# Patient Record
Sex: FEMALE | Race: WHITE | NOT HISPANIC OR LATINO | Employment: FULL TIME | URBAN - METROPOLITAN AREA
[De-identification: names, ages, dates, MRNs, and addresses within clinical notes are randomized per-mention and may not be internally consistent; named-entity substitution may affect disease eponyms.]

---

## 2017-01-09 ENCOUNTER — OFFICE VISIT (OUTPATIENT)
Dept: INTERNAL MEDICINE | Facility: CLINIC | Age: 42
End: 2017-01-09
Payer: OTHER GOVERNMENT

## 2017-01-09 VITALS
HEART RATE: 93 BPM | DIASTOLIC BLOOD PRESSURE: 80 MMHG | WEIGHT: 220 LBS | TEMPERATURE: 99 F | BODY MASS INDEX: 37.56 KG/M2 | HEIGHT: 64 IN | SYSTOLIC BLOOD PRESSURE: 124 MMHG | OXYGEN SATURATION: 97 %

## 2017-01-09 DIAGNOSIS — L98.9 SKIN LESION: ICD-10-CM

## 2017-01-09 DIAGNOSIS — J32.9 SINUSITIS, UNSPECIFIED CHRONICITY, UNSPECIFIED LOCATION: Primary | ICD-10-CM

## 2017-01-09 DIAGNOSIS — E89.0 POST-SURGICAL HYPOTHYROIDISM: ICD-10-CM

## 2017-01-09 PROCEDURE — 99999 PR PBB SHADOW E&M-EST. PATIENT-LVL III: CPT | Mod: PBBFAC,,, | Performed by: FAMILY MEDICINE

## 2017-01-09 PROCEDURE — 99213 OFFICE O/P EST LOW 20 MIN: CPT | Mod: PBBFAC | Performed by: FAMILY MEDICINE

## 2017-01-09 PROCEDURE — 99214 OFFICE O/P EST MOD 30 MIN: CPT | Mod: S$PBB,,, | Performed by: FAMILY MEDICINE

## 2017-01-09 RX ORDER — BENZONATATE 200 MG/1
200 CAPSULE ORAL 3 TIMES DAILY PRN
Qty: 30 CAPSULE | Refills: 1 | Status: SHIPPED | OUTPATIENT
Start: 2017-01-09 | End: 2018-02-07

## 2017-01-09 RX ORDER — FLUTICASONE PROPIONATE 50 MCG
2 SPRAY, SUSPENSION (ML) NASAL DAILY
Qty: 1 BOTTLE | Refills: 5 | Status: SHIPPED | OUTPATIENT
Start: 2017-01-09 | End: 2018-02-07

## 2017-01-09 RX ORDER — ALBUTEROL SULFATE 90 UG/1
2 AEROSOL, METERED RESPIRATORY (INHALATION) EVERY 6 HOURS PRN
Qty: 18 G | Refills: 5 | Status: SHIPPED | OUTPATIENT
Start: 2017-01-09 | End: 2018-12-31

## 2017-01-09 RX ORDER — DOXYCYCLINE HYCLATE 100 MG
100 TABLET ORAL 2 TIMES DAILY
Qty: 14 TABLET | Refills: 0 | Status: SHIPPED | OUTPATIENT
Start: 2017-01-09 | End: 2017-01-16

## 2017-01-09 NOTE — PROGRESS NOTES
Subjective:       Patient ID: Kellen Fraser is a 41 y.o. female.    Chief Complaint: URI    HPI Comments: Patient complains of upper respiratory congestion, rhinorrhea and facial pressure/pain that is not improving. No fever, chills or significant dyspnea or sputum production. OTC meds not effective. Some fatigue and malaise noted. 5 days. Tooth pain.    URI    Associated symptoms include congestion, coughing and wheezing. Pertinent negatives include no abdominal pain, chest pain, headaches, neck pain or rash.     Review of Systems   Constitutional: Negative for chills, fatigue and fever.   HENT: Positive for congestion, postnasal drip and sinus pressure. Negative for trouble swallowing.    Eyes: Negative for redness.   Respiratory: Positive for cough and wheezing. Negative for chest tightness and shortness of breath.    Cardiovascular: Negative for chest pain, palpitations and leg swelling.   Gastrointestinal: Negative for abdominal pain and blood in stool.   Genitourinary: Negative for hematuria and menstrual problem.   Musculoskeletal: Negative for arthralgias, back pain, gait problem, joint swelling, myalgias and neck pain.   Skin: Negative for color change and rash.   Neurological: Negative for tremors, speech difficulty, weakness, numbness and headaches.   Hematological: Negative for adenopathy. Does not bruise/bleed easily.   Psychiatric/Behavioral: Negative for behavioral problems, confusion and sleep disturbance. The patient is not nervous/anxious.        Objective:      Physical Exam   Constitutional: She is oriented to person, place, and time. She appears well-developed and well-nourished. No distress.   HENT:   Head: Normocephalic.   Right Ear: Tympanic membrane, external ear and ear canal normal.   Left Ear: Tympanic membrane, external ear and ear canal normal.   Nose: Nose normal.   Mouth/Throat: Oropharynx is clear and moist. No oropharyngeal exudate.   Eyes: Conjunctivae are normal. Right eye  exhibits no discharge. Left eye exhibits no discharge. No scleral icterus.   Neck: Normal range of motion. Neck supple. No thyromegaly present.   Cardiovascular: Normal rate, regular rhythm, normal heart sounds and intact distal pulses.  Exam reveals no gallop and no friction rub.    No murmur heard.  Pulmonary/Chest: Effort normal and breath sounds normal. No respiratory distress. She has no wheezes. She has no rales. She exhibits no tenderness.   Abdominal: Soft. There is no tenderness.   Musculoskeletal: She exhibits no edema.   Lymphadenopathy:     She has no cervical adenopathy.   Neurological: She is alert and oriented to person, place, and time.   Skin: Skin is warm and dry. No rash noted. She is not diaphoretic.   Nursing note and vitals reviewed.      Assessment:       1. Sinusitis, unspecified chronicity, unspecified location    2. Post-surgical hypothyroidism    3. Skin lesion        Plan:   Kellen was seen today for uri.    Diagnoses and all orders for this visit:    Sinusitis, unspecified chronicity, unspecified location    Post-surgical hypothyroidism    Skin lesion  -     Ambulatory referral to Dermatology    Other orders  -     doxycycline (VIBRA-TABS) 100 MG tablet; Take 1 tablet (100 mg total) by mouth 2 (two) times daily.  -     fluticasone (FLONASE) 50 mcg/actuation nasal spray; 2 sprays by Each Nare route once daily.  -     benzonatate (TESSALON) 200 MG capsule; Take 1 capsule (200 mg total) by mouth 3 (three) times daily as needed for Cough.  -     albuterol 90 mcg/actuation inhaler; Inhale 2 puffs into the lungs every 6 (six) hours as needed.      See meds, orders, follow up, routing and instructions sections of encounter.  A 41-year-old, upper respiratory complaints; facial pain five days, worsening;   mole to back, a few days, irritated; feverishness and chills, myalgias.    RECOMMENDATIONS:  She does have a 2-part small mole, which does not appear   infected to the mid back, mild facial  percussion tenderness.  See meds and   orders.  Follow up p.r.n.  Dermatology consult.      RICHARD/JUAN  dd: 01/09/2017 15:50:58 (CST)  td: 01/10/2017 05:55:13 (CST)  Doc ID   #[id number]  Job ID #[id number]    CC:

## 2017-01-09 NOTE — MR AVS SNAPSHOT
[facility]  [address]  [place] [address]  Phone: [phone]  Fax: [phone]                  Kellen Fraser   2017 2:40 PM   Office Visit    Description:  Female : 1975   Provider:  Juan Luis Alvarez MD   Department:  [facility]           Reason for Visit     URI           Diagnoses this Visit        Comments    Sinusitis, unspecified chronicity, unspecified location    -  Primary     Post-surgical hypothyroidism         Skin lesion                To Do List           Goals (5 Years of Data)     None      Follow-Up and Disposition     Return if symptoms worsen or fail to improve, for Keep appointments with specialty providers..    Follow-up and Disposition History       These Medications        Disp Refills Start End    doxycycline (VIBRA-TABS) 100 MG tablet 14 tablet 0 2017    Take 1 tablet (100 mg total) by mouth 2 (two) times daily. - Oral    Pharmacy: [id number] [place] - [id number] Ph #: [phone]       fluticasone (FLONASE) 50 mcg/actuation nasal spray 1 Bottle 5 2017     2 sprays by Each Nare route once daily. - Each Nare    Pharmacy: [id number] - [place] - [id number] Ph #: [phone]       benzonatate (TESSALON) 200 MG capsule 30 capsule 1 2017     Take 1 capsule (200 mg total) by mouth 3 (three) times daily as needed for Cough. - Oral    Ph[facility]: [id number] [place] [id number] Ph #: [phone]       albuterol 90 mcg/actuation inhaler 18 g 5 2017    Inhale 2 puffs into the lungs every 6 (six) hours as needed. - Inhalation    Ph[facility]acy: [id number] [place] - [id number] Ph #: [phone]         [facility]     [facility] On Call Nurse Care Line -  Assistance  Registered nurses in the [facility] On Call Center provide clinical advisement, health education, appointment booking, and other advisory services.  Call for this free service at  [phone].             Medications           START taking these NEW medications        Refills    doxycycline (VIBRA-TABS) 100 MG tablet 0    Sig: Take 1 tablet (100 mg total) by mouth 2 (two) times daily.    Class: Normal    Route: Oral    fluticasone (FLONASE) 50 mcg/actuation nasal spray 5    Si sprays by Each Nare route once daily.    Class: Normal    Route: Each Nare    benzonatate (TESSALON) 200 MG capsule 1    Sig: Take 1 capsule (200 mg total) by mouth 3 (three) times daily as needed for Cough.    Class: Normal    Route: Oral      CHANGE how you are taking these medications     Start Taking Instead of    albuterol 90 mcg/actuation inhaler ALBUTEROL SULFATE INHL    Dosage:  Inhale 2 puffs into the lungs every 6 (six) hours as needed. Dosage:  Inhale into the lungs as needed.    Reason for Change:  Reorder       STOP taking these medications     methylPREDNISolone (MEDROL DOSEPACK) 4 mg tablet Take 1 tablet (4 mg total) by mouth once daily. Use as instructed on dose pack           Verify that the below list of medications is an accurate representation of the medications you are currently taking.  If none reported, the list may be blank. If incorrect, please contact your healthcare provider. Carry this list with you in case of emergency.           Current Medications     albuterol 90 mcg/actuation inhaler Inhale 2 puffs into the lungs every 6 (six) hours as needed.    ibuprofen (ADVIL,MOTRIN) 800 MG tablet Take 800 mg by mouth 3 (three) times daily.    levothyroxine (SYNTHROID, LEVOTHROID) 175 MCG tablet Take 1 tablet (175 mcg total) by mouth once daily.    benzonatate (TESSALON) 200 MG capsule Take 1 capsule (200 mg total) by mouth 3 (three) times daily as needed for Cough.    doxycycline (VIBRA-TABS) 100 MG tablet Take 1 tablet (100 mg total) by mouth 2 (two) times daily.    fluticasone (FLONASE) 50 mcg/actuation nasal spray 2 sprays by Each Nare route once daily.           Clinical Reference  "Information           Vital Signs - Last Recorded  Most recent update: 1/9/2017  2:46 PM by Maria Isabel Moreno MA    BP Pulse Temp Ht Wt SpO2    124/80 (BP Location: Right arm, Patient Position: Sitting, BP Method: Manual) 93 99.2 °F (37.3 °C) (Oral) 5' 4" (1.626 m) 99.8 kg (220 lb 0.3 oz) 97%    BMI                37.77 kg/m2          Blood Pressure          Most Recent Value    BP  124/80      Allergies as of 1/9/2017     Bactrim [Sulfamethoxazole-trimethoprim]    Demerol [Meperidine]    Dilaudid [Hydromorphone (Bulk)]    Erythromycin    Penicillins      Immunizations Administered on Date of Encounter - 1/9/2017     None      Orders Placed During Today's Visit      Normal Orders This Visit    Ambulatory referral to Dermatology       "

## 2017-02-14 ENCOUNTER — INITIAL CONSULT (OUTPATIENT)
Dept: DERMATOLOGY | Facility: CLINIC | Age: 42
End: 2017-02-14
Payer: OTHER GOVERNMENT

## 2017-02-14 DIAGNOSIS — D48.5 NEOPLASM OF UNCERTAIN BEHAVIOR OF SKIN: Primary | ICD-10-CM

## 2017-02-14 DIAGNOSIS — L81.4 LENTIGINES: ICD-10-CM

## 2017-02-14 PROCEDURE — 99213 OFFICE O/P EST LOW 20 MIN: CPT | Mod: PBBFAC | Performed by: DERMATOLOGY

## 2017-02-14 PROCEDURE — 88305 TISSUE EXAM BY PATHOLOGIST: CPT | Performed by: PATHOLOGY

## 2017-02-14 PROCEDURE — 99202 OFFICE O/P NEW SF 15 MIN: CPT | Mod: 25,S$PBB,, | Performed by: DERMATOLOGY

## 2017-02-14 PROCEDURE — 99999 PR PBB SHADOW E&M-EST. PATIENT-LVL III: CPT | Mod: PBBFAC,,, | Performed by: DERMATOLOGY

## 2017-02-14 PROCEDURE — 88342 IMHCHEM/IMCYTCHM 1ST ANTB: CPT | Mod: 26,,, | Performed by: PATHOLOGY

## 2017-02-14 PROCEDURE — 11100 PR BIOPSY OF SKIN LESION: CPT | Mod: PBBFAC | Performed by: DERMATOLOGY

## 2017-02-14 PROCEDURE — 11100 PR BIOPSY OF SKIN LESION: CPT | Mod: S$PBB,,, | Performed by: DERMATOLOGY

## 2017-02-14 NOTE — PROGRESS NOTES
Subjective:       Patient ID:  Kellen Fraser is a 41 y.o. female who presents for   Chief Complaint   Patient presents with    Lesion     on back x 2-3 mo painful      HPI  42 yo F presents for evaluation of a mole on her back.  It recently became very inflamed and turned colors (black) and was very tender.  Since then, it is less painful but she is still concerned.  No prior treatments     Denies personal or family h/o skin cancer    Past Medical History   Diagnosis Date    BRCA negative 11/2014    Cervical disc disease      C5-C6, impacting thecal sac    Gallbladder sludge     Hypothyroidism     Thyroid cancer     Uterine fibroid      Review of Systems   Skin: Positive for activity-related sunscreen use. Negative for daily sunscreen use and recent sunburn.   Hematologic/Lymphatic: Does not bruise/bleed easily.        Objective:    Physical Exam   Constitutional: She appears well-developed and well-nourished. No distress.   Neurological: She is alert and oriented to person, place, and time. She is not disoriented.   Psychiatric: She has a normal mood and affect.   Skin:   Areas Examined (abnormalities noted in diagram):   Head / Face Inspection Performed  Neck Inspection Performed  Chest / Axilla Inspection Performed  Back Inspection Performed  RUE Inspected  LUE Inspection Performed                  Diagram Legend     Erythematous scaling macule/papule c/w actinic keratosis       Vascular papule c/w angioma      Pigmented verrucoid papule/plaque c/w seborrheic keratosis      Yellow umbilicated papule c/w sebaceous hyperplasia      Irregularly shaped tan macule c/w lentigo     1-2 mm smooth white papules consistent with Milia      Movable subcutaneous cyst with punctum c/w epidermal inclusion cyst      Subcutaneous movable cyst c/w pilar cyst      Firm pink to brown papule c/w dermatofibroma      Pedunculated fleshy papule(s) c/w skin tag(s)      Evenly pigmented macule c/w junctional nevus     Mildly  variegated pigmented, slightly irregular-bordered macule c/w mildly atypical nevus      Flesh colored to evenly pigmented papule c/w intradermal nevus       Pink pearly papule/plaque c/w basal cell carcinoma      Erythematous hyperkeratotic cursted plaque c/w SCC      Surgical scar with no sign of skin cancer recurrence      Open and closed comedones      Inflammatory papules and pustules      Verrucoid papule consistent consistent with wart     Erythematous eczematous patches and plaques     Dystrophic onycholytic nail with subungual debris c/w onychomycosis     Umbilicated papule    Erythematous-base heme-crusted tan verrucoid plaque consistent with inflamed seborrheic keratosis     Erythematous Silvery Scaling Plaque c/w Psoriasis     See annotation      Assessment / Plan:      Pathology Orders:      Normal Orders This Visit    Tissue Specimen To Pathology, Dermatology     Questions:    Directional Terms:  Other(comment)    Clinical information:  r/o inflmaed nevus vs NMSC    Specific Site:  L upper back        Neoplasm of uncertain behavior of skin  -     Tissue Specimen To Pathology, Dermatology  Shave biopsy procedure note:    Shave biopsy performed after verbal consent including risk of infection, scar, recurrence, need for additional treatment of site. Area prepped with alcohol, anesthetized with approximately 1.0cc of 1% lidocaine with epinephrine. Lesional tissue shaved with razor blade. Hemostasis achieved with application of aluminum chloride followed by hyfrecation. No complications. Dressing applied. Wound care explained.    Lentigines  This is a benign hyperpigmented sun induced lesion. Daily sun protection will reduce the number of new lesions.         Return for pending Pathology.

## 2017-02-24 ENCOUNTER — PATIENT MESSAGE (OUTPATIENT)
Dept: DERMATOLOGY | Facility: CLINIC | Age: 42
End: 2017-02-24

## 2017-10-23 ENCOUNTER — OFFICE VISIT (OUTPATIENT)
Dept: INTERNAL MEDICINE | Facility: CLINIC | Age: 42
End: 2017-10-23
Payer: OTHER GOVERNMENT

## 2017-10-23 VITALS
HEART RATE: 72 BPM | WEIGHT: 229.94 LBS | DIASTOLIC BLOOD PRESSURE: 100 MMHG | BODY MASS INDEX: 39.26 KG/M2 | OXYGEN SATURATION: 98 % | HEIGHT: 64 IN | TEMPERATURE: 98 F | SYSTOLIC BLOOD PRESSURE: 144 MMHG

## 2017-10-23 DIAGNOSIS — R07.9 CHEST PAIN, UNSPECIFIED TYPE: Primary | ICD-10-CM

## 2017-10-23 DIAGNOSIS — E89.0 POSTOPERATIVE HYPOTHYROIDISM: ICD-10-CM

## 2017-10-23 DIAGNOSIS — I10 HYPERTENSION, UNSPECIFIED TYPE: ICD-10-CM

## 2017-10-23 DIAGNOSIS — R51.9 ACUTE NONINTRACTABLE HEADACHE, UNSPECIFIED HEADACHE TYPE: ICD-10-CM

## 2017-10-23 PROCEDURE — 93010 ELECTROCARDIOGRAM REPORT: CPT | Mod: ,,, | Performed by: INTERNAL MEDICINE

## 2017-10-23 PROCEDURE — 99999 PR PBB SHADOW E&M-EST. PATIENT-LVL IV: CPT | Mod: PBBFAC,,, | Performed by: INTERNAL MEDICINE

## 2017-10-23 PROCEDURE — 99214 OFFICE O/P EST MOD 30 MIN: CPT | Mod: PBBFAC | Performed by: INTERNAL MEDICINE

## 2017-10-23 PROCEDURE — 99214 OFFICE O/P EST MOD 30 MIN: CPT | Mod: S$PBB,,, | Performed by: INTERNAL MEDICINE

## 2017-10-23 PROCEDURE — 93005 ELECTROCARDIOGRAM TRACING: CPT | Mod: PBBFAC,PO | Performed by: INTERNAL MEDICINE

## 2017-10-23 RX ORDER — BUTALBITAL, ACETAMINOPHEN AND CAFFEINE 50; 325; 40 MG/1; MG/1; MG/1
1 TABLET ORAL EVERY 4 HOURS PRN
Qty: 30 TABLET | Refills: 0 | Status: SHIPPED | OUTPATIENT
Start: 2017-10-23 | End: 2017-11-22

## 2017-10-23 RX ORDER — AMLODIPINE BESYLATE 5 MG/1
5 TABLET ORAL DAILY
Qty: 30 TABLET | Refills: 11 | Status: SHIPPED | OUTPATIENT
Start: 2017-10-23 | End: 2018-12-31

## 2017-10-23 NOTE — PROGRESS NOTES
Subjective:       Patient ID: Kellen Fraser is a 42 y.o. female.    Chief Complaint: Headache (headaches, elevated /89, dizziness, chest tightness)    Also complains of chest tightness that comes and goes for no apparent reason.  It is sometimes associated with SOB and nausea, but not consistently.  There is also some pain in left upper back that seems associated with chest pain      Headache    This is a new problem. The current episode started in the past 7 days. The problem occurs daily. The problem has been unchanged. The pain is located in the retro-orbital, bilateral and frontal region. The pain does not radiate. The pain quality is not similar to prior headaches. The quality of the pain is described as aching. Associated symptoms include dizziness. Pertinent negatives include no abdominal pain, abnormal behavior, anorexia, blurred vision, coughing, ear pain, fever, loss of balance, nausea, neck pain, phonophobia, photophobia, scalp tenderness, seizures, sinus pressure, sore throat, tingling, vomiting, weakness or weight loss. Nothing aggravates the symptoms. She has tried acetaminophen for the symptoms. The treatment provided moderate relief. (Hypothyroidism)     Review of Systems   Constitutional: Negative for activity change, chills, fatigue, fever and weight loss.   HENT: Negative for congestion, ear pain, nosebleeds, postnasal drip, sinus pressure and sore throat.    Eyes: Negative.  Negative for blurred vision, photophobia and visual disturbance.   Respiratory: Negative for cough, chest tightness, shortness of breath and wheezing.    Cardiovascular: Negative for chest pain.   Gastrointestinal: Negative for abdominal pain, anorexia, diarrhea, nausea and vomiting.   Genitourinary: Negative for difficulty urinating, dysuria, frequency and urgency.   Musculoskeletal: Negative for arthralgias, neck pain and neck stiffness.   Skin: Negative for rash.   Neurological: Positive for dizziness and headaches.  Negative for tingling, seizures, weakness and loss of balance.   Psychiatric/Behavioral: Negative for sleep disturbance. The patient is not nervous/anxious.        Objective:      Physical Exam   Constitutional: She is oriented to person, place, and time. She appears well-developed and well-nourished.  Non-toxic appearance. No distress.   HENT:   Head: Normocephalic and atraumatic.   Right Ear: Tympanic membrane, external ear and ear canal normal.   Left Ear: Tympanic membrane, external ear and ear canal normal.   Eyes: EOM are normal. Pupils are equal, round, and reactive to light. No scleral icterus.   Neck: Normal range of motion. Neck supple. No thyromegaly present.   Cardiovascular: Normal rate, regular rhythm and normal heart sounds.    Pulmonary/Chest: Effort normal and breath sounds normal.   Abdominal: Soft. Bowel sounds are normal. She exhibits no mass. There is no tenderness. There is no rebound.   Musculoskeletal: Normal range of motion.   Lymphadenopathy:     She has no cervical adenopathy.   Neurological: She is alert and oriented to person, place, and time. She has normal reflexes. She displays normal reflexes. No cranial nerve deficit. She exhibits normal muscle tone. Coordination normal.   Skin: Skin is warm and dry.   Psychiatric: Her speech is normal and behavior is normal. Thought content normal. Her affect is blunt. She exhibits a depressed mood.   Denies anxiety       Assessment:       1. Chest pain, unspecified type    2. Hypertension, unspecified type    3. Acute nonintractable headache, unspecified headache type    4. Postoperative hypothyroidism        Plan:   Kellen was seen today for headache.    Diagnoses and all orders for this visit:    Chest pain, unspecified type  -     IN OFFICE EKG 12-LEAD (to [place])    Hypertension, unspecified type  -     CBC auto differential; Future  -     Comprehensive metabolic panel; Future    Acute nonintractable headache, unspecified headache type  -      CBC auto differential; Future  -     Comprehensive metabolic panel; Future  -     CT Head Without Contrast; Future    Postoperative hypothyroidism  -     TSH; Future    Other orders  -     amlodipine (NORVASC) 5 MG tablet; Take 1 tablet (5 mg total) by mouth once daily.  -     butalbital-acetaminophen-caffeine -40 mg (FIORICET, ESGIC) -40 mg per tablet; Take 1 tablet by mouth every 4 (four) hours as needed for Pain.  -     Ambulatory consult to Neurology

## 2017-10-25 ENCOUNTER — HOSPITAL ENCOUNTER (OUTPATIENT)
Dept: RADIOLOGY | Facility: HOSPITAL | Age: 42
Discharge: HOME | End: 2017-10-25
Attending: INTERNAL MEDICINE
Payer: OTHER GOVERNMENT

## 2017-10-25 DIAGNOSIS — R51.9 ACUTE NONINTRACTABLE HEADACHE, UNSPECIFIED HEADACHE TYPE: ICD-10-CM

## 2017-10-25 PROCEDURE — 70450 CT HEAD/BRAIN W/O DYE: CPT | Mod: TC

## 2017-10-25 PROCEDURE — 70450 CT HEAD/BRAIN W/O DYE: CPT | Mod: 26,,, | Performed by: RADIOLOGY

## 2017-11-13 ENCOUNTER — HOSPITAL ENCOUNTER (OUTPATIENT)
Dept: RADIOLOGY | Facility: HOSPITAL | Age: 42
Discharge: HOME | End: 2017-11-13
Attending: NURSE PRACTITIONER
Payer: OTHER GOVERNMENT

## 2017-11-13 ENCOUNTER — OFFICE VISIT (OUTPATIENT)
Dept: INTERNAL MEDICINE | Facility: CLINIC | Age: 42
End: 2017-11-13
Payer: OTHER GOVERNMENT

## 2017-11-13 VITALS
TEMPERATURE: 99 F | RESPIRATION RATE: 20 BRPM | BODY MASS INDEX: 38.96 KG/M2 | OXYGEN SATURATION: 98 % | WEIGHT: 228.19 LBS | HEIGHT: 64 IN | SYSTOLIC BLOOD PRESSURE: 118 MMHG | HEART RATE: 64 BPM | DIASTOLIC BLOOD PRESSURE: 82 MMHG

## 2017-11-13 DIAGNOSIS — M54.31 SCIATICA OF RIGHT SIDE: Primary | ICD-10-CM

## 2017-11-13 DIAGNOSIS — M54.31 SCIATICA OF RIGHT SIDE: ICD-10-CM

## 2017-11-13 PROCEDURE — 99214 OFFICE O/P EST MOD 30 MIN: CPT | Mod: S$PBB,,, | Performed by: NURSE PRACTITIONER

## 2017-11-13 PROCEDURE — 99999 PR PBB SHADOW E&M-EST. PATIENT-LVL V: CPT | Mod: PBBFAC,,, | Performed by: NURSE PRACTITIONER

## 2017-11-13 PROCEDURE — 72110 X-RAY EXAM L-2 SPINE 4/>VWS: CPT | Mod: 26,,, | Performed by: RADIOLOGY

## 2017-11-13 PROCEDURE — 72110 X-RAY EXAM L-2 SPINE 4/>VWS: CPT | Mod: TC

## 2017-11-13 PROCEDURE — 99215 OFFICE O/P EST HI 40 MIN: CPT | Mod: PBBFAC,25 | Performed by: NURSE PRACTITIONER

## 2017-11-13 RX ORDER — GABAPENTIN 100 MG/1
100 CAPSULE ORAL 3 TIMES DAILY
Qty: 90 CAPSULE | Refills: 0 | Status: SHIPPED | OUTPATIENT
Start: 2017-11-13 | End: 2017-11-28 | Stop reason: DRUGHIGH

## 2017-11-13 RX ORDER — PREDNISONE 20 MG/1
TABLET ORAL
Qty: 11 TABLET | Refills: 0 | Status: SHIPPED | OUTPATIENT
Start: 2017-11-13 | End: 2017-11-23

## 2017-11-13 NOTE — PROGRESS NOTES
"Subjective:       Patient ID: Kellen Fraser is a 42 y.o. female.    Chief Complaint: Sciatica    Pt here c/o pain to right leg in thigh area and shooting pain down entire right leg.  Pt states that she has had problems with numbness and tingling to alessandro thighs for a long time.  Pt states a few weeks ago she rolled over in bed and felt a shooting/ stabbing pain down right leg to foot and states that her outer thigh was tender to touch.  Sx resolved and then she did it again Thursday.  Sx restarted and got worse over weekend.  No b/b issues.  States that she went to [facility] ER told she has sciatica and given muscle relaxer, anti inflammatory and told to f/u with PCP.        Past Medical History:   Diagnosis Date    BRCA negative 11/2014    Cervical disc disease     C5-C6, impacting thecal sac    Gallbladder sludge     Hypothyroidism     Thyroid cancer     Uterine fibroid      Past Surgical History:   Procedure Laterality Date    BREAST LUMPECTOMY Right     CHOLECYSTECTOMY  2012    FOOT SURGERY Right     THYROIDECTOMY  2004     Social History     Social History Narrative    No narrative on file     Family History   Problem Relation Age of Onset    No Known Problems Mother     Hypertension Father     Diabetes Father     Thyroid cancer Sister     Hodgkin's lymphoma Sister     Cancer Sister      Hodgkin's, thyroid    Leukemia Brother     Cancer Brother      Acute leukemia    Cancer Maternal Grandmother      Breast    Diabetes Maternal Grandmother     Breast cancer Maternal Grandmother 70    Heart disease Maternal Grandfather     Cancer Paternal Grandfather      Lymphoma    Ovarian cancer Neg Hx     Melanoma Neg Hx      Vitals:    11/13/17 0735   BP: 118/82   Pulse: 64   Resp: 20   Temp: 99.3 °F (37.4 °C)   SpO2: 98%   Weight: 103.5 kg (228 lb 2.8 oz)   Height: 5' 4" (1.626 m)   PainSc:   6     Outpatient Encounter Prescriptions as of 11/13/2017   Medication Sig Dispense Refill    " "albuterol 90 mcg/actuation inhaler Inhale 2 puffs into the lungs every 6 (six) hours as needed. 18 g 5    amlodipine (NORVASC) 5 MG tablet Take 1 tablet (5 mg total) by mouth once daily. 30 tablet 11    butalbital-acetaminophen-caffeine -40 mg (FIORICET, ESGIC) -40 mg per tablet Take 1 tablet by mouth every 4 (four) hours as needed for Pain. 30 tablet 0    levothyroxine (SYNTHROID, LEVOTHROID) 175 MCG tablet Take 1 tablet (175 mcg total) by mouth once daily. 90 tablet 3    methocarbamol (ROBAXIN) 500 MG Tab Take 2 tablets (1,000 mg total) by mouth 3 (three) times daily. 30 tablet 0    naproxen (NAPROSYN) 500 MG tablet Take 1 tablet (500 mg total) by mouth 2 (two) times daily with meals. 60 tablet 0    benzonatate (TESSALON) 200 MG capsule Take 1 capsule (200 mg total) by mouth 3 (three) times daily as needed for Cough. 30 capsule 1    fluticasone (FLONASE) 50 mcg/actuation nasal spray 2 sprays by Each Nare route once daily. 1 Bottle 5    gabapentin (NEURONTIN) 100 MG capsule Take 1 capsule (100 mg total) by mouth 3 (three) times daily. 90 capsule 0    ibuprofen (ADVIL,MOTRIN) 800 MG tablet Take 800 mg by mouth 3 (three) times daily.      predniSONE (DELTASONE) 20 MG tablet Take 2 po with breakfast x 3d, thenTake 1 po qd x 3d, thenTake 1/2 po qd x  4d 11 tablet 0     No facility-administered encounter medications on file as of 11/13/2017.      Wt Readings from Last 3 Encounters:   11/13/17 103.5 kg (228 lb 2.8 oz)   11/11/17 104.3 kg (230 lb)   10/23/17 104.3 kg (229 lb 15 oz)     Last 3 sets of Vitals    Vitals - 1 value per visit 10/23/2017 11/11/2017 11/13/2017   SYSTOLIC 144 129 118   DIASTOLIC 100 89 82   PULSE 72 91 64   TEMPERATURE 98.4 98.4 99.3   RESPIRATIONS - 17 20   SPO2 98 98 98   Weight (lb) 229.94 230 228.18   Weight (kg) 104.3 104.327 103.5   HEIGHT 5' 4" 5' 4" 5' 4"   BODY MASS INDEX 39.47 39.48 39.17   VISIT REPORT - - -   Pain Score  4 - 6     No results found for: CBC  Review " of Systems   Constitutional: Negative for activity change, fatigue and unexpected weight change.   HENT: Negative for hearing loss, rhinorrhea and trouble swallowing.    Eyes: Negative for discharge and visual disturbance.   Respiratory: Negative for chest tightness and wheezing.    Cardiovascular: Negative for chest pain and palpitations.   Gastrointestinal: Negative for blood in stool, constipation, diarrhea and vomiting.   Endocrine: Negative for polydipsia and polyuria.   Genitourinary: Negative for difficulty urinating, dysuria, hematuria and menstrual problem.   Musculoskeletal: Positive for arthralgias and myalgias. Negative for joint swelling, neck pain and neck stiffness.   Skin: Negative for rash.   Neurological: Positive for numbness. Negative for dizziness, facial asymmetry, weakness and headaches.   Psychiatric/Behavioral: Negative for confusion, dysphoric mood and sleep disturbance.       Objective:      Physical Exam   Constitutional: She is oriented to person, place, and time. She appears well-developed and well-nourished. No distress.   Walks with a limp favoring left side     HENT:   Head: Normocephalic and atraumatic.   Eyes: EOM are normal. Pupils are equal, round, and reactive to light. No scleral icterus.   Cardiovascular: Normal rate, regular rhythm, normal heart sounds and intact distal pulses.    No murmur heard.  Pulmonary/Chest: Effort normal and breath sounds normal. No respiratory distress.   Musculoskeletal:   Right sided motor deficits noted : weakness to right foot with eversion, inversion and flexion of toes.    No sensory deficits    No edema noted     Neurological: She is alert and oriented to person, place, and time. She displays normal reflexes. No sensory deficit.   Skin: Skin is dry. Capillary refill takes less than 2 seconds. No rash noted. She is not diaphoretic. No erythema. No pallor.   Psychiatric: She has a normal mood and affect. Her behavior is normal. Judgment and  thought content normal.   Nursing note and vitals reviewed.      Assessment:       1. Sciatica of right side        Plan:       Discussed Sciatica/ lumbar radiculopathy in detail with patient and treatment    Kellen was seen today for sciatica.    Diagnoses and all orders for this visit:    Sciatica of right side  -     X-Ray Lumbar Spine Complete 5 View; Future  -     Ambulatory Referral to Physical/Occupational Therapy  -     gabapentin (NEURONTIN) 100 MG capsule; Take 1 capsule (100 mg total) by mouth 3 (three) times daily.  -     predniSONE (DELTASONE) 20 MG tablet; Take 2 po with breakfast x 3d, thenTake 1 po qd x 3d, thenTake 1/2 po qd x  4d      Patient Instructions       Understanding Lumbar Radiculopathy    Lumbar radiculopathy is irritation or inflammation of a nerve root in the low back. It causes symptoms that spread out from the back down one or both legs. To understand this condition, it helps to understand the parts of the spine:  · Vertebrae. These are bones that stack to form the spine. The lumbar spine contains the 5 bottom vertebrae.  · Disks. These are soft pads of tissue between the vertebrae. They act as shock absorbers for the spine.  · Spinal canal. This is a tunnel formed within the stacked vertebrae. In the lumbar spine, nerves run through this canal.  · Nerves. These branch off and leave the spinal canal, traveling out to parts of the body. As they leave the spinal canal, nerves pass through openings between the vertebrae. The nerve root is the part of the nerve that is closest to the spinal canal.  · Sciatic nerve. This is a large nerve formed from several nerve roots in the low back. This nerve extends down the back of the leg to the foot.  With lumbar radiculopathy, nerve roots in the low back become irritated. This leads to pain and symptoms. The sciatic nerve is commonly involved, so the condition is often called sciatica.  What causes lumbar radiculopathy?  Aging, injury, poor  posture, extra body weight, and other issues can lead to problems in the low back. These problems may then irritate nerve roots. They include:  · Damage to a disk in the lumbar spine. The damaged disk may then press on nearby nerve roots.  · Degeneration from wear and tear, and aging. This can lead to narrowing (stenosis) of the openings between the vertebrae. The narrowed openings press on nerve roots as they leave the spinal canal.  · Unstable spine. This is when a vertebra slips forward. It can then press on a nerve root.  Other, less common things can put pressure on nerves in the low back. These include diabetes, infection, or a tumor.  Symptoms of lumbar radiculopathy  These include:  · Pain in the low back  · Pain, numbness, tingling, or weakness that travels into the buttocks, hip, groin, or leg  · Muscle spasms  Treatment for lumbar radiculopathy  In most cases, your healthcare provider will first try treatments that help relieve symptoms. These may include:  · Prescription and over-the-counter pain medicines. These help relieve pain, swelling, and irritation.  · Limits on positions and activities that increase pain. But lying in bed or avoiding all movement is only recommended for a short period of time.  · Physical therapy, including exercises and stretches. This helps decrease pain and increase movement and function.  · Steroid shots into the lower back. This may help relieve symptoms for a time.  · Weight-loss program. If you are overweight, losing extra pounds may help relieve symptoms.  In some cases, you may need surgery to fix the underlying problem. This depends on the cause, the symptoms, and how long the pain has lasted.  Possible complications  Over time, an irritated and inflamed nerve may become damaged. This may lead to long-lasting (permanent) numbness or weakness in your legs and feet. If symptoms change suddenly or get worse, be sure to let your healthcare provider know.  When to call your  healthcare provider  Call your healthcare provider right away if you have any of these:  · New pain or pain that gets worse  · New or increasing weakness, tingling, or numbness in your leg or foot  · Problems controlling your bladder or bowel   Date Last Reviewed: 3/10/2016  © 9225-9462 [employer]. [address] [place] [address]. All rights reserved. This information is not intended as a substitute for professional medical care. Always follow your healthcare professional's instructions.    For the Neurontin:  Start by taking it once a day today at bedtime, tomorrow take twice a day and on Wednesday take 1 capsule/ tablet three times a day.  Once you are taking 1 capsule three times a day you can increase to take 2 capsules three times a day and we can increase from there.  Neurontin has a wide dosing range     If no improvement with PT or you have any worsening let me know, the next step is getting a MRI and referring you to pain management for possible epidural steroid injections or other treatment    If you get any loss of control of your bowels or bladder go to the ER immediately      Take Prednisone with food, do not take any Naprosyn, Aleve, Ibuprofen, Advil or Motrin at the same time

## 2017-11-13 NOTE — PATIENT INSTRUCTIONS
Understanding Lumbar Radiculopathy    Lumbar radiculopathy is irritation or inflammation of a nerve root in the low back. It causes symptoms that spread out from the back down one or both legs. To understand this condition, it helps to understand the parts of the spine:  · Vertebrae. These are bones that stack to form the spine. The lumbar spine contains the 5 bottom vertebrae.  · Disks. These are soft pads of tissue between the vertebrae. They act as shock absorbers for the spine.  · Spinal canal. This is a tunnel formed within the stacked vertebrae. In the lumbar spine, nerves run through this canal.  · Nerves. These branch off and leave the spinal canal, traveling out to parts of the body. As they leave the spinal canal, nerves pass through openings between the vertebrae. The nerve root is the part of the nerve that is closest to the spinal canal.  · Sciatic nerve. This is a large nerve formed from several nerve roots in the low back. This nerve extends down the back of the leg to the foot.  With lumbar radiculopathy, nerve roots in the low back become irritated. This leads to pain and symptoms. The sciatic nerve is commonly involved, so the condition is often called sciatica.  What causes lumbar radiculopathy?  Aging, injury, poor posture, extra body weight, and other issues can lead to problems in the low back. These problems may then irritate nerve roots. They include:  · Damage to a disk in the lumbar spine. The damaged disk may then press on nearby nerve roots.  · Degeneration from wear and tear, and aging. This can lead to narrowing (stenosis) of the openings between the vertebrae. The narrowed openings press on nerve roots as they leave the spinal canal.  · Unstable spine. This is when a vertebra slips forward. It can then press on a nerve root.  Other, less common things can put pressure on nerves in the low back. These include diabetes, infection, or a tumor.  Symptoms of lumbar radiculopathy  These  include:  · Pain in the low back  · Pain, numbness, tingling, or weakness that travels into the buttocks, hip, groin, or leg  · Muscle spasms  Treatment for lumbar radiculopathy  In most cases, your healthcare provider will first try treatments that help relieve symptoms. These may include:  · Prescription and over-the-counter pain medicines. These help relieve pain, swelling, and irritation.  · Limits on positions and activities that increase pain. But lying in bed or avoiding all movement is only recommended for a short period of time.  · Physical therapy, including exercises and stretches. This helps decrease pain and increase movement and function.  · Steroid shots into the lower back. This may help relieve symptoms for a time.  · Weight-loss program. If you are overweight, losing extra pounds may help relieve symptoms.  In some cases, you may need surgery to fix the underlying problem. This depends on the cause, the symptoms, and how long the pain has lasted.  Possible complications  Over time, an irritated and inflamed nerve may become damaged. This may lead to long-lasting (permanent) numbness or weakness in your legs and feet. If symptoms change suddenly or get worse, be sure to let your healthcare provider know.  When to call your healthcare provider  Call your healthcare provider right away if you have any of these:  · New pain or pain that gets worse  · New or increasing weakness, tingling, or numbness in your leg or foot  · Problems controlling your bladder or bowel   Date Last Reviewed: 3/10/2016  © 0990-3119 [employer]. [address] [place] [address], [place] [address]. All rights reserved. This information is not intended as a substitute for professional medical care. Always follow your healthcare professional's instructions.    For the Neurontin:  Start by taking it once a day today at bedtime, tomorrow take twice a day and on Wednesday take 1 capsule/ tablet three times a day.  Once you are  taking 1 capsule three times a day you can increase to take 2 capsules three times a day and we can increase from there.  Neurontin has a wide dosing range     If no improvement with PT or you have any worsening let me know, the next step is getting a MRI and referring you to pain management for possible epidural steroid injections or other treatment    If you get any loss of control of your bowels or bladder go to the ER immediately      Take Prednisone with food, do not take any Naprosyn, Aleve, Ibuprofen, Advil or Motrin at the same time

## 2017-11-20 PROBLEM — M79.651 RIGHT THIGH PAIN: Status: ACTIVE | Noted: 2017-11-20

## 2017-11-27 ENCOUNTER — PATIENT MESSAGE (OUTPATIENT)
Dept: INTERNAL MEDICINE | Facility: CLINIC | Age: 42
End: 2017-11-27

## 2017-11-27 DIAGNOSIS — M54.50 LOW BACK PAIN POTENTIALLY ASSOCIATED WITH RADICULOPATHY: Primary | ICD-10-CM

## 2017-11-27 RX ORDER — LEVOTHYROXINE SODIUM 175 UG/1
175 TABLET ORAL DAILY
Qty: 90 TABLET | Refills: 3 | Status: SHIPPED | OUTPATIENT
Start: 2017-11-27 | End: 2019-02-02 | Stop reason: SDUPTHER

## 2017-11-28 DIAGNOSIS — M54.31 SCIATICA OF RIGHT SIDE: Primary | ICD-10-CM

## 2017-11-28 DIAGNOSIS — M54.50 LOW BACK PAIN POTENTIALLY ASSOCIATED WITH RADICULOPATHY: ICD-10-CM

## 2017-11-28 RX ORDER — GABAPENTIN 300 MG/1
300 CAPSULE ORAL 3 TIMES DAILY
Qty: 90 CAPSULE | Refills: 11 | Status: SHIPPED | OUTPATIENT
Start: 2017-11-28 | End: 2018-06-14

## 2017-12-04 ENCOUNTER — TELEPHONE (OUTPATIENT)
Dept: INTERNAL MEDICINE | Facility: CLINIC | Age: 42
End: 2017-12-04

## 2017-12-04 DIAGNOSIS — M54.40 ACUTE LOW BACK PAIN WITH SCIATICA, SCIATICA LATERALITY UNSPECIFIED, UNSPECIFIED BACK PAIN LATERALITY: Primary | ICD-10-CM

## 2017-12-04 NOTE — TELEPHONE ENCOUNTER
----- Message from Kayla Ley sent at 12/4/2017  9:03 AM CST -----  Contact: pt   Pt is requesting orders for blood work for MRI with contrast.     Pt can be reached at [phone].

## 2017-12-04 NOTE — TELEPHONE ENCOUNTER
Spoke with patient, made her aware that I spoke with MRI and the labs need to be done within 2 weeks of MRI, she was driving and will call to schedule her lab appt.

## 2017-12-07 ENCOUNTER — LAB VISIT (OUTPATIENT)
Dept: LAB | Facility: HOSPITAL | Age: 42
End: 2017-12-07
Payer: OTHER GOVERNMENT

## 2017-12-07 DIAGNOSIS — M54.40 ACUTE LOW BACK PAIN WITH SCIATICA, SCIATICA LATERALITY UNSPECIFIED, UNSPECIFIED BACK PAIN LATERALITY: ICD-10-CM

## 2017-12-07 LAB
ANION GAP SERPL CALC-SCNC: 7 MMOL/L
BUN SERPL-MCNC: 12 MG/DL
CALCIUM SERPL-MCNC: 8.9 MG/DL
CHLORIDE SERPL-SCNC: 107 MMOL/L
CO2 SERPL-SCNC: 26 MMOL/L
CREAT SERPL-MCNC: 0.8 MG/DL
EST. GFR  (AFRICAN AMERICAN): >60 ML/MIN/1.73 M^2
EST. GFR  (NON AFRICAN AMERICAN): >60 ML/MIN/1.73 M^2
GLUCOSE SERPL-MCNC: 131 MG/DL
POTASSIUM SERPL-SCNC: 4.1 MMOL/L
SODIUM SERPL-SCNC: 140 MMOL/L

## 2017-12-07 PROCEDURE — 80048 BASIC METABOLIC PNL TOTAL CA: CPT

## 2017-12-07 PROCEDURE — 36415 COLL VENOUS BLD VENIPUNCTURE: CPT

## 2017-12-19 ENCOUNTER — HOSPITAL ENCOUNTER (OUTPATIENT)
Dept: RADIOLOGY | Facility: HOSPITAL | Age: 42
Discharge: HOME | End: 2017-12-19
Attending: NURSE PRACTITIONER
Payer: OTHER GOVERNMENT

## 2017-12-19 DIAGNOSIS — M54.31 SCIATICA OF RIGHT SIDE: ICD-10-CM

## 2017-12-19 DIAGNOSIS — M54.50 LOW BACK PAIN POTENTIALLY ASSOCIATED WITH RADICULOPATHY: ICD-10-CM

## 2017-12-19 PROCEDURE — 25500020 PHARM REV CODE 255: Performed by: NURSE PRACTITIONER

## 2017-12-19 PROCEDURE — A9585 GADOBUTROL INJECTION: HCPCS | Performed by: NURSE PRACTITIONER

## 2017-12-19 PROCEDURE — 72158 MRI LUMBAR SPINE W/O & W/DYE: CPT | Mod: 26,,, | Performed by: RADIOLOGY

## 2017-12-19 PROCEDURE — 72158 MRI LUMBAR SPINE W/O & W/DYE: CPT | Mod: TC

## 2017-12-19 RX ORDER — GADOBUTROL 604.72 MG/ML
10 INJECTION INTRAVENOUS
Status: COMPLETED | OUTPATIENT
Start: 2017-12-19 | End: 2017-12-19

## 2017-12-19 RX ADMIN — GADOBUTROL 10 ML: 604.72 INJECTION INTRAVENOUS at 07:12

## 2017-12-20 ENCOUNTER — TELEPHONE (OUTPATIENT)
Dept: INTERNAL MEDICINE | Facility: CLINIC | Age: 42
End: 2017-12-20

## 2017-12-20 DIAGNOSIS — M54.50 LOW BACK PAIN POTENTIALLY ASSOCIATED WITH RADICULOPATHY: Primary | ICD-10-CM

## 2017-12-20 DIAGNOSIS — N28.1 RENAL CYST: ICD-10-CM

## 2018-01-04 ENCOUNTER — PATIENT MESSAGE (OUTPATIENT)
Dept: INTERNAL MEDICINE | Facility: CLINIC | Age: 43
End: 2018-01-04

## 2018-01-04 DIAGNOSIS — M54.50 LOW BACK PAIN POTENTIALLY ASSOCIATED WITH RADICULOPATHY: Primary | ICD-10-CM

## 2018-01-05 RX ORDER — MELOXICAM 15 MG/1
15 TABLET ORAL DAILY
Qty: 30 TABLET | Refills: 3 | Status: SHIPPED | OUTPATIENT
Start: 2018-01-05 | End: 2019-07-23 | Stop reason: SDUPTHER

## 2018-01-10 ENCOUNTER — PATIENT MESSAGE (OUTPATIENT)
Dept: INTERNAL MEDICINE | Facility: CLINIC | Age: 43
End: 2018-01-10

## 2018-02-02 ENCOUNTER — TELEPHONE (OUTPATIENT)
Dept: PAIN MEDICINE | Facility: CLINIC | Age: 43
End: 2018-02-02

## 2018-02-05 ENCOUNTER — OFFICE VISIT (OUTPATIENT)
Dept: PAIN MEDICINE | Facility: CLINIC | Age: 43
End: 2018-02-05
Attending: ANESTHESIOLOGY
Payer: OTHER GOVERNMENT

## 2018-02-05 VITALS — HEIGHT: 64 IN | WEIGHT: 233.44 LBS | BODY MASS INDEX: 39.85 KG/M2

## 2018-02-05 DIAGNOSIS — M79.651 RIGHT THIGH PAIN: ICD-10-CM

## 2018-02-05 DIAGNOSIS — G57.11 MERALGIA PARESTHETICA, RIGHT: Primary | ICD-10-CM

## 2018-02-05 PROCEDURE — 3008F BODY MASS INDEX DOCD: CPT | Mod: S$GLB,,, | Performed by: ANESTHESIOLOGY

## 2018-02-05 PROCEDURE — 99204 OFFICE O/P NEW MOD 45 MIN: CPT | Mod: S$GLB,,, | Performed by: ANESTHESIOLOGY

## 2018-02-05 RX ORDER — DULOXETIN HYDROCHLORIDE 30 MG/1
30 CAPSULE, DELAYED RELEASE ORAL 2 TIMES DAILY
Qty: 60 CAPSULE | Refills: 11 | Status: SHIPPED | OUTPATIENT
Start: 2018-02-05 | End: 2018-12-31

## 2018-02-05 NOTE — LETTER
February 5, 2018      Alireza Smith MD  [address]  [facility] [address]           [facility]  [address]  [facility] [address]  Phone: [phone]  Fax: [phone]          Patient: Kellen Fraser   MR Number: [id number]   YOB: 1975   Date of Visit: 2/5/2018       Dear Dr. Alireza Smith:    Thank you for referring Kellen Fraser to me for evaluation. Attached you will find relevant portions of my assessment and plan of care.    If you have questions, please do not hesitate to call me. I look forward to following Kellen Fraser along with you.    Sincerely,    Sushil Bishop III, MD    Enclosure  CC:  No Recipients    If you would like to receive this communication electronically, please contact [email or web address] or [phone] to request more information on [facility] Link access.    For providers and/or their staff who would like to refer a patient to [facility], please contact us through our one-stop-shop provider referral line, [facility], at [phone].    If you feel you have received this communication in error or would no longer like to receive these types of communications, please e-mail [email or web address]

## 2018-02-05 NOTE — PROGRESS NOTES
Chronic Pain - New Consult    Referring Physician: Alireza Smith MD    Chief Complaint: Right thigh pain        SUBJECTIVE:    Kellen Fraser presents to the clinic for the evaluation of right thigh pain. The pain started on Nov. 9, 2017 after turning in bed at night and symptoms have been worsening. No recent trauma. The pain is located in the right lateral thigh area. She denies pain in the low back.  The leg pain is described as aching, burning, sharp, stabbing and numbness and is rated as 6/10. The pain is rated with a score of  2/10 on the BEST day and a score of 9/10 on the WORST day.  Symptoms interfere with daily activity, sleeping and work. The pain is exacerbated by sitting, laying down, light touch and night time. She has been participating in PT and taking Gabapentin which brings mild relief.  The patient reports 4-5 hours of uninterrupted sleep per night.     Patient reports loss of sensation in right thigh. Patient denies significant motor weakness and bowel/bladder incontinence.    Physical Therapy/Home Exercise: yes      Pain Disability Index Review:  Last 3 PDI Scores 2/5/2018   Pain Disability Index (PDI) 44       Pain Medications:    - Mobic 15 mg daily  - Gabapentin 300 mg TID     report:  Not applicable    Imaging:     Lumbar MRI (12/2017):    FINDINGS:  Lumbar vertebral body alignment is within normal limits.  Vertebral body heights are maintained without evidence of suggest acute fracture.  There is no marrow signal abnormality suspicious for infiltrative process.  There are small L1 and L2 vertebral body hemangiomas. Intervertebral disc heights appear relatively well-maintained.    The conus is normal in appearance and terminates at the inferior aspect of the L1 vertebral body.  There is an incompletely visualized 3.6 cm T2 hyperintense, T1 hypointense, lesion within the inferior pole of the left kidney with bowel both thin septation.  This possibly represents a septated renal cyst.   However, further evaluation with nonemergent renal ultrasound is recommended as this is incompletely included in the field-of-view on today's dedicated lumbar spine MRI.  After the administration of intravenous contrast, there are no areas of abnormal enhancement seen within the osseous structures, soft tissues, spinal cord or nerve roots.    L1-L2: No significant spinal canal or neural foraminal narrowing.      L2-L3: No significant spinal canal or neuroforaminal narrowing.      L3-L4: No significant spinal canal or neural foraminal narrowing.      L4-L5: There is mild bilateral facet arthropathy.  There is a mild posterior disc bulge.  There is no significant spinal canal or neural foraminal narrowing.     L5-S1: There is mild bilateral facet arthropathy.  There is no significant spinal canal or neural foraminal narrowing.    Past Medical History:   Diagnosis Date    BRCA negative 11/2014    Cervical disc disease     C5-C6, impacting thecal sac    Gallbladder sludge     Hypothyroidism     Thyroid cancer     Uterine fibroid      Past Surgical History:   Procedure Laterality Date    BREAST LUMPECTOMY Right     CHOLECYSTECTOMY  2012    FOOT SURGERY Right     THYROIDECTOMY  2004     Social History     Social History    Marital status:      Spouse name: N/A    Number of children: N/A    Years of education: N/A     Occupational History    Not on file.     Social History Main Topics    Smoking status: Never Smoker    Smokeless tobacco: Never Used    Alcohol use Yes    Drug use: Unknown    Sexual activity: Yes     Other Topics Concern    Not on file     Social History Narrative    No narrative on file     Family History   Problem Relation Age of Onset    No Known Problems Mother     Hypertension Father     Diabetes Father     Thyroid cancer Sister     Hodgkin's lymphoma Sister     Cancer Sister      Hodgkin's, thyroid    Leukemia Brother     Cancer Brother      Acute leukemia     Cancer Maternal Grandmother      Breast    Diabetes Maternal Grandmother     Breast cancer Maternal Grandmother 70    Heart disease Maternal Grandfather     Cancer Paternal Grandfather      Lymphoma    Ovarian cancer Neg Hx     Melanoma Neg Hx        Review of patient's allergies indicates:   Allergen Reactions    Bactrim [sulfamethoxazole-trimethoprim] Nausea And Vomiting    Demerol [meperidine] Hives    Dilaudid [hydromorphone (bulk)] Hives    Erythromycin Hives    Influenza virus vaccines Hives    Penicillins Hives       Current Outpatient Prescriptions   Medication Sig    amlodipine (NORVASC) 5 MG tablet Take 1 tablet (5 mg total) by mouth once daily.    gabapentin (NEURONTIN) 300 MG capsule Take 1 capsule (300 mg total) by mouth 3 (three) times daily.    levothyroxine (SYNTHROID, LEVOTHROID) 175 MCG tablet Take 1 tablet (175 mcg total) by mouth once daily.    meloxicam (MOBIC) 15 MG tablet Take 1 tablet (15 mg total) by mouth once daily.    albuterol 90 mcg/actuation inhaler Inhale 2 puffs into the lungs every 6 (six) hours as needed.    benzonatate (TESSALON) 200 MG capsule Take 1 capsule (200 mg total) by mouth 3 (three) times daily as needed for Cough.    DULoxetine (CYMBALTA) 30 MG capsule Take 1 capsule (30 mg total) by mouth 2 (two) times daily. Take 1 cap daily x 5 days. After 5 days, increase to 1 cap BID.    fluticasone (FLONASE) 50 mcg/actuation nasal spray 2 sprays by Each Nare route once daily.     No current facility-administered medications for this visit.        REVIEW OF SYSTEMS:    GENERAL:  No weight loss, malaise or fevers.  HEENT:  Negative for frequent or significant headaches.  NECK:  Negative for lumps, goiter, pain and significant neck swelling.  RESPIRATORY:  Negative for cough, wheezing or shortness of breath.  CARDIOVASCULAR:  Negative for chest pain, leg swelling or palpitations.  GI:  Negative for abdominal discomfort, blood in stools or black stools or change in  "bowel habits.  MUSCULOSKELETAL:  See HPI.  SKIN:  Negative for lesions, rash, and itching.  PSYCH:  + sleep disturbance  HEMATOLOGY/LYMPHOLOGY:  Negative for prolonged bleeding, bruising easily or swollen nodes.  NEURO:   No history of headaches, syncope, paralysis, seizures or tremors.  All other reviewed and negative other than HPI.    OBJECTIVE:    Ht 5' 4" (1.626 m)   Wt 105.9 kg (233 lb 7.5 oz)   BMI 40.07 kg/m²     PHYSICAL EXAMINATION:    General appearance: Well appearing, in no acute distress, alert and oriented x3. Overweight.  Psych:  Mood and affect appropriate.  Skin: Skin color, texture, turgor normal, no rashes or lesions, in both upper and lower body.  Head/face:  Normocephalic, atraumatic.   Neck: No pain to palpation over the cervical paraspinous muscles. No pain with neck flexion, extension, or lateral flexion.   Cor: RRR  Pulm: Breathing unlabored.  GI:  Soft and non-tender.  Back: Straight leg raising in the sitting and supine positions is negative to radicular pain. No pain to palpation over the spine or costovertebral angles. Normal range of motion without pain reproduction.  Extremities: Peripheral joint ROM is full and pain free without obvious instability or laxity in all four extremities. No deformities, edema, or skin discoloration.   Musculoskeletal: Shoulder, hip, sacroiliac and knee provocative maneuvers are negative.  No atrophy or tone abnormalities are noted.  + tenderness to palpation over right GT bursa  Neuro: Bilateral upper and lower extremity strength is normal and symmetric. Bilateral lower extremity coordination and muscle stretch reflexes are physiologic and symmetric. Sensation diminished to light touch along lateral aspect of right thigh. Allodynia present to light touch.  Gait: Normal.    ASSESSMENT: 42 y.o.  female with right lateral thigh pain, consistent with      1. Meralgia paresthetica, right          PLAN:     - I have stressed the importance of physical " activity and a home exercise plan to help with pain and improve health.  - OK to continue PT.  - Continue Neurontin.  - Start Cymbalta 30mg gradually to twice a day to help with neuropathic pain.   - Rx topical compounded pain cream with Doxepin to apply to right thigh.  - In the future, I will consider right lateral femoral cutaneous nerve block.  - RTC in 4 weeks.    The above plan and management options were discussed at length with patient. Patient is in agreement with the above and verbalized understanding. It will be communicated with the referring physician via electronic record, fax, or mail.    Sushil Bishop III  02/05/2018

## 2018-02-07 ENCOUNTER — OFFICE VISIT (OUTPATIENT)
Dept: INTERNAL MEDICINE | Facility: CLINIC | Age: 43
End: 2018-02-07
Payer: OTHER GOVERNMENT

## 2018-02-07 VITALS
BODY MASS INDEX: 39.52 KG/M2 | HEIGHT: 64 IN | HEART RATE: 68 BPM | SYSTOLIC BLOOD PRESSURE: 102 MMHG | WEIGHT: 231.5 LBS | DIASTOLIC BLOOD PRESSURE: 70 MMHG

## 2018-02-07 DIAGNOSIS — Z00.00 ANNUAL PHYSICAL EXAM: Primary | ICD-10-CM

## 2018-02-07 DIAGNOSIS — Z12.31 ENCOUNTER FOR SCREENING MAMMOGRAM FOR BREAST CANCER: ICD-10-CM

## 2018-02-07 DIAGNOSIS — E89.0 POST-SURGICAL HYPOTHYROIDISM: ICD-10-CM

## 2018-02-07 DIAGNOSIS — Z12.4 CERVICAL CANCER SCREENING: ICD-10-CM

## 2018-02-07 DIAGNOSIS — M25.552 BILATERAL HIP PAIN: ICD-10-CM

## 2018-02-07 DIAGNOSIS — M25.551 BILATERAL HIP PAIN: ICD-10-CM

## 2018-02-07 DIAGNOSIS — Z23 NEED FOR DIPHTHERIA-TETANUS-PERTUSSIS (TDAP) VACCINE: ICD-10-CM

## 2018-02-07 DIAGNOSIS — E66.01 SEVERE OBESITY (BMI 35.0-39.9) WITH COMORBIDITY: ICD-10-CM

## 2018-02-07 DIAGNOSIS — N63.0 BREAST MASS: ICD-10-CM

## 2018-02-07 PROCEDURE — 99214 OFFICE O/P EST MOD 30 MIN: CPT | Mod: PBBFAC,25 | Performed by: INTERNAL MEDICINE

## 2018-02-07 PROCEDURE — 90471 IMMUNIZATION ADMIN: CPT | Mod: PBBFAC

## 2018-02-07 PROCEDURE — 99999 PR PBB SHADOW E&M-EST. PATIENT-LVL IV: CPT | Mod: PBBFAC,,, | Performed by: INTERNAL MEDICINE

## 2018-02-07 PROCEDURE — 99396 PREV VISIT EST AGE 40-64: CPT | Mod: S$PBB,,, | Performed by: INTERNAL MEDICINE

## 2018-02-07 NOTE — PROGRESS NOTES
"Subjective:       Patient ID: Kellen Fraser is a 42 y.o. female.    Chief Complaint: Annual Exam    HPI    Last visit with me 05/2016. Since then seen by Podiatry, Breast Surg, Dermatology, Internal Medicine, and Pain Med.    Started on Cymbalta and compound cream, plan to start and try for 1 week.    Reports having problems with hips with Physical Therapy. Ongoing hip soreness with walking.     Reports weight gain, has not been eating healthy. In 2014 had fall down stairs. Weight has been stable. Going to try herbal supplements.     Reviewed PMH, PSH, SH, FH, allergies, and medications.     Review of Systems   All other systems reviewed and are negative.      Objective:      Physical Exam   Constitutional: She is oriented to person, place, and time. No distress.   HENT:   Head: Atraumatic.   Right Ear: Tympanic membrane normal.   Left Ear: Tympanic membrane normal.   Mouth/Throat: Oropharynx is clear and moist. No oropharyngeal exudate.   Eyes: Pupils are equal, round, and reactive to light. Right eye exhibits no discharge. Left eye exhibits no discharge.   Neck: Normal range of motion. No thyromegaly present.   Cardiovascular: Normal rate, regular rhythm and normal heart sounds.    Pulmonary/Chest: Effort normal and breath sounds normal. No stridor. She has no wheezes. She has no rales.   Musculoskeletal: She exhibits no edema or tenderness.   Mild limp when getting on and off exam table but not severe   Lymphadenopathy:     She has no cervical adenopathy.   Neurological: She is alert and oriented to person, place, and time.   Skin: Skin is warm and dry. No rash noted.   Psychiatric: She has a normal mood and affect. Her behavior is normal.   Nursing note and vitals reviewed.      Vitals:    02/07/18 1427   BP: 102/70   BP Location: Right arm   Patient Position: Sitting   BP Method: Large (Manual)   Pulse: 68   Weight: 105 kg (231 lb 7.7 oz)   Height: 5' 4" (1.626 m)     Body mass index is 39.73 " kg/m².    RESULTS: Reviewed labs from last 12 months    Assessment:       1. Annual physical exam    2. Post-surgical hypothyroidism    3. Severe obesity (BMI 35.0-39.9) with comorbidity    4. Bilateral hip pain    5. Encounter for screening mammogram for breast cancer    6. Breast mass    7. Cervical cancer screening    8. Need for diphtheria-tetanus-pertussis (Tdap) vaccine        Plan:   Kellen was seen today for annual exam.    Diagnoses and all orders for this visit:    Annual physical exam:  Age-appropriate health screening reviewed, indicated tests ordered.   -     TSH; Future  -     Comprehensive metabolic panel; Future  -     CBC Without Differential; Future    Post-surgical hypothyroidism:  Prior diagnosis, well controlled on current management. No changes at this time, will continue to monitor.   -     TSH; Future    Severe obesity (BMI 35.0-39.9) with comorbidity:  Pt working on weight loss, has been able to successfully lose weight in past.    Bilateral hip pain:  Xray and refer to Orthopedics.  -     X-Ray Hips Bilateral 2 View Incl AP Pelvis; Future  -     Ambulatory referral to Orthopedics    Encounter for screening mammogram for breast cancer  -     Mammo Digital Screening Bilat with CAD; Future    Breast mass:  Prior dx, repeat ultrasound.    Cervical cancer screening  -     Ambulatory Referral to Gynecology    Need for diphtheria-tetanus-pertussis (Tdap) vaccine  -     Tdap Vaccine      Follow-up in about 6 months (around 8/7/2018) for fasting labs 1 week prior. assess if successful with weight loss, see if can stop Norvasc.  Alireza Smith MD  Internal Medicine    Portions of this note were completed using [employer] dictation software. Please excuse typographical or syntax errors.

## 2018-02-09 ENCOUNTER — HOSPITAL ENCOUNTER (OUTPATIENT)
Dept: RADIOLOGY | Facility: HOSPITAL | Age: 43
Discharge: HOME | End: 2018-02-09
Attending: INTERNAL MEDICINE
Payer: OTHER GOVERNMENT

## 2018-02-09 DIAGNOSIS — M25.551 BILATERAL HIP PAIN: ICD-10-CM

## 2018-02-09 DIAGNOSIS — M25.552 BILATERAL HIP PAIN: ICD-10-CM

## 2018-02-09 PROCEDURE — 73521 X-RAY EXAM HIPS BI 2 VIEWS: CPT | Mod: 26,,, | Performed by: RADIOLOGY

## 2018-02-09 PROCEDURE — 73521 X-RAY EXAM HIPS BI 2 VIEWS: CPT | Mod: TC

## 2018-02-19 ENCOUNTER — HOSPITAL ENCOUNTER (OUTPATIENT)
Dept: RADIOLOGY | Facility: HOSPITAL | Age: 43
Discharge: HOME | End: 2018-02-19
Attending: INTERNAL MEDICINE
Payer: OTHER GOVERNMENT

## 2018-02-19 VITALS — BODY MASS INDEX: 39.44 KG/M2 | HEIGHT: 64 IN | WEIGHT: 231 LBS

## 2018-02-19 DIAGNOSIS — Z12.31 ENCOUNTER FOR SCREENING MAMMOGRAM FOR BREAST CANCER: ICD-10-CM

## 2018-02-19 PROCEDURE — 77067 SCR MAMMO BI INCL CAD: CPT | Mod: 26,,, | Performed by: RADIOLOGY

## 2018-02-19 PROCEDURE — 77067 SCR MAMMO BI INCL CAD: CPT | Mod: TC

## 2018-02-20 ENCOUNTER — TELEPHONE (OUTPATIENT)
Dept: RADIOLOGY | Facility: HOSPITAL | Age: 43
End: 2018-02-20

## 2018-02-20 NOTE — TELEPHONE ENCOUNTER
Spoke with patient and explained mammogram findings.Patient expressed understanding of results. Patient is scheduled for a abnormal mammogram follow up appointment at [facility] on 2/22/18.

## 2018-02-22 ENCOUNTER — HOSPITAL ENCOUNTER (OUTPATIENT)
Dept: RADIOLOGY | Facility: HOSPITAL | Age: 43
Discharge: HOME | End: 2018-02-22
Attending: INTERNAL MEDICINE
Payer: OTHER GOVERNMENT

## 2018-02-22 DIAGNOSIS — R92.8 ABNORMAL MAMMOGRAM: ICD-10-CM

## 2018-02-22 PROCEDURE — 77065 DX MAMMO INCL CAD UNI: CPT | Mod: TC,PO

## 2018-02-22 PROCEDURE — 77065 DX MAMMO INCL CAD UNI: CPT | Mod: 26,,, | Performed by: RADIOLOGY

## 2018-02-22 PROCEDURE — 77061 BREAST TOMOSYNTHESIS UNI: CPT | Mod: TC,PO

## 2018-02-22 PROCEDURE — 77061 BREAST TOMOSYNTHESIS UNI: CPT | Mod: 26,,, | Performed by: RADIOLOGY

## 2018-02-23 ENCOUNTER — PATIENT MESSAGE (OUTPATIENT)
Dept: INTERNAL MEDICINE | Facility: CLINIC | Age: 43
End: 2018-02-23

## 2018-02-23 NOTE — TELEPHONE ENCOUNTER
Repeat mammogram reviewed, abnormal finding on prior mammogram determined benign.  Repeat mammogram screening in 1 year.

## 2018-02-26 ENCOUNTER — HOSPITAL ENCOUNTER (OUTPATIENT)
Dept: RADIOLOGY | Facility: HOSPITAL | Age: 43
Discharge: HOME | End: 2018-02-26
Attending: NURSE PRACTITIONER
Payer: OTHER GOVERNMENT

## 2018-02-26 DIAGNOSIS — N28.1 RENAL CYST: ICD-10-CM

## 2018-02-26 PROCEDURE — 76770 US EXAM ABDO BACK WALL COMP: CPT | Mod: TC

## 2018-02-26 PROCEDURE — 76770 US EXAM ABDO BACK WALL COMP: CPT | Mod: 26,,, | Performed by: RADIOLOGY

## 2018-03-26 ENCOUNTER — OFFICE VISIT (OUTPATIENT)
Dept: ORTHOPEDICS | Facility: CLINIC | Age: 43
End: 2018-03-26
Payer: OTHER GOVERNMENT

## 2018-03-26 VITALS — BODY MASS INDEX: 37.39 KG/M2 | HEIGHT: 64 IN | WEIGHT: 219 LBS

## 2018-03-26 DIAGNOSIS — M25.859 HIP IMPINGEMENT SYNDROME, UNSPECIFIED LATERALITY: Primary | ICD-10-CM

## 2018-03-26 PROCEDURE — 99203 OFFICE O/P NEW LOW 30 MIN: CPT | Mod: S$PBB,,, | Performed by: NURSE PRACTITIONER

## 2018-03-26 PROCEDURE — 99213 OFFICE O/P EST LOW 20 MIN: CPT | Mod: PBBFAC | Performed by: NURSE PRACTITIONER

## 2018-03-26 PROCEDURE — 99999 PR PBB SHADOW E&M-EST. PATIENT-LVL III: CPT | Mod: PBBFAC,,, | Performed by: NURSE PRACTITIONER

## 2018-03-26 NOTE — PROGRESS NOTES
"CC: Pain of the Left Hip and Pain of the Right Hip      HPI: Pt with bilateral hip pain for the past month or so. The pain is intermittent. At times the pain is significant and it feels like something is "caught". There are times when she is pain free. She takes mobic prn with good relief. The pain is located in the groin. There is no radiation. She works as a  at the [employer]. She is ambulating without assistive device. There is not a limp.    ROS  General: denies fever and chills  Resp: no c/o sob  CVS: no c/o cp  MSK: c/o intermittent bilateral hip pain which is aching and feels like something is "caught"    PE  General: AAOx3, pleasant and cooperative  Resp: respirations even and unlabored  MSK: bilateral hip exam  [facility]  - straight leg raise  - pain with internal rotation  - pain with external rotation  - pain over the greater trochanter    Xray:  Reviewed by me: Right: There is mild DJD and impingement change.    Left: There is mild DJD and impingement change    Assessment:  Bilateral hip impingement    Plan:  Physical therapy orders entered for [facility]  mobic prn  F/u as needed    "

## 2018-03-26 NOTE — LETTER
March 26, 2018      Alireza Smith MD  [address]  [facility] [address]           [facility]  [address]  [facility] [address]  Phone: [phone]          Patient: Kellen Fraser   MR Number: [id number]   YOB: 1975   Date of Visit: 3/26/2018       Dear Dr. Alireza Smith:    Thank you for referring Kellen Fraser to me for evaluation. Attached you will find relevant portions of my assessment and plan of care.    If you have questions, please do not hesitate to call me. I look forward to following Kellen Fraser along with you.    Sincerely,    Caron Joyce NP    Enclosure  CC:  No Recipients    If you would like to receive this communication electronically, please contact [email or web address] or [phone] to request more information on [facility] access.    For providers and/or their staff who would like to refer a patient to [facility], please contact us through our one-stop-shop provider referral line, Eugenia Gannon, at [phone].    If you feel you have received this communication in error or would no longer like to receive these types of communications, please e-mail [email or web address]

## 2018-04-23 ENCOUNTER — TELEPHONE (OUTPATIENT)
Dept: INTERNAL MEDICINE | Facility: CLINIC | Age: 43
End: 2018-04-23

## 2018-04-23 NOTE — TELEPHONE ENCOUNTER
Dr Smith spoke to pt she went to the ED on Friday and was given 4 different types of medicine as of today she is not eating at all she is not able to drink at all only to wet her palliate that is it.   Do you want her to come in today at the 4 oclock slot she may need fluids? Or do you feel she need to possibly go back to the ED?

## 2018-04-23 NOTE — TELEPHONE ENCOUNTER
----- Message from Rupali Nolen sent at 4/23/2018  8:44 AM CDT -----  Contact: deysi  Patient states went to ER on Friday for abdomen pain and throwing up.   Pt states was advised to follow up with PCP.   Pt states want to see Dr Smith still has abdomen pain.   Pt need appointment for today   Please call pt at [phone]

## 2018-04-24 ENCOUNTER — PATIENT MESSAGE (OUTPATIENT)
Dept: INTERNAL MEDICINE | Facility: CLINIC | Age: 43
End: 2018-04-24

## 2018-04-24 NOTE — TELEPHONE ENCOUNTER
Patient reports continued nausea and vomiting.  She filled the Phenergan this morning.  Please call to see if things are starting to improve at all.  If she can't keep any sips of fluid down, please let us know so we can set up urgent care visit to get her some IV fluids if needed.

## 2018-04-25 ENCOUNTER — OFFICE VISIT (OUTPATIENT)
Dept: INTERNAL MEDICINE | Facility: CLINIC | Age: 43
End: 2018-04-25
Payer: OTHER GOVERNMENT

## 2018-04-25 ENCOUNTER — TELEPHONE (OUTPATIENT)
Dept: INTERNAL MEDICINE | Facility: CLINIC | Age: 43
End: 2018-04-25

## 2018-04-25 VITALS
HEIGHT: 64 IN | BODY MASS INDEX: 35.41 KG/M2 | WEIGHT: 207.44 LBS | SYSTOLIC BLOOD PRESSURE: 108 MMHG | HEART RATE: 59 BPM | OXYGEN SATURATION: 98 % | DIASTOLIC BLOOD PRESSURE: 60 MMHG

## 2018-04-25 DIAGNOSIS — R19.7 NAUSEA VOMITING AND DIARRHEA: Primary | ICD-10-CM

## 2018-04-25 DIAGNOSIS — R11.2 NAUSEA VOMITING AND DIARRHEA: Primary | ICD-10-CM

## 2018-04-25 PROCEDURE — 99999 PR PBB SHADOW E&M-EST. PATIENT-LVL III: CPT | Mod: PBBFAC,,, | Performed by: INTERNAL MEDICINE

## 2018-04-25 PROCEDURE — 99215 OFFICE O/P EST HI 40 MIN: CPT | Mod: S$PBB,,, | Performed by: INTERNAL MEDICINE

## 2018-04-25 PROCEDURE — 99213 OFFICE O/P EST LOW 20 MIN: CPT | Mod: PBBFAC | Performed by: INTERNAL MEDICINE

## 2018-04-25 RX ORDER — ONDANSETRON 8 MG/1
8 TABLET, ORALLY DISINTEGRATING ORAL 3 TIMES DAILY
Qty: 15 TABLET | Refills: 1 | Status: SHIPPED | OUTPATIENT
Start: 2018-04-25 | End: 2018-06-14 | Stop reason: ALTCHOICE

## 2018-04-25 RX ORDER — SODIUM CHLORIDE 9 MG/ML
INJECTION, SOLUTION INTRAVENOUS
Status: COMPLETED | OUTPATIENT
Start: 2018-04-25 | End: 2018-04-25

## 2018-04-25 RX ADMIN — SODIUM CHLORIDE 1000 ML: 9 INJECTION, SOLUTION INTRAVENOUS at 02:04

## 2018-04-25 NOTE — PROGRESS NOTES
"Subjective:       Patient ID: Kellen Fraser is a 43 y.o. female.    Chief Complaint: Nausea (1 week) and Vomiting (1 week)    HPI    Last visit with me 02/2018. Since then seen by Rehab, Orthopedics, and ER.     Patient had been on a very healthy eating regimen.  8 days ago she "splurged" for her birthday with fried food, oysters, and 2 small glasses of wine.  As soon as she came home she had to have a bowel movement and went to bed.  The next morning she had stomach pain, feeling that things were "balled up".  She would develop waves of pain that would proceed to vomiting.  This is been going on since it started one week ago.  Tried over-the-counter Dramamine that did not help.  She had some diarrhea last Wednesday, also had diarrhea on Saturday.  No stools since until this morning when she had a few solid stool along with diarrhea.  She has been trying Zofran given to her by the emergency room which has not been too helpful.  She has used Phenergan which causes drowsiness, but even after taking a nap she is still nauseous and vomiting.  Denies fever or chills.  Denies any hematemesis or hematochezia, denies melena.    Review of Systems    As per HPI    Objective:      Physical Exam   Constitutional: She is oriented to person, place, and time. She appears distressed (malaised, uncomfortable).    woman whose Body mass index is 35.61 kg/m².    HENT:   Right Ear: Tympanic membrane and external ear normal. No middle ear effusion.   Left Ear: Tympanic membrane and external ear normal.  No middle ear effusion.   Mouth/Throat: Mucous membranes are dry. No oropharyngeal exudate.   Neck: No thyromegaly present.   Cardiovascular: Normal rate, regular rhythm and normal heart sounds.    Pulmonary/Chest: Effort normal and breath sounds normal. No stridor. She has no wheezes. She has no rales.   Abdominal: Bowel sounds are decreased. There is tenderness (to moderate palpation) in the epigastric area. There is no " "rigidity and no guarding.   Lymphadenopathy:     She has no cervical adenopathy.   Neurological: She is alert and oriented to person, place, and time.   Skin: Skin is warm and dry. She is not diaphoretic. There is erythema (slight facial and neck flushing).   Nursing note and vitals reviewed.      Vitals:    04/25/18 1325 04/25/18 1654   BP: 108/84 108/60   BP Location: Right arm Left arm   Patient Position: Sitting Sitting   BP Method: Large (Manual) Large (Manual)   Pulse: 64 (!) 59   SpO2:  98%   Weight: 94.1 kg (207 lb 7.3 oz)    Height: 5' 4" (1.626 m)      Body mass index is 35.61 kg/m².    RESULTS: Reviewed labs from last 1 months. Reviewed CT scan and abd xray from ER this last week.    Assessment:       1. Nausea vomiting and diarrhea        Plan:   Kellen was seen today for nausea and vomiting.    Diagnoses and all orders for this visit:    Nausea vomiting and diarrhea:  New problem, has been seen in the emergency room, no definitive etiology yet identified.  Most likely viral gastroenteritis given negative blood tests and imaging, however has been going on for 1 week without any improvement.  Symptoms started the day after a large meal, perform stool studies for further evaluation.  Patient was given 1 L of IV fluids in clinic today, vital signs remained stable, no adverse effects, but patient still appears malaised.  Start Zofran scheduled every 6-8 hours regularly at a higher dose.  We will call the patient in 2 days to assess symptoms.  -     0.9%  NaCl infusion; Inject into the vein one time.  -     Stool Exam-Ova,Cysts,Parasites; Future  -     H. pylori antigen, stool; Future  -     CULTURE, STOOL; Future  -     ondansetron (ZOFRAN-ODT) 8 MG TbDL; Take 1 tablet (8 mg total) by mouth 3 (three) times daily.    Follow-up in about 4 months (around 8/25/2018).  Alireza Smith MD  Internal Medicine    Portions of this note were completed using Dragon medical dictation software. Please excuse typographical or " syntax errors.

## 2018-04-25 NOTE — TELEPHONE ENCOUNTER
Patient indicates she was on Advocare since Mardi Gras, she lost 16.5 pounds, she went out to eat at a seafood, fried and charbroiled oysters and [place] salad, since that time she has had n/v and diarrhea with cramping, her face is flushed and she feels flushed, advised patient against walking to restroom alone, fall band placed on her arm #22 gauge needle to right upper arm x attempt, tolerated well. 0.9% NS open, also advised against trying to drive if she was feeling weak. Also reports eating lettuce on a sandwich on the MOnday before 4/16/2018, states she does not know what kind it was but it was not iceburg.

## 2018-04-26 ENCOUNTER — LAB VISIT (OUTPATIENT)
Dept: LAB | Facility: HOSPITAL | Age: 43
End: 2018-04-26
Attending: INTERNAL MEDICINE
Payer: OTHER GOVERNMENT

## 2018-04-26 DIAGNOSIS — R19.7 NAUSEA VOMITING AND DIARRHEA: ICD-10-CM

## 2018-04-26 DIAGNOSIS — R11.2 NAUSEA VOMITING AND DIARRHEA: ICD-10-CM

## 2018-04-26 PROCEDURE — 87045 FECES CULTURE AEROBIC BACT: CPT

## 2018-04-26 PROCEDURE — 87338 HPYLORI STOOL AG IA: CPT

## 2018-04-26 PROCEDURE — 87209 SMEAR COMPLEX STAIN: CPT

## 2018-04-26 PROCEDURE — 87046 STOOL CULTR AEROBIC BACT EA: CPT | Mod: 59

## 2018-04-26 PROCEDURE — 87427 SHIGA-LIKE TOXIN AG IA: CPT

## 2018-04-27 ENCOUNTER — PATIENT MESSAGE (OUTPATIENT)
Dept: INTERNAL MEDICINE | Facility: CLINIC | Age: 43
End: 2018-04-27

## 2018-04-27 ENCOUNTER — TELEPHONE (OUTPATIENT)
Dept: INTERNAL MEDICINE | Facility: CLINIC | Age: 43
End: 2018-04-27

## 2018-04-27 LAB
E COLI SXT1 STL QL IA: NEGATIVE
E COLI SXT2 STL QL IA: NEGATIVE
O+P STL TRI STN: NORMAL

## 2018-04-27 NOTE — TELEPHONE ENCOUNTER
We will see what the stool studies show today. For now continue the Zofran three times a day and try small gentle sips of fluids throughout the day.

## 2018-04-27 NOTE — TELEPHONE ENCOUNTER
----- Message from Alireza Smith MD sent at 4/25/2018  5:30 PM CDT -----  Regarding: Call to see how pt is doing  Please call and see if the patient has had any improvement in her symptoms since seeing her on Weds.

## 2018-04-27 NOTE — TELEPHONE ENCOUNTER
Called Pt. She hasnt had much improvement from our visit with her on Wednesday. She had diarrhea yesterday so was able to do the stool sample yesterday and brought it in. She has vomited a few times since Wednesday. No solids in a week. The only thing she got to keep down was some mint tea lastnight. I told her to call her the office if she isnt seeing anymore improvement.

## 2018-04-30 LAB
BACTERIA STL CULT: NORMAL
H PYLORI AG STL QL: NOT DETECTED

## 2018-06-07 ENCOUNTER — HOSPITAL ENCOUNTER (EMERGENCY)
Facility: HOSPITAL | Age: 43
Discharge: HOME | End: 2018-06-08
Attending: EMERGENCY MEDICINE
Payer: OTHER GOVERNMENT

## 2018-06-07 ENCOUNTER — OFFICE VISIT (OUTPATIENT)
Dept: INTERNAL MEDICINE | Facility: CLINIC | Age: 43
End: 2018-06-07
Payer: OTHER GOVERNMENT

## 2018-06-07 VITALS
OXYGEN SATURATION: 98 % | TEMPERATURE: 99 F | WEIGHT: 214.94 LBS | SYSTOLIC BLOOD PRESSURE: 141 MMHG | HEIGHT: 64 IN | BODY MASS INDEX: 36.7 KG/M2 | DIASTOLIC BLOOD PRESSURE: 74 MMHG | HEART RATE: 75 BPM

## 2018-06-07 DIAGNOSIS — R07.1 CHEST PAIN ON BREATHING: ICD-10-CM

## 2018-06-07 DIAGNOSIS — R10.9 FLANK PAIN: Primary | ICD-10-CM

## 2018-06-07 DIAGNOSIS — R09.1 PLEURISY: ICD-10-CM

## 2018-06-07 DIAGNOSIS — R07.9 CHEST PAIN: Primary | ICD-10-CM

## 2018-06-07 LAB
ALBUMIN SERPL BCP-MCNC: 3.8 G/DL
ALP SERPL-CCNC: 88 U/L
ALT SERPL W/O P-5'-P-CCNC: 18 U/L
ANION GAP SERPL CALC-SCNC: 12 MMOL/L
AST SERPL-CCNC: 14 U/L
B-HCG UR QL: NEGATIVE
BASOPHILS # BLD AUTO: 0.06 K/UL
BASOPHILS NFR BLD: 0.5 %
BILIRUB SERPL-MCNC: 0.5 MG/DL
BILIRUB SERPL-MCNC: ABNORMAL MG/DL
BLOOD URINE, POC: ABNORMAL
BUN SERPL-MCNC: 13 MG/DL
CALCIUM SERPL-MCNC: 9.2 MG/DL
CHLORIDE SERPL-SCNC: 105 MMOL/L
CO2 SERPL-SCNC: 23 MMOL/L
COLOR, POC UA: YELLOW
CREAT SERPL-MCNC: 0.9 MG/DL
CTP QC/QA: YES
D DIMER PPP IA.FEU-MCNC: 0.86 MG/L FEU
DIFFERENTIAL METHOD: ABNORMAL
EOSINOPHIL # BLD AUTO: 0.1 K/UL
EOSINOPHIL NFR BLD: 0.7 %
ERYTHROCYTE [DISTWIDTH] IN BLOOD BY AUTOMATED COUNT: 14.1 %
EST. GFR  (AFRICAN AMERICAN): >60 ML/MIN/1.73 M^2
EST. GFR  (NON AFRICAN AMERICAN): >60 ML/MIN/1.73 M^2
GLUCOSE SERPL-MCNC: 83 MG/DL
GLUCOSE UR QL STRIP: NORMAL
HCT VFR BLD AUTO: 41.9 %
HGB BLD-MCNC: 13.2 G/DL
IMM GRANULOCYTES # BLD AUTO: 0.08 K/UL
IMM GRANULOCYTES NFR BLD AUTO: 0.7 %
KETONES UR QL STRIP: ABNORMAL
LEUKOCYTE ESTERASE URINE, POC: ABNORMAL
LYMPHOCYTES # BLD AUTO: 2.5 K/UL
LYMPHOCYTES NFR BLD: 20.7 %
MCH RBC QN AUTO: 27.7 PG
MCHC RBC AUTO-ENTMCNC: 31.5 G/DL
MCV RBC AUTO: 88 FL
MONOCYTES # BLD AUTO: 0.8 K/UL
MONOCYTES NFR BLD: 6.3 %
NEUTROPHILS # BLD AUTO: 8.4 K/UL
NEUTROPHILS NFR BLD: 71.1 %
NITRITE, POC UA: ABNORMAL
NRBC BLD-RTO: 0 /100 WBC
PH, POC UA: 5
PLATELET # BLD AUTO: 245 K/UL
PMV BLD AUTO: 10.8 FL
POTASSIUM SERPL-SCNC: 3.5 MMOL/L
PROT SERPL-MCNC: 7.6 G/DL
PROTEIN, POC: ABNORMAL
RBC # BLD AUTO: 4.76 M/UL
SODIUM SERPL-SCNC: 140 MMOL/L
SPECIFIC GRAVITY, POC UA: 1.01
TROPONIN I SERPL DL<=0.01 NG/ML-MCNC: <0.006 NG/ML
UROBILINOGEN, POC UA: NORMAL
WBC # BLD AUTO: 11.86 K/UL

## 2018-06-07 PROCEDURE — 25000003 PHARM REV CODE 250: Performed by: EMERGENCY MEDICINE

## 2018-06-07 PROCEDURE — 81002 URINALYSIS NONAUTO W/O SCOPE: CPT | Mod: PBBFAC | Performed by: INTERNAL MEDICINE

## 2018-06-07 PROCEDURE — 93005 ELECTROCARDIOGRAM TRACING: CPT

## 2018-06-07 PROCEDURE — 81025 URINE PREGNANCY TEST: CPT | Performed by: EMERGENCY MEDICINE

## 2018-06-07 PROCEDURE — 25500020 PHARM REV CODE 255: Performed by: EMERGENCY MEDICINE

## 2018-06-07 PROCEDURE — 85379 FIBRIN DEGRADATION QUANT: CPT

## 2018-06-07 PROCEDURE — 84484 ASSAY OF TROPONIN QUANT: CPT

## 2018-06-07 PROCEDURE — 93010 ELECTROCARDIOGRAM REPORT: CPT | Mod: ,,, | Performed by: INTERNAL MEDICINE

## 2018-06-07 PROCEDURE — 99213 OFFICE O/P EST LOW 20 MIN: CPT | Mod: S$PBB,,, | Performed by: INTERNAL MEDICINE

## 2018-06-07 PROCEDURE — 99214 OFFICE O/P EST MOD 30 MIN: CPT | Mod: PBBFAC | Performed by: INTERNAL MEDICINE

## 2018-06-07 PROCEDURE — 87086 URINE CULTURE/COLONY COUNT: CPT

## 2018-06-07 PROCEDURE — 99284 EMERGENCY DEPT VISIT MOD MDM: CPT | Mod: ,,, | Performed by: EMERGENCY MEDICINE

## 2018-06-07 PROCEDURE — 99284 EMERGENCY DEPT VISIT MOD MDM: CPT | Mod: 25,27

## 2018-06-07 PROCEDURE — 80053 COMPREHEN METABOLIC PANEL: CPT

## 2018-06-07 PROCEDURE — 85025 COMPLETE CBC W/AUTO DIFF WBC: CPT

## 2018-06-07 PROCEDURE — 99999 PR PBB SHADOW E&M-EST. PATIENT-LVL IV: CPT | Mod: PBBFAC,,, | Performed by: INTERNAL MEDICINE

## 2018-06-07 RX ORDER — ACETAMINOPHEN 500 MG
1000 TABLET ORAL
Status: COMPLETED | OUTPATIENT
Start: 2018-06-07 | End: 2018-06-07

## 2018-06-07 RX ADMIN — IOHEXOL 100 ML: 350 INJECTION, SOLUTION INTRAVENOUS at 11:06

## 2018-06-07 RX ADMIN — ACETAMINOPHEN 1000 MG: 500 TABLET, FILM COATED ORAL at 08:06

## 2018-06-08 VITALS
HEIGHT: 64 IN | HEART RATE: 72 BPM | TEMPERATURE: 99 F | WEIGHT: 214 LBS | OXYGEN SATURATION: 98 % | SYSTOLIC BLOOD PRESSURE: 124 MMHG | BODY MASS INDEX: 36.54 KG/M2 | RESPIRATION RATE: 20 BRPM | DIASTOLIC BLOOD PRESSURE: 66 MMHG

## 2018-06-08 LAB
BACTERIA UR CULT: NORMAL
BACTERIA UR CULT: NORMAL

## 2018-06-08 NOTE — ED NOTES
Patient identifiers for Kellen Fraser checked and correct.  LOC: The patient is awake, alert and aware of environment with an appropriate affect, the patient is oriented x 3 and speaking appropriately.  APPEARANCE: Patient slightly uncomfortable and in no acute distress, patient is clean and well groomed, patient's clothing are properly fastened.  SKIN: The skin is warm and dry, patient has age appropriate skin turgor and moist mucus membranes, skin intact, no breakdown or brusing noted.  MUSKULOSKELETAL: Patient moving all extremities well, no obvious swelling or deformities noted.  RESPIRATORY: Airway is open and patent, respirations are spontaneous, patient has a normal effort and rate, no accessory muscle use noted.  Clear breath sounds bilaterally. Pt complains of pain worse on inspiration  CARDIAC: Patient has a normal rate and rhythm, no periphreal edema noted, capillary refill < 3 seconds.  ABDOMEN: Soft and non tender to palpation, no distention noted.  Normoactive bowel sounds x4.  NEUROLOGIC: PERRL, facial expression is symmetrical, bilateral hand grasp equal and even, normal sensation in all extremities when touched with a finger.

## 2018-06-08 NOTE — PROGRESS NOTES
Subjective:       Patient ID: Kellen Fraser is a 43 y.o. female.    Chief Complaint: Flank Pain    Sudden onset yesterday on sharp pleuritic left posterior, lateral and anterior chest pain.  No injury,no rash.  No use of BCP, no prolonged sitting, no pain in either leg      Review of Systems   Constitutional: Negative for activity change, chills, fatigue and fever.   HENT: Negative for congestion, ear pain, nosebleeds, postnasal drip, sinus pressure and sore throat.    Eyes: Negative.  Negative for visual disturbance.   Respiratory: Negative for cough, chest tightness, shortness of breath and wheezing.    Cardiovascular: Negative for chest pain.   Gastrointestinal: Negative for abdominal pain, diarrhea, nausea and vomiting.   Genitourinary: Negative for difficulty urinating, dysuria, frequency and urgency.   Musculoskeletal: Negative for arthralgias and neck stiffness.   Skin: Negative for rash.   Neurological: Negative for dizziness, weakness and headaches.   Psychiatric/Behavioral: Negative for sleep disturbance. The patient is not nervous/anxious.        Objective:      Physical Exam   Constitutional: She is oriented to person, place, and time. She appears well-developed and well-nourished.  Non-toxic appearance. She appears distressed.       HENT:   Head: Normocephalic and atraumatic.   Right Ear: Tympanic membrane, external ear and ear canal normal.   Left Ear: Tympanic membrane, external ear and ear canal normal.   Eyes: EOM are normal. Pupils are equal, round, and reactive to light. No scleral icterus.   Neck: Normal range of motion. Neck supple. No thyromegaly present.   Cardiovascular: Normal rate, regular rhythm and normal heart sounds.    Pulmonary/Chest: Effort normal. She has decreased breath sounds in the right upper field, the right middle field, the right lower field, the left upper field, the left middle field and the left lower field. She has no wheezes. She has no rhonchi. She has no rales.                Abdominal: Soft. Bowel sounds are normal. She exhibits no mass. There is no tenderness. There is no rebound.   Musculoskeletal: Normal range of motion.   Lymphadenopathy:     She has no cervical adenopathy.   Neurological: She is alert and oriented to person, place, and time. She has normal reflexes. She displays normal reflexes. No cranial nerve deficit. She exhibits normal muscle tone. Coordination normal.   Skin: Skin is warm and dry.   Psychiatric: She has a normal mood and affect. Her behavior is normal.       Assessment:       1. Flank pain    2. Chest pain on breathing        Plan:   Kellen was seen today for flank pain.    Diagnoses and all orders for this visit:    Flank pain  -     POCT urine dipstick without microscope  -     Urine culture    Chest pain on breathing  -     Refer to Emergency Dept.

## 2018-06-08 NOTE — ED PROVIDER NOTES
"SCRIBE #1 NOTE: I, Nina Elaine, am scribing for, and in the presence of,  Dr. Freedman . I have scribed the entire note.        CC: Pleurisy (pt complains of pain with inspiration x 1 day across the left side of chest and side. pt denies nausea or chest pain but complains of tingling to left hand. pt is aox 3. )      HPI: Kellen Fraser is a 43 y.o. year old female with comorbidity of hypothyroidism who presents to the ED complaining of SOB and left sided back pain radiated to left chest with breathing.   Pt states pain began yesterday and notes she is unable to lay on her left side due to pain. She states it sometimes feels like "something is fluttering in the there". She used an inhaler this morning with no relief of symptoms. Pt also reports tingling to the left 4th and 5th fingers which she has had before but began again yesterday. She denies cough, fever, rhinorrhea, sore throat, rash, recent injury to area, and recent travel. She denies history of collapsed lung and blood clots.         Past Medical History:   Diagnosis Date    BRCA negative 11/2014    Cervical disc disease     C5-C6, impacting thecal sac    Gallbladder sludge     Hypothyroidism     Thyroid cancer     Uterine fibroid      Past Surgical History:   Procedure Laterality Date    BREAST BIOPSY Right     exc bx    CHOLECYSTECTOMY  2012    FOOT SURGERY Right     THYROIDECTOMY  2004     Family History   Problem Relation Age of Onset    No Known Problems Mother     Hypertension Father     Diabetes Father     Thyroid cancer Sister     Hodgkin's lymphoma Sister     Cancer Sister         Hodgkin's, thyroid    Leukemia Brother     Cancer Brother         Acute leukemia    Cancer Maternal Grandmother         Breast    Diabetes Maternal Grandmother     Breast cancer Maternal Grandmother 70    Heart disease Maternal Grandfather     Cancer Paternal Grandfather         Lymphoma    Ovarian cancer Neg Hx     Melanoma Neg Hx      No " current facility-administered medications on file prior to encounter.      Current Outpatient Prescriptions on File Prior to Encounter   Medication Sig Dispense Refill    albuterol 90 mcg/actuation inhaler Inhale 2 puffs into the lungs every 6 (six) hours as needed. 18 g 5    amlodipine (NORVASC) 5 MG tablet Take 1 tablet (5 mg total) by mouth once daily. 30 tablet 11    levothyroxine (SYNTHROID, LEVOTHROID) 175 MCG tablet Take 1 tablet (175 mcg total) by mouth once daily. 90 tablet 3    omeprazole (PRILOSEC) 20 MG capsule Take 1 capsule (20 mg total) by mouth once daily. 30 capsule 0    DULoxetine (CYMBALTA) 30 MG capsule Take 1 capsule (30 mg total) by mouth 2 (two) times daily. Take 1 cap daily x 5 days. After 5 days, increase to 1 cap BID. 60 capsule 11    gabapentin (NEURONTIN) 300 MG capsule Take 1 capsule (300 mg total) by mouth 3 (three) times daily. 90 capsule 11    meloxicam (MOBIC) 15 MG tablet Take 1 tablet (15 mg total) by mouth once daily. 30 tablet 3    ondansetron (ZOFRAN-ODT) 8 MG TbDL Take 1 tablet (8 mg total) by mouth 3 (three) times daily. 15 tablet 1    promethazine (PHENERGAN) 25 MG tablet Take 1 tablet (25 mg total) by mouth every 6 (six) hours as needed for Nausea. 15 tablet 0     Morphine; Bactrim [sulfamethoxazole-trimethoprim]; Demerol [meperidine]; Dilaudid [hydromorphone (bulk)]; Erythromycin; Influenza virus vaccines; Penicillins; Reglan [metoclopramide]; and Toradol [ketorolac]  Social History     Social History    Marital status:      Spouse name: N/A    Number of children: N/A    Years of education: N/A     Social History Main Topics    Smoking status: Never Smoker    Smokeless tobacco: Never Used    Alcohol use Yes      Comment: socially    Drug use: No    Sexual activity: Yes     Other Topics Concern    Not on file     Social History Narrative    No narrative on file       ROS:     Constitutional : neg  HEENT neg  Resp positive for SOB.   Cardiac  neg  GI  neg   neg  Neuro positive for tingling of left 4th and 5th digit.   Heme/Immune: neg  Endo neg  Skin neg  MSK positive for left sided back pain.    PHYSICAL EXAM:  Vitals:    06/07/18 1950   BP: 131/60   Pulse: 86   Resp: 20   Temp: 98.9 °F (37.2 °C)         PHYSICAL EXAM:   general: comfortable, in no acute distress  VS: triage VS reviewed  HEENT: NC/AT, PERRL, EOMI  Neck: trachea midline  CV: RRR, no m/r/g, no LE pitting edema,tenderness to palpation over the left posterior back around the mid scapular area.   Resp: CTAB  ABD:  ND, + normal BS, NT  Renal: No CVAT  Neuro: AAO x 3, 5/5 muscle strength in upper and lower extremities, sensation grossly intact, face symmetric, speech normal  Ext: no deformity, +2 symmetrical peripheral pulses.      DATA & INTERVENTIONS:    LABS reviewed:  Labs Reviewed   POCT URINE PREGNANCY       RADIOLOGY reviewed:  Imaging Results    None         MEDICATIONS/FLUIDS:  Medications - No data to display      MDM: 43 y.o. female with left sided back pain radiating to the left chest axillary area. No midline tenderness in the T-spine area but tenderness in the lower C-spine. She reported tingling in the left 4th and 5th digits. She has had the tingling in the digits before. She has been having problems with herniated disc in the cervical area. Unlikely the paraesthesia in the finger are related to chest pain. Chest pain is pleuritic. Differential includes PNA, pleural effusion, PTX, PE, and zoster. No skin rash present on exam. Lungs clear to auscultation bilaterally. Pt was seen in the clinic earlier today. There was good documentation of abnormal lung auscultation however breath sounds are clear and normal in the ED. Will send blood work, EKG, and chest x-ray.    9:26 PM   D-dimer elevated at 0.86. Negative pregnancy test. CMP normal. CBC shows no abnormalities. Chest x-ray shows no PTX, pleural effusion, normal cardiac silhouette.     EKG: Normal sinus rhythm at 79. Incomplete right  bundle branch block seen also on the EKG from October 23, 2017.       CT pe neg for PE,+ incidental findings discussed w/ the patient and copy of the CT report was given to the patient    The patient has been carefully educated about symptoms and conditions that should prompt immediate return to the ED for recheck or further evaluation. Told to return immediately for any new or worsening or progressive symptoms. In addition, patient told to return to the ED if they are unable to obtain adequate o/p follow-up as they have been directed. Patient expressed good undertanding of these instructions.        IMPRESSION:  1.) Pleuritic chest pain,MSK vs pleuresy      Dispo: Discharge    Critical Care Time: N/A     Herlinda Freedman MD  06/08/18 0012

## 2018-06-08 NOTE — ED TRIAGE NOTES
Pt complains of shortness of breath and chain that started yesterday. Pt states that the chest pain is worse when she takes a breath. Denies cough, fever, and chills

## 2018-06-12 ENCOUNTER — PATIENT MESSAGE (OUTPATIENT)
Dept: INTERNAL MEDICINE | Facility: CLINIC | Age: 43
End: 2018-06-12

## 2018-06-14 ENCOUNTER — OFFICE VISIT (OUTPATIENT)
Dept: INTERNAL MEDICINE | Facility: CLINIC | Age: 43
End: 2018-06-14
Payer: OTHER GOVERNMENT

## 2018-06-14 ENCOUNTER — LAB VISIT (OUTPATIENT)
Dept: LAB | Facility: HOSPITAL | Age: 43
End: 2018-06-14
Attending: INTERNAL MEDICINE
Payer: OTHER GOVERNMENT

## 2018-06-14 VITALS
HEIGHT: 64 IN | HEART RATE: 66 BPM | DIASTOLIC BLOOD PRESSURE: 80 MMHG | WEIGHT: 211.63 LBS | BODY MASS INDEX: 36.13 KG/M2 | SYSTOLIC BLOOD PRESSURE: 116 MMHG

## 2018-06-14 DIAGNOSIS — R10.12 LUQ PAIN: ICD-10-CM

## 2018-06-14 DIAGNOSIS — E89.0 POST-SURGICAL HYPOTHYROIDISM: ICD-10-CM

## 2018-06-14 DIAGNOSIS — R09.1 PLEURISY: Primary | ICD-10-CM

## 2018-06-14 LAB
LIPASE SERPL-CCNC: 19 U/L
TSH SERPL DL<=0.005 MIU/L-ACNC: 1.16 UIU/ML

## 2018-06-14 PROCEDURE — 99213 OFFICE O/P EST LOW 20 MIN: CPT | Mod: PBBFAC | Performed by: INTERNAL MEDICINE

## 2018-06-14 PROCEDURE — 36415 COLL VENOUS BLD VENIPUNCTURE: CPT

## 2018-06-14 PROCEDURE — 84443 ASSAY THYROID STIM HORMONE: CPT

## 2018-06-14 PROCEDURE — 99999 PR PBB SHADOW E&M-EST. PATIENT-LVL III: CPT | Mod: PBBFAC,,, | Performed by: INTERNAL MEDICINE

## 2018-06-14 PROCEDURE — 99214 OFFICE O/P EST MOD 30 MIN: CPT | Mod: S$PBB,,, | Performed by: INTERNAL MEDICINE

## 2018-06-14 PROCEDURE — 83690 ASSAY OF LIPASE: CPT

## 2018-06-14 RX ORDER — PREDNISONE 20 MG/1
TABLET ORAL
Qty: 15 TABLET | Refills: 0 | Status: SHIPPED | OUTPATIENT
Start: 2018-06-14 | End: 2018-12-31

## 2018-06-14 NOTE — PATIENT INSTRUCTIONS
Please take prednisone as directed for 1 week. If no improvement in pain please notify the office, we may need to proceed with CT of the abdomen.

## 2018-06-14 NOTE — PROGRESS NOTES
"Subjective:       Patient ID: Kellen Fraser is a 43 y.o. female.    Chief Complaint: Follow-up (from hospital/ sob on left side)    [facility]    Last visit with me 04/2018. Seen in ER this mo for chest pain.     Pain still present. Pain worse at night, Mobic helps. Takes once daily. Uses Tylenol also. No rash. No pain with arm mvmt. No tenderness to palpation. Sitting and sleeping is worse. Driving is bad. Better if leaning to her right side. Muscle relaxers didn't work. No nausea or vomiting. No constipation or diarrhea.    Review of Systems    As per HPI    Objective:      Physical Exam   Constitutional: She is oriented to person, place, and time. She appears distressed (mild discomfort when sitting, leans to her R side).    woman whose Body mass index is 36.33 kg/m².    Cardiovascular: Normal rate, regular rhythm and normal heart sounds.    Pulmonary/Chest: Effort normal and breath sounds normal. She has no wheezes. She has no rales. She exhibits no tenderness.   Abdominal: Soft. She exhibits no distension and no mass. There is tenderness (to deep palpation) in the left upper quadrant. There is no guarding.       Neurological: She is alert and oriented to person, place, and time.   Nursing note and vitals reviewed.      Vitals:    06/14/18 0956   BP: 116/80   BP Location: Right arm   Patient Position: Sitting   BP Method: Large (Manual)   Pulse: 66   Weight: 96 kg (211 lb 10.3 oz)   Height: 5' 4" (1.626 m)     Body mass index is 36.33 kg/m².    RESULTS: Reviewed labs from last 6 months. I have personally inspected the CT scan and CXR images performed 6/7/18.    Assessment:       1. Pleurisy    2. Post-surgical hypothyroidism    3. LUQ pain        Plan:   Kellen was seen today for follow-up.    Diagnoses and all orders for this visit:    Pleurisy:  Recent dx, persistent, some mild improvement with Mobic daily. CT scan negative for aortic pathology or abdominal problem that would seem to be cause of the " symptoms. Further anti-inflammation with steroids, counseled about side effects.  -     predniSONE (DELTASONE) 20 MG tablet; Take 3 tabs daily for 3 days. Then 2 tabs daily for 2 days. Then 1 tab for 2 days.    Post-surgical hypothyroidism:  Prior dx, was well controlled on Synthroid but last TSH elevated, recheck.  -     TSH; Future    LUQ pain:  New problem. Unclear cause of tenderness on today's exam. CTA chest didn't indicate problem in LUQ. Check lipase. Please notify the office if the symptoms persist or worsen.   -     Lipase; Future    Follow-up in about 6 months (around 12/14/2018) for follow up visit. labs today.  Alireza Smith MD  Internal Medicine    Portions of this note were completed using medical dictation software. Please excuse typographical or syntax errors that were missed on review.

## 2018-09-11 ENCOUNTER — OFFICE VISIT (OUTPATIENT)
Dept: URGENT CARE | Facility: CLINIC | Age: 43
End: 2018-09-11
Payer: OTHER GOVERNMENT

## 2018-09-11 VITALS
HEART RATE: 69 BPM | DIASTOLIC BLOOD PRESSURE: 78 MMHG | OXYGEN SATURATION: 98 % | BODY MASS INDEX: 36.7 KG/M2 | SYSTOLIC BLOOD PRESSURE: 119 MMHG | WEIGHT: 215 LBS | TEMPERATURE: 98 F | HEIGHT: 64 IN

## 2018-09-11 DIAGNOSIS — J01.40 ACUTE PANSINUSITIS, RECURRENCE NOT SPECIFIED: Primary | ICD-10-CM

## 2018-09-11 PROCEDURE — 99214 OFFICE O/P EST MOD 30 MIN: CPT | Mod: 25,S$GLB,, | Performed by: EMERGENCY MEDICINE

## 2018-09-11 PROCEDURE — 96372 THER/PROPH/DIAG INJ SC/IM: CPT | Mod: S$GLB,,, | Performed by: EMERGENCY MEDICINE

## 2018-09-11 RX ORDER — DOXYCYCLINE 100 MG/1
100 CAPSULE ORAL EVERY 12 HOURS
Qty: 20 CAPSULE | Refills: 0 | Status: SHIPPED | OUTPATIENT
Start: 2018-09-11 | End: 2018-09-21

## 2018-09-11 RX ORDER — BETAMETHASONE SODIUM PHOSPHATE AND BETAMETHASONE ACETATE 3; 3 MG/ML; MG/ML
6 INJECTION, SUSPENSION INTRA-ARTICULAR; INTRALESIONAL; INTRAMUSCULAR; SOFT TISSUE
Status: COMPLETED | OUTPATIENT
Start: 2018-09-11 | End: 2018-09-11

## 2018-09-11 RX ADMIN — BETAMETHASONE SODIUM PHOSPHATE AND BETAMETHASONE ACETATE 6 MG: 3; 3 INJECTION, SUSPENSION INTRA-ARTICULAR; INTRALESIONAL; INTRAMUSCULAR; SOFT TISSUE at 05:09

## 2018-09-11 NOTE — PATIENT INSTRUCTIONS
Pedro Luis Tang MD  Go to the Emergency Department for any problems  Call your PCP for follow up next available.    Sinusitis (Antibiotic Treatment)    The sinuses are air-filled spaces within the bones of the face. They connect to the inside of the nose. Sinusitis is an inflammation of the tissue lining the sinus cavity. Sinus inflammation can occur during a cold. It can also be due to allergies to pollens and other particles in the air. Sinusitis can cause symptoms of sinus congestion and fullness. A sinus infection causes fever, headache and facial pain. There is often green or yellow drainage from the nose or into the back of the throat (post-nasal drip). You have been given antibiotics to treat this condition.  Home care:  · Take the full course of antibiotics as instructed. Do not stop taking them, even if you feel better.  · Drink plenty of water, hot tea, and other liquids. This may help thin mucus. It also may promote sinus drainage.  · Heat may help soothe painful areas of the face. Use a towel soaked in hot water. Or,  the shower and direct the hot spray onto your face. Using a vaporizer along with a menthol rub at night may also help.   · An expectorant containing guaifenesin may help thin the mucus and promote drainage from the sinuses.  · Over-the-counter decongestants may be used unless a similar medicine was prescribed. Nasal sprays work the fastest. Use one that contains phenylephrine or oxymetazoline. First blow the nose gently. Then use the spray. Do not use these medicines more often than directed on the label or symptoms may get worse. You may also use tablets containing pseudoephedrine. Avoid products that combine ingredients, because side effects may be increased. Read labels. You can also ask the pharmacist for help. (NOTE: Persons with high blood pressure should not use decongestants. They can raise blood pressure.)  · Over-the-counter antihistamines may help if allergies contributed  to your sinusitis.    · Do not use nasal rinses or irrigation during an acute sinus infection, unless told to by your health care provider. Rinsing may spread the infection to other sinuses.  · Use acetaminophen or ibuprofen to control pain, unless another pain medicine was prescribed. (If you have chronic liver or kidney disease or ever had a stomach ulcer, talk with your doctor before using these medicines. Aspirin should never be used in anyone under 18 years of age who is ill with a fever. It may cause severe liver damage.)  · Don't smoke. This can worsen symptoms.  Follow-up care  Follow up with your healthcare provider or our staff if you are not improving within the next week.  When to seek medical advice  Call your healthcare provider if any of these occur:  · Facial pain or headache becoming more severe  · Stiff neck  · Unusual drowsiness or confusion  · Swelling of the forehead or eyelids  · Vision problems, including blurred or double vision  · Fever of 100.4ºF (38ºC) or higher, or as directed by your healthcare provider  · Seizure  · Breathing problems  · Symptoms not resolving within 10 days  Date Last Reviewed: 4/13/2015  © 0125-4434 [employer]. [address] [place] [address]. All rights reserved. This information is not intended as a substitute for professional medical care. Always follow your healthcare professional's instructions.

## 2018-09-11 NOTE — LETTER
September 11, 2018      [facility]  [address] [address], [address]  [place] [address]  Phone: [phone]  Fax: [phone]       Patient: Kellen Fraser   YOB: 1975  Date of Visit: 09/11/2018    To Whom It May Concern:    Derrick Fraser  was at [facility] on 09/11/2018. She may return to work/school on 9/13/18, earlier if feels better with no restrictions. If you have any questions or concerns, or if I can be of further assistance, please do not hesitate to contact me.    Sincerely,            Pedro Luis Tang MD

## 2018-09-11 NOTE — PROGRESS NOTES
"Subjective:       Patient ID: Kellen Fraser is a 43 y.o. female.    Vitals:  height is 5' 4" (1.626 m) and weight is 97.5 kg (215 lb). Her oral temperature is 97.7 °F (36.5 °C). Her blood pressure is 119/78 and her pulse is 69. Her oxygen saturation is 98%.     Chief Complaint: Sinus Problem    7 d hx sinus congestion/pressure/post nasal drip with increased right max sinus pain today, is not pregnant.      Sinus Problem   This is a new problem. The current episode started in the past 7 days. The problem has been gradually worsening since onset. There has been no fever. The fever has been present for less than 1 day. Her pain is at a severity of 6/10. The pain is moderate. Associated symptoms include congestion, coughing and ear pain. Pertinent negatives include no chills, headaches, hoarse voice, shortness of breath or sore throat. Past treatments include acetaminophen. The treatment provided no relief.     Review of Systems   Constitution: Negative for chills, fever and malaise/fatigue.   HENT: Positive for congestion and ear pain. Negative for hoarse voice and sore throat.    Eyes: Negative for discharge and redness.   Cardiovascular: Negative for chest pain, dyspnea on exertion and leg swelling.   Respiratory: Positive for cough and sputum production. Negative for shortness of breath and wheezing.    Musculoskeletal: Negative for myalgias.   Gastrointestinal: Negative for abdominal pain and nausea.   Neurological: Negative for headaches.       Objective:      Physical Exam   Constitutional: She is oriented to person, place, and time. She appears well-developed and well-nourished.   HENT:   Head: Normocephalic and atraumatic.   Right Ear: Tympanic membrane, external ear and ear canal normal.   Left Ear: Tympanic membrane, external ear and ear canal normal.   Nose: Mucosal edema present. No rhinorrhea. Right sinus exhibits maxillary sinus tenderness and frontal sinus tenderness. Left sinus exhibits maxillary sinus " tenderness. Left sinus exhibits no frontal sinus tenderness.   Mouth/Throat: Uvula is midline, oropharynx is clear and moist and mucous membranes are normal.   Eyes: EOM are normal. Pupils are equal, round, and reactive to light.   Neck: Normal range of motion. Neck supple.   Cardiovascular: Normal rate, regular rhythm and normal heart sounds.   Pulmonary/Chest: Breath sounds normal.   Musculoskeletal: Normal range of motion.   Lymphadenopathy:     She has cervical adenopathy.   Neurological: She is alert and oriented to person, place, and time.   Skin: Skin is warm and dry.   Psychiatric: She has a normal mood and affect. Her behavior is normal.       Assessment:       1. Acute pansinusitis, recurrence not specified        Plan:         Acute pansinusitis, recurrence not specified  -     betamethasone acetate-betamethasone sodium phosphate injection 6 mg; Inject 1 mL (6 mg total) into the muscle one time.  -     doxycycline (VIBRAMYCIN) 100 MG Cap; Take 1 capsule (100 mg total) by mouth every 12 (twelve) hours. Generic or equivalent alternative OK for 10 days  Dispense: 20 capsule; Refill: 0        Pedro Luis Tang MD  Go to the Emergency Department for any problems  Call your PCP for follow up next available.

## 2018-09-14 ENCOUNTER — TELEPHONE (OUTPATIENT)
Dept: URGENT CARE | Facility: CLINIC | Age: 43
End: 2018-09-14

## 2018-12-07 NOTE — LETTER
February 14, 2017      Juan Luis Alvarez MD  [address]  [place] [address]           [facility]  [address]  [place] [address]  Phone: [phone]  Fax: [phone]          Patient: Kellen Fraser   MR Number: [id number]   YOB: 1975   Date of Visit: 2/14/2017       Dear Dr. Juan Luis Alvarez:    Thank you for referring Kellen Fraser to me for evaluation. Attached you will find relevant portions of my assessment and plan of care.    If you have questions, please do not hesitate to call me. I look forward to following Kellen Fraser along with you.    Sincerely,    Lisa Gupta MD    Enclosure  CC:  No Recipients    If you would like to receive this communication electronically, please contact [email or web address] or [phone] to request more information on [employer] Link access.    For providers and/or their staff who would like to refer a patient to [facility], please contact us through our one-stop-shop provider referral line, [facility], at [phone].    If you feel you have received this communication in error or would no longer like to receive these types of communications, please e-mail [email or web address]         
No

## 2018-12-17 PROBLEM — J01.40 ACUTE PANSINUSITIS: Status: RESOLVED | Noted: 2018-09-11 | Resolved: 2018-12-17

## 2018-12-31 ENCOUNTER — OFFICE VISIT (OUTPATIENT)
Dept: INTERNAL MEDICINE | Facility: CLINIC | Age: 43
End: 2018-12-31
Payer: OTHER GOVERNMENT

## 2018-12-31 VITALS
TEMPERATURE: 99 F | BODY MASS INDEX: 38.58 KG/M2 | DIASTOLIC BLOOD PRESSURE: 80 MMHG | OXYGEN SATURATION: 96 % | HEIGHT: 64 IN | HEART RATE: 82 BPM | SYSTOLIC BLOOD PRESSURE: 106 MMHG | WEIGHT: 226 LBS

## 2018-12-31 DIAGNOSIS — A08.4 VIRAL GASTROENTERITIS: Primary | ICD-10-CM

## 2018-12-31 PROCEDURE — 99214 OFFICE O/P EST MOD 30 MIN: CPT | Mod: S$PBB,,, | Performed by: INTERNAL MEDICINE

## 2018-12-31 PROCEDURE — 99999 PR PBB SHADOW E&M-EST. PATIENT-LVL IV: CPT | Mod: PBBFAC,,, | Performed by: INTERNAL MEDICINE

## 2018-12-31 PROCEDURE — 99214 OFFICE O/P EST MOD 30 MIN: CPT | Mod: PBBFAC | Performed by: INTERNAL MEDICINE

## 2018-12-31 RX ORDER — DICYCLOMINE HYDROCHLORIDE 20 MG/1
20 TABLET ORAL EVERY 6 HOURS PRN
Qty: 30 TABLET | Refills: 1 | Status: SHIPPED | OUTPATIENT
Start: 2018-12-31 | End: 2019-01-30

## 2018-12-31 RX ORDER — PROMETHAZINE HYDROCHLORIDE 25 MG/1
25 TABLET ORAL EVERY 6 HOURS PRN
Qty: 30 TABLET | Refills: 0 | Status: SHIPPED | OUTPATIENT
Start: 2018-12-31 | End: 2019-01-30

## 2018-12-31 RX ORDER — SODIUM CHLORIDE 9 MG/ML
INJECTION, SOLUTION INTRAVENOUS
Status: SHIPPED | OUTPATIENT
Start: 2018-12-31

## 2018-12-31 NOTE — LETTER
December 31, 2018    Kellen Fraser  [address] [facility] [place] [address]             [facility]  [address]  [facility] [address]  Phone: [phone]  Fax: [phone] To Whom It May Concern:     Ms Kellen Fraser was seen in my clinic today. She has had an ongoing infection for the last week. Please excuse her from work duties missed on account of illness. At this time it cannot be determined when the symptoms will resolve, she may need to be out for additional days after today until she recovers.      If you have any questions or concerns, please don't hesitate to call.    Sincerely,         Alireza Smith MD

## 2018-12-31 NOTE — PROGRESS NOTES
"Subjective:       Patient ID: Kellen Fraser is a 43 y.o. female.    Chief Complaint: Nausea (all syptoms have been lasting for 6 days); Emesis; Diarrhea; and Fatigue    HPI    Last visit with me June 2018. symptoms started 6 days ago and getting worse. During [place] had raw oysters; that night cousin started with similar symptoms. However her symptoms didn't start until a few days later. No fever, but does have chills. Stomach pain and cramping. "Feels like everything is stuck right in there or in my throat". Vomiting off and on; when lying down feels okay. Standing up causes symptoms to come on. diarrhea also at this time, going off and on. Non bloody. Some things stay down. Sipping little amts of fluid. Sips of water.     Took some omprazole, sucralfate, and Phenergan. Also Zofran. Not too much help. Sinus drip in throat going on for a while.    Review of Systems    As per HPI    Objective:      Physical Exam   Constitutional: She appears ill (malaise).    woman whose Body mass index is 38.79 kg/m².    HENT:   Mouth/Throat: Oropharynx is clear and moist. No oropharyngeal exudate.   Neck: No thyromegaly present.   Cardiovascular: Normal rate, regular rhythm and normal heart sounds.   Pulmonary/Chest: Effort normal and breath sounds normal. No stridor. She has no wheezes. She has no rales.   Abdominal: Soft. Bowel sounds are normal. She exhibits no distension. There is tenderness (to light palpation in epigastrium).   Lymphadenopathy:     She has no cervical adenopathy.   Nursing note and vitals reviewed.      Vitals:    12/31/18 1503   BP: 106/80   BP Location: Right arm   Patient Position: Sitting   BP Method: Large (Manual)   Pulse: 82   Temp: 98.9 °F (37.2 °C)   SpO2: 96%   Weight: 102.5 kg (225 lb 15.5 oz)   Height: 5' 4" (1.626 m)     Body mass index is 38.79 kg/m².    RESULTS: Reviewed labs from last 9 months    Assessment:       1. Viral gastroenteritis        Plan:   Kellen was seen today for " nausea, emesis, diarrhea and fatigue.    Diagnoses and all orders for this visit:    Viral gastroenteritis:  New problem, however this has been a recurrent episode, most recently April 2018.  Last time last 12 days.  Unclear why she is having these recurring problems, refer to Infectious Disease for additional workup and recommendations.  For now continue symptomatic control with Phenergan, also start Bentyl for abdominal cramping. Tried giving 1 L IV fluids today as patient has very limited intake and is having diarrhea and vomiting, but unable to gain access; okay to discharge without fluids as pt has stable vitals.  -     0.9%  NaCl infusion  -     promethazine (PHENERGAN) 25 MG tablet; Take 1 tablet (25 mg total) by mouth every 6 (six) hours as needed for Nausea.  -     dicyclomine (BENTYL) 20 mg tablet; Take 1 tablet (20 mg total) by mouth every 6 (six) hours as needed (abdominal cramps).  -     Ambulatory consult to Infectious Disease    Follow-up in about 6 months (around 6/30/2019) for follow up visit.  Alireza Smith MD  Internal Medicine    Portions of this note were completed using medical dictation software. Please excuse typographical or syntax errors that were missed on review.

## 2019-01-07 ENCOUNTER — HOSPITAL ENCOUNTER (OUTPATIENT)
Dept: RADIOLOGY | Facility: HOSPITAL | Age: 44
Discharge: HOME | End: 2019-01-07
Attending: NURSE PRACTITIONER
Payer: OTHER GOVERNMENT

## 2019-01-07 ENCOUNTER — OFFICE VISIT (OUTPATIENT)
Dept: INTERNAL MEDICINE | Facility: CLINIC | Age: 44
End: 2019-01-07
Payer: OTHER GOVERNMENT

## 2019-01-07 VITALS
SYSTOLIC BLOOD PRESSURE: 110 MMHG | HEART RATE: 95 BPM | TEMPERATURE: 98 F | BODY MASS INDEX: 39.14 KG/M2 | DIASTOLIC BLOOD PRESSURE: 72 MMHG | WEIGHT: 220.88 LBS | HEIGHT: 63 IN | OXYGEN SATURATION: 98 %

## 2019-01-07 DIAGNOSIS — E86.0 DEHYDRATION: ICD-10-CM

## 2019-01-07 DIAGNOSIS — E87.6 HYPOKALEMIA: ICD-10-CM

## 2019-01-07 DIAGNOSIS — R11.2 NAUSEA VOMITING AND DIARRHEA: Primary | ICD-10-CM

## 2019-01-07 DIAGNOSIS — R19.7 NAUSEA VOMITING AND DIARRHEA: Primary | ICD-10-CM

## 2019-01-07 DIAGNOSIS — R19.7 NAUSEA VOMITING AND DIARRHEA: ICD-10-CM

## 2019-01-07 DIAGNOSIS — R11.2 NAUSEA VOMITING AND DIARRHEA: ICD-10-CM

## 2019-01-07 PROCEDURE — 99999 PR PBB SHADOW E&M-EST. PATIENT-LVL V: ICD-10-PCS | Mod: PBBFAC,,, | Performed by: NURSE PRACTITIONER

## 2019-01-07 PROCEDURE — 74019 RADEX ABDOMEN 2 VIEWS: CPT | Mod: 26,,, | Performed by: RADIOLOGY

## 2019-01-07 PROCEDURE — 99999 PR PBB SHADOW E&M-EST. PATIENT-LVL V: CPT | Mod: PBBFAC,,, | Performed by: NURSE PRACTITIONER

## 2019-01-07 PROCEDURE — 99215 OFFICE O/P EST HI 40 MIN: CPT | Mod: PBBFAC,25 | Performed by: NURSE PRACTITIONER

## 2019-01-07 PROCEDURE — 99214 PR OFFICE/OUTPT VISIT, EST, LEVL IV, 30-39 MIN: ICD-10-PCS | Mod: S$PBB,,, | Performed by: NURSE PRACTITIONER

## 2019-01-07 PROCEDURE — 99214 OFFICE O/P EST MOD 30 MIN: CPT | Mod: S$PBB,,, | Performed by: NURSE PRACTITIONER

## 2019-01-07 PROCEDURE — 74019 RADEX ABDOMEN 2 VIEWS: CPT | Mod: TC

## 2019-01-07 PROCEDURE — 74019 XR ABDOMEN FLAT AND ERECT: ICD-10-PCS | Mod: 26,,, | Performed by: RADIOLOGY

## 2019-01-07 RX ORDER — METRONIDAZOLE 500 MG/1
500 TABLET ORAL EVERY 8 HOURS
Qty: 21 TABLET | Refills: 0 | Status: SHIPPED | OUTPATIENT
Start: 2019-01-07 | End: 2019-08-14

## 2019-01-07 RX ORDER — ALUMINUM HYDROXIDE, MAGNESIUM HYDROXIDE, AND SIMETHICONE 2400; 240; 2400 MG/30ML; MG/30ML; MG/30ML
30 SUSPENSION ORAL EVERY 6 HOURS PRN
Status: SHIPPED | OUTPATIENT
Start: 2019-01-07

## 2019-01-07 RX ORDER — CIPROFLOXACIN 500 MG/1
500 TABLET ORAL EVERY 12 HOURS
Qty: 14 TABLET | Refills: 0 | Status: SHIPPED | OUTPATIENT
Start: 2019-01-07 | End: 2019-07-23

## 2019-01-07 RX ADMIN — ALUMINUM HYDROXIDE, MAGNESIUM HYDROXIDE, AND SIMETHICONE 30 ML: 2400; 240; 2400 SUSPENSION ORAL at 10:01

## 2019-01-07 NOTE — PROGRESS NOTES
Subjective:       Patient ID: Kellen Fraser is a 43 y.o. female.    Chief Complaint: Diarrhea; Nausea; and Emesis    Ms. Fraser presents today for nausea, vomiting, and diarrhea since 12/26/18. She saw her PCP 1 week ago and was diagnosed with viral gastroenteritis. She has not improved. She is having watery diarrhea constantly. She denies antibiotic use in past 3 months. She ate raw oysters ~48hrs prior to onset. She has been using bentyl for cramping and phenergan , alternated with zofran, for nausea. She is s/p cholecystectomy.     GI Problem   The primary symptoms include weight loss, fatigue, abdominal pain, nausea, vomiting and diarrhea. Primary symptoms do not include fever, melena, hematemesis, jaundice, hematochezia, dysuria, myalgias, arthralgias or rash. The illness began more than 7 days ago. The onset was sudden. The problem has not changed since onset.    Review of Systems   Constitutional: Positive for fatigue and weight loss. Negative for fever.   HENT: Negative for facial swelling.    Eyes: Negative for visual disturbance.   Respiratory: Negative for shortness of breath.    Cardiovascular: Negative for chest pain.   Gastrointestinal: Positive for abdominal pain, diarrhea, nausea and vomiting. Negative for hematemesis, hematochezia, jaundice and melena.   Genitourinary: Negative for dysuria.   Musculoskeletal: Negative for arthralgias and myalgias.   Skin: Negative for rash.   Psychiatric/Behavioral: Negative for confusion.       Objective:      Physical Exam   Constitutional: She is oriented to person, place, and time. She appears well-developed and well-nourished. No distress.   HENT:   Head: Normocephalic.   Eyes: No scleral icterus.   Cardiovascular: Normal rate, regular rhythm and normal heart sounds.   Pulmonary/Chest: Effort normal and breath sounds normal. No stridor. No respiratory distress. She has no wheezes. She has no rales.   Abdominal: Soft. Bowel sounds are normal. She exhibits no  distension and no mass. There is no tenderness. There is no rebound and no guarding.   Neurological: She is alert and oriented to person, place, and time.   Skin: Skin is warm and dry. She is not diaphoretic.   Psychiatric: She has a normal mood and affect. Her behavior is normal.   Nursing note and vitals reviewed.      Assessment:       1. Nausea vomiting and diarrhea    2. Dehydration        Plan:   1. Nausea vomiting and diarrhea  - CBC auto differential; Future  - Comprehensive metabolic panel; Future  - Stool Exam-Ova,Cysts,Parasites; Future  - Clostridium difficile EIA; Future  - Stool culture; Future  - aluminum & magnesium hydroxide-simethicone 400-400-40 mg/5 mL suspension 30 mL  - X-Ray Abdomen Flat And Erect; Future    2. Dehydration  - sodium chloride 0.9% bolus 1,000 mL  - POCT urine dipstick without microscope      Pt has been given instructions populated from [employer] database and has verbalized understanding of the after visit summary and information contained wherein.    Follow up with a primary care provider. May go to ER for acute shortness of breath, lightheadedness, fever, or any other emergent complaints or changes in condition.

## 2019-01-07 NOTE — PROGRESS NOTES
2 patient identifiers verified (Name and )    Patient instructed not to go to restroom or stand alone.  Patient verbalized understanding.    Symptoms: diarrhea    Number of attempts: 1    Size of cath used: 22G    Location: left AC    Fluid ordered: Normal Saline    Amount of fluid given: 1L      Initial Vitals:   BP: 110/72   Pulse: 95   Temp: 98.2

## 2019-01-07 NOTE — PATIENT INSTRUCTIONS
- Take a PROBIOTIC supplement daily    - Eat potassium rich foods, as you are able to tolerate. Apart from these, please keep to a BLAND diet (no fried, greasy, spicy food)  -Bananas, oranges, cantaloupe, honeydew, apricots, grapefruit (some dried fruits, such as prunes, raisins, and dates, are also high in potassium)  -Cooked spinach or broccoli  - Potatoes or Sweet potatoes  - Mushrooms, Peas, Cucumbers, Zucchini, Eggplant, Pumpkins, Leafy greens    Juice from potassium-rich fruit is also a good choice:  Orange juice  Tomato juice  Prune juice  Apricot juice  Grapefruit juice

## 2019-01-08 ENCOUNTER — PATIENT MESSAGE (OUTPATIENT)
Dept: INTERNAL MEDICINE | Facility: CLINIC | Age: 44
End: 2019-01-08

## 2019-01-10 ENCOUNTER — PATIENT MESSAGE (OUTPATIENT)
Dept: INTERNAL MEDICINE | Facility: CLINIC | Age: 44
End: 2019-01-10

## 2019-01-10 DIAGNOSIS — R11.2 NAUSEA VOMITING AND DIARRHEA: Primary | ICD-10-CM

## 2019-01-10 DIAGNOSIS — R19.7 NAUSEA VOMITING AND DIARRHEA: Primary | ICD-10-CM

## 2019-01-14 ENCOUNTER — PATIENT MESSAGE (OUTPATIENT)
Dept: INTERNAL MEDICINE | Facility: CLINIC | Age: 44
End: 2019-01-14

## 2019-02-04 RX ORDER — LEVOTHYROXINE SODIUM 175 UG/1
TABLET ORAL
Qty: 90 TABLET | Refills: 3 | Status: SHIPPED | OUTPATIENT
Start: 2019-02-04 | End: 2020-04-24 | Stop reason: SDUPTHER

## 2019-07-23 ENCOUNTER — HOSPITAL ENCOUNTER (OUTPATIENT)
Dept: RADIOLOGY | Facility: HOSPITAL | Age: 44
Discharge: HOME | End: 2019-07-23
Attending: INTERNAL MEDICINE
Payer: OTHER GOVERNMENT

## 2019-07-23 ENCOUNTER — OFFICE VISIT (OUTPATIENT)
Dept: INTERNAL MEDICINE | Facility: CLINIC | Age: 44
End: 2019-07-23
Payer: OTHER GOVERNMENT

## 2019-07-23 VITALS
HEIGHT: 64 IN | WEIGHT: 229 LBS | DIASTOLIC BLOOD PRESSURE: 86 MMHG | HEART RATE: 86 BPM | SYSTOLIC BLOOD PRESSURE: 116 MMHG | BODY MASS INDEX: 39.09 KG/M2

## 2019-07-23 DIAGNOSIS — M54.9 MID BACK PAIN: ICD-10-CM

## 2019-07-23 DIAGNOSIS — M50.20 HERNIATED CERVICAL DISC: Primary | ICD-10-CM

## 2019-07-23 DIAGNOSIS — Z00.00 HEALTH MAINTENANCE EXAMINATION: ICD-10-CM

## 2019-07-23 DIAGNOSIS — M50.20 HERNIATED CERVICAL DISC: ICD-10-CM

## 2019-07-23 PROCEDURE — 99999 PR PBB SHADOW E&M-EST. PATIENT-LVL IV: CPT | Mod: PBBFAC,,, | Performed by: INTERNAL MEDICINE

## 2019-07-23 PROCEDURE — 72070 XR THORACIC SPINE AP LATERAL: ICD-10-PCS | Mod: 26,,, | Performed by: RADIOLOGY

## 2019-07-23 PROCEDURE — 72040 XR CERVICAL SPINE AP LATERAL: ICD-10-PCS | Mod: 26,,, | Performed by: RADIOLOGY

## 2019-07-23 PROCEDURE — 99214 OFFICE O/P EST MOD 30 MIN: CPT | Mod: PBBFAC,25 | Performed by: INTERNAL MEDICINE

## 2019-07-23 PROCEDURE — 72070 X-RAY EXAM THORAC SPINE 2VWS: CPT | Mod: 26,,, | Performed by: RADIOLOGY

## 2019-07-23 PROCEDURE — 72040 X-RAY EXAM NECK SPINE 2-3 VW: CPT | Mod: TC

## 2019-07-23 PROCEDURE — 99214 OFFICE O/P EST MOD 30 MIN: CPT | Mod: S$PBB,,, | Performed by: INTERNAL MEDICINE

## 2019-07-23 PROCEDURE — 99214 PR OFFICE/OUTPT VISIT, EST, LEVL IV, 30-39 MIN: ICD-10-PCS | Mod: S$PBB,,, | Performed by: INTERNAL MEDICINE

## 2019-07-23 PROCEDURE — 72070 X-RAY EXAM THORAC SPINE 2VWS: CPT | Mod: TC

## 2019-07-23 PROCEDURE — 72040 X-RAY EXAM NECK SPINE 2-3 VW: CPT | Mod: 26,,, | Performed by: RADIOLOGY

## 2019-07-23 PROCEDURE — 99999 PR PBB SHADOW E&M-EST. PATIENT-LVL IV: ICD-10-PCS | Mod: PBBFAC,,, | Performed by: INTERNAL MEDICINE

## 2019-07-23 RX ORDER — MELOXICAM 15 MG/1
15 TABLET ORAL DAILY PRN
Qty: 90 TABLET | Refills: 3 | Status: SHIPPED | OUTPATIENT
Start: 2019-07-23 | End: 2020-05-27 | Stop reason: SDUPTHER

## 2019-07-23 RX ORDER — CYCLOBENZAPRINE HCL 5 MG
5-10 TABLET ORAL 3 TIMES DAILY PRN
Qty: 30 TABLET | Refills: 1 | Status: SHIPPED | OUTPATIENT
Start: 2019-07-23 | End: 2019-08-02

## 2019-07-23 RX ORDER — GABAPENTIN 300 MG/1
300 CAPSULE ORAL 3 TIMES DAILY PRN
Qty: 30 CAPSULE | Refills: 3 | Status: SHIPPED | OUTPATIENT
Start: 2019-07-23 | End: 2019-10-02 | Stop reason: SDUPTHER

## 2019-07-23 NOTE — LETTER
2019    Kellen Fraser  [address] [facility] [address]             [facility] - Internal Medicine  [address]  [facility] [address]  Phone: [phone]  Fax: [phone] To Whom It May Concern:     I am the Primary Care Physician for Ms Kellen Fraser,  1975. She has is undergoing an acute pain episode that has been evaluated in clinic today. I recommend continuing to stay off of work until next Monday,  for recovery. Please excuse her from work-related duties this week (including today and yesterday) for recovery.     If you have any questions or concerns, please don't hesitate to call.    Sincerely,         Alireza Smith MD

## 2019-07-23 NOTE — PATIENT INSTRUCTIONS
For the muscle pain, take Mobic once daily. Also take muscle relaxer 1-2 tabs up to three times a day for muscle pain. Please also use over-the-counter extra-strength Tylenol 500 mg, 1 tab every 6 hours as needed for pain. Please use ice, heat, or both as needed. Use over-the-counter creams or patches as needed as well.    For burning pain, please use gabapentin three times a day as needed. Major side effect is drowsiness.

## 2019-07-23 NOTE — PROGRESS NOTES
Subjective:       Patient ID: Kellen Fraser is a 44 y.o. female.    Chief Complaint: Back Pain; Neck Pain; and Arm Pain    HPI    Patient is accompanied by her son.    Last visit with me 12/2018.  Patient notes that on Sunday while standing at the stove cooking [place] she developed a sudden back pain. She felt like she had been punched in the back.  No trauma to the area in the preceding 24 hr.  It was hard to breathe, pain was severe for 10 min.  Pain gradually subsided but since Sunday has had radiation into the right shoulder and the arm of her pain, including numbness in the upper right arm and paresthesia in the 4th and 5th digits of the right hand.  She also has a burning pain in the back of the neck that is severe with sitting for a long time.  She reports a history of herniated disc in the neck.  She has been applying a heating pad and Mobic with only minimal relief.  She reports the back is tender.  She reports sharp pains in the upper back in the area of the spine, the pain has a waxing and waning quality but is always there.    Left arm is normal.  No palpitations, no fever or chills.  No chest tightness or chest pain.    Review of Systems   All other systems reviewed and are negative.      Objective:      Physical Exam   Constitutional: She is oriented to person, place, and time. No distress.    woman whose Body mass index is 39.31 kg/m².    Neck: No thyromegaly present.   Cardiovascular: Normal rate, regular rhythm and normal heart sounds.   Pulses:       Radial pulses are 2+ on the right side, and 2+ on the left side.   Pulmonary/Chest: Effort normal and breath sounds normal. No stridor. She has no wheezes.   Musculoskeletal:        Cervical back: She exhibits decreased range of motion and tenderness. She exhibits no bony tenderness, no swelling and no edema.        Thoracic back: She exhibits tenderness (upper thoracic spine). She exhibits no bony tenderness, no swelling and no edema.         "Right upper arm: She exhibits no tenderness, no bony tenderness and no swelling.        Right hand: Normal.   Lymphadenopathy:     She has no cervical adenopathy.   Neurological: She is alert and oriented to person, place, and time. She displays no tremor.   Skin: Skin is warm and dry. No rash noted.   Nursing note and vitals reviewed.      Vitals:    07/23/19 1110   BP: 116/86   BP Location: Right arm   Patient Position: Sitting   BP Method: Large (Manual)   Pulse: 86   Weight: 103.9 kg (229 lb)   Height: 5' 4" (1.626 m)     Body mass index is 39.31 kg/m².    RESULTS: Reviewed labs from last 12 months    Assessment:       1. Herniated cervical disc    2. Mid back pain    3. Health maintenance examination        Plan:   Kellen was seen today for back pain, neck pain and arm pain.    Diagnoses and all orders for this visit:    Herniated cervical disc:  New episode of a problem that has occurred in the past.  Pain consistent with muscle spasm and neuropathy from disc herniation.  Symptomatic relief as below.  Also use of over-the-counters as needed.  X-rays today, if pain does not subside or x-rays show severe abnormality refer to Orthopedics for evaluation.  -     cyclobenzaprine (FLEXERIL) 5 MG tablet; Take 1-2 tablets (5-10 mg total) by mouth 3 (three) times daily as needed for Muscle spasms.  -     meloxicam (MOBIC) 15 MG tablet; Take 1 tablet (15 mg total) by mouth daily as needed for Pain.  -     gabapentin (NEURONTIN) 300 MG capsule; Take 1 capsule (300 mg total) by mouth 3 (three) times daily as needed (burning pain).  -     X-Ray Cervical Spine AP And Lateral; Future    Mid back pain:  New problem, no trauma to the area, likely paraspinal muscle spasm, evaluate x-ray for any evidence of vertebral bone disease.  -     X-Ray Thoracic Spine AP Lateral; Future    Health maintenance examination  -     Comprehensive metabolic panel; Future  -     CBC Without Differential; Future  -     Lipid panel; Future  -     " TSH; Future    Follow up in about 6 months (around 1/23/2020) for EPP annual exam.  Alireza Smith MD  Internal Medicine    Portions of this note were completed using medical dictation software. Please excuse typographical or syntax errors that were missed on review.

## 2019-07-29 ENCOUNTER — PATIENT MESSAGE (OUTPATIENT)
Dept: INTERNAL MEDICINE | Facility: CLINIC | Age: 44
End: 2019-07-29

## 2019-07-29 DIAGNOSIS — M50.20 HERNIATED CERVICAL DISC: Primary | ICD-10-CM

## 2019-07-29 NOTE — TELEPHONE ENCOUNTER
Chart reviewed    Please see referral orders and please call patient to schedule a consult with [facility]. Thank you.

## 2019-08-01 ENCOUNTER — PATIENT MESSAGE (OUTPATIENT)
Dept: INTERNAL MEDICINE | Facility: CLINIC | Age: 44
End: 2019-08-01

## 2019-08-01 DIAGNOSIS — R20.2 PARESTHESIA OF SKIN: ICD-10-CM

## 2019-08-01 DIAGNOSIS — M54.12 CERVICAL RADICULOPATHY: Primary | ICD-10-CM

## 2019-08-01 NOTE — LETTER
August 1, 2019    Kellen Fraser  [address] [place] [address]             [facility]  [address]  [facility] [address]  Phone: [phone]  Fax: [phone] To Whom It May Concern:     I am the primary care provider for Ms Kellen Fraser, date of birth April 17, 1975.  She is being evaluated for symptoms that are exacerbated by her work conditions.  She has upcoming appointments for imaging and specialist referrals.  Please excuse her from work responsibilities until such time as she is cleared to return, expected to be sometime within the next 8 weeks.    If you have any questions or concerns, please don't hesitate to call.    Sincerely,         Alireza Smith MD

## 2019-08-06 ENCOUNTER — PATIENT MESSAGE (OUTPATIENT)
Dept: INTERNAL MEDICINE | Facility: CLINIC | Age: 44
End: 2019-08-06

## 2019-08-13 ENCOUNTER — PATIENT OUTREACH (OUTPATIENT)
Dept: ADMINISTRATIVE | Facility: OTHER | Age: 44
End: 2019-08-13

## 2019-08-13 DIAGNOSIS — Z12.31 ENCOUNTER FOR SCREENING MAMMOGRAM FOR MALIGNANT NEOPLASM OF BREAST: Primary | ICD-10-CM

## 2019-08-13 NOTE — PROGRESS NOTES
Subjective:      Patient ID: Kellen Fraser is a 44 y.o. female.    Chief Complaint: Neck Pain    Ms Fraser is a 43 yo female sent in consultation by Dr. Smith for evaluation of neck and right arm pain.  She has prior outside imaging from 2014.  She has had the pain since July 21.  She was standing and cooking and then she felt like she was punched and took her breath away.  Then the pain radiating up to her neck and she started to get burning in neck and tingling in fingers.  The tingling comes and goes.  The neck and upper back pain and then the right arm down the back to the wrist.  She has broken two glasses.  She had previous neck pain in 2014 and it was mor on the left side.  The pain got better.  She continues to have stabbing pain in mid back a couple of times a day and makes her catch her breath.  The pain is more with sitting and looking down.  She feels the best lying down or leaning back with head supported.  The pain is a burning in neck and the shoulder, and the arm pain is a deep ache and around shoulder blade.  She has been taking flexeril and gabapentin 3 times a day.  She has been taking mobic once a day.  She does not feel like the meds help.  She feels like the meds just make her sleepy.  She has been sleeping all day getting up to eat and take meds.  She has missed work for 3 weeks.  She has not been to chiropractor and PT in the past.  She has not had injections in the neck and no neck surgery.      MRI cervical 8/6/2019  The visualized portions of the posterior fossa is unremarkable.  The craniocervical junction is within normal limits.  No prevertebral soft tissue swelling is identified.    The cervical alignment is maintained.  The bone marrow signal is within normal limits.  The vertebral body heights are maintained.    The cord is normal in signal without evidence of edema or expansion.  There are no intraspinal collections.  There is no evidence of mass effect on the cord.    There is  multilevel disc desiccation.  Evaluation of the individual disc levels reveals the following:    C2-C3, there is a disc osteophyte complex along with facet hypertrophy and uncovertebral hypertrophy.  The spinal canal and neural foramina are unremarkable.    C3-C4, there is a disc osteophyte complex along with facet hypertrophy and uncovertebral hypertrophy.  There is superimposed central disc protrusion.  The spinal canal is within normal limits.  There is mild bilateral neural foraminal narrowing.    C4-C5, there is a disc osteophyte complex along with facet hypertrophy and uncovertebral hypertrophy.  There is superimposed central disc protrusion.  There is mild narrowing the spinal canal.  The neural foramina is unremarkable.    C5-C6, there is a disc osteophyte complex along with facet hypertrophy and uncovertebral hypertrophy.  There is superimposed central disc protrusion.  There is mild spinal canal narrowing.  There is mild bilateral neural foraminal narrowing.    C6-C7, there is a disc osteophyte complex along with facet hypertrophy and uncovertebral hypertrophy.  There is superimposed central disc protrusion.  There is mild narrowing of the spinal canal.  There is mild bilateral neural foraminal narrowing.    C7-T1, there is a disc osteophyte complex along with facet hypertrophy and uncovertebral hypertrophy.  The spinal canal and neural foramina are unremarkable.    The flow voids within the vertebral arteries are unremarkable.  The paraspinal soft tissues are within normal limits.    There is no evidence of abnormal enhancement following intravenous contrast administration.    Impression      Mild multilevel degenerative changes in the cervical spine most prominent at C5-C6 and C6-C7 levels.  No evidence of significant spinal canal or neural foraminal narrowing.    No evidence of abnormal enhancement.    X-ray cervical 7/23/2019  Mild DJD.  The C5/C6 disc space is narrowed.  The other disc spaces are well  maintained.  No fracture or dislocation.  No bone destruction identified.    X-ray thoracic 7/23/2019  Vertebral body heights are well maintained.  No evidence of fracture or dislocation.  Osseous structures demonstrate no destructive lesions.    Intervertebral disc spaces are mildly narrowed with minimal endplate sclerosis visualized throughout the thoracic spine.  No other significant degenerative changes.    Surgical clips in the right upper abdomen.    The visualized lung bases are unremarkable.    Impression      No fracture or dislocation.  Mild degenerative changes as above.    Past Medical History:  11/2014: BRCA negative  No date: Cervical disc disease      Comment:  C5-C6, impacting thecal sac  No date: Gallbladder sludge  No date: Hypothyroidism  No date: Thyroid cancer  No date: Uterine fibroid    Past Surgical History:  No date: BREAST BIOPSY; Right      Comment:  exc bx  2012: CHOLECYSTECTOMY  No date: FOOT SURGERY; Right  2004: THYROIDECTOMY    Review of patient's family history indicates:  Problem: No Known Problems      Relation: Mother          Age of Onset: (Not Specified)  Problem: Hypertension      Relation: Father          Age of Onset: (Not Specified)  Problem: Diabetes      Relation: Father          Age of Onset: (Not Specified)  Problem: Thyroid cancer      Relation: Sister          Age of Onset: (Not Specified)  Problem: Hodgkin's lymphoma      Relation: Sister          Age of Onset: (Not Specified)  Problem: Cancer      Relation: Sister          Age of Onset: (Not Specified)          Comment: Hodgkin's, thyroid  Problem: Leukemia      Relation: Brother          Age of Onset: (Not Specified)  Problem: Cancer      Relation: Brother          Age of Onset: (Not Specified)          Comment: Acute leukemia  Problem: Cancer      Relation: Maternal Grandmother          Age of Onset: (Not Specified)          Comment: Breast  Problem: Diabetes      Relation: Maternal Grandmother          Age of  Onset: (Not Specified)  Problem: Breast cancer      Relation: Maternal Grandmother          Age of Onset: 70  Problem: Heart disease      Relation: Maternal Grandfather          Age of Onset: (Not Specified)  Problem: Cancer      Relation: Paternal Grandfather          Age of Onset: (Not Specified)          Comment: Lymphoma  Problem: Ovarian cancer      Relation: Neg Hx          Age of Onset: (Not Specified)  Problem: Melanoma      Relation: Neg Hx          Age of Onset: (Not Specified)      Social History    Socioeconomic History      Marital status:       Spouse name: Not on file      Number of children: Not on file      Years of education: Not on file      Highest education level: Not on file    Occupational History      Not on file    Social Needs      Financial resource strain: Not on file      Food insecurity:        Worry: Not on file        Inability: Not on file      Transportation needs:        Medical: Not on file        Non-medical: Not on file    Tobacco Use      Smoking status: Never Smoker      Smokeless tobacco: Never Used    Substance and Sexual Activity      Alcohol use: Yes        Comment: socially      Drug use: No      Sexual activity: Yes    Lifestyle      Physical activity:        Days per week: Not on file        Minutes per session: Not on file      Stress: Not on file    Relationships      Social connections:        Talks on phone: Not on file        Gets together: Not on file        Attends Scientologist service: Not on file        Active member of club or organization: Not on file        Attends meetings of clubs or organizations: Not on file        Relationship status: Not on file    Other Topics      Concerns:        Are you pregnant or think you may be?: Not Asked        Breast-feeding: Not Asked    Social History Narrative      Not on file      Current Outpatient Medications:  gabapentin (NEURONTIN) 300 MG capsule, Take 1 capsule (300 mg total) by mouth 3 (three) times daily as  needed (burning pain)., Disp: 30 capsule, Rfl: 3  levothyroxine (SYNTHROID, LEVOTHROID) 175 MCG tablet, TAKE 1 TABLET BY MOUTH ONCE DAILY, Disp: 90 tablet, Rfl: 3  meloxicam (MOBIC) 15 MG tablet, Take 1 tablet (15 mg total) by mouth daily as needed for Pain., Disp: 90 tablet, Rfl: 3  methocarbamol (ROBAXIN) 500 MG Tab, Take 1 tablet (500 mg total) by mouth 4 (four) times daily., Disp: 120 tablet, Rfl: 2  methylPREDNISolone (MEDROL DOSEPACK) 4 mg tablet, use as directed, Disp: 1 Package, Rfl: 0    Current Facility-Administered Medications:  0.9%  NaCl infusion, , Intravenous, 1 time in Clinic/HOD, Alireza Smith MD  aluminum & magnesium hydroxide-simethicone 400-400-40 mg/5 mL suspension 30 mL, 30 mL, Oral, Q6H PRN, Aileen Vargas NP, 30 mL at 01/07/19 1049        Review of patient's allergies indicates:   -- Morphine -- Shortness Of Breath   -- Bactrim (sulfamethoxazole-trimethoprim) -- Nausea And Vomiting   -- Demerol (meperidine) -- Hives   -- Dilaudid (hydromorphone (bulk)) -- Hives   -- Erythromycin -- Hives   -- Influenza virus vaccines -- Hives   -- Penicillins -- Hives   -- Reglan (metoclopramide) -- Nausea And Vomiting   -- Toradol (ketorolac) -- Nausea And Vomiting        Review of Systems   Constitution: Negative for weight gain and weight loss.   Cardiovascular: Negative for chest pain.   Respiratory: Positive for shortness of breath.    Musculoskeletal: Positive for back pain (right upper back) and neck pain (right neck). Negative for joint pain and joint swelling.   Gastrointestinal: Negative for abdominal pain, bowel incontinence, nausea and vomiting.   Genitourinary: Negative for bladder incontinence.   Neurological: Positive for paresthesias (right hand comes and goes). Negative for numbness.         Objective:        General: Kellen is well-developed, well-nourished, appears stated age, in no acute distress, alert and oriented to time, place and person.     General    Vitals  reviewed.  Constitutional: She is oriented to person, place, and time. She appears well-developed and well-nourished.   HENT:   Head: Normocephalic and atraumatic.   Pulmonary/Chest: Effort normal.   Neurological: She is alert and oriented to person, place, and time.   Psychiatric: She has a normal mood and affect. Her behavior is normal. Judgment and thought content normal.     General Musculoskeletal Exam   Gait: normal     Right Ankle/Foot Exam     Tests   Heel Walk: able to perform  Tiptoe Walk: able to perform    Left Ankle/Foot Exam     Tests   Heel Walk: able to perform  Tiptoe Walk: able to perform  Back (L-Spine & T-Spine) / Neck (C-Spine) Exam     Tenderness   The patient is tender to palpation of the right trapezial. Right paramedian tenderness of the Upper C-Spine, Lower C-Spine and Upper T-Spine.     Neck (C-Spine) Range of Motion   Flexion:     30  Extension: 30Right Lateral Bend: 20 (with pain) Left Lateral Bend: 20 Right Rotation: 40 (with pain) Left Rotation: 40     Spinal Sensation   Right Side Sensation  C-Spine Level: normal   L-Spine Level: normal  S-Spine Level: normal  Left Side Sensation  C-Spine Level: normal  L-Spine Level: normal  S-Spine Level: normal    Back (L-Spine & T-Spine) Tests   Right Side Tests  Straight leg raise:      Sitting SLR: > 70 degrees      Left Side Tests  Straight leg raise:     Sitting SLR: > 70 degrees          Other She has no scoliosis .  Spinal Kyphosis:  Absent      Muscle Strength   Right Upper Extremity   Biceps: 4/5/5   Deltoid:  5/5  Triceps:  5/5  Wrist extension: 5/5/5   Finger Flexors:  5/5  Left Upper Extremity  Biceps: 5/5/5   Deltoid:  5/5  Triceps:  5/5  Wrist extension: 5/5/5   Finger Flexors:  5/5  Right Lower Extremity   Hip Flexion: 5/5   Quadriceps:  5/5   Anterior tibial:  5/5/5  EHL:  5/5  Left Lower Extremity   Hip Flexion: 5/5   Quadriceps:  5/5   Anterior tibial:  5/5/5   EHL:  5/5    Reflexes     Left Side  Biceps:  2+  Triceps:   2+  Brachioradialis:  2+  Quadriceps:  2+  Achilles:  2+  Left Clifton's Sign:  Absent  Babinski Sign:  absent    Right Side   Biceps:  2+  Triceps:  2+  Brachioradialis:  2+  Quadriceps:  2+  Achilles:  2+  Right Clifton's Sign:  absent  Babinski Sign:  absent    Vascular Exam     Right Pulses        Carotid:                  2+    Left Pulses        Carotid:                  2+              Assessment:       1. DDD (degenerative disc disease), cervical    2. Neck pain    3. Osteoarthritis of spine with radiculopathy, cervical region    4. Muscle spasm           Plan:       Orders Placed This Encounter    Procedure Order to [facility]    Ambulatory Referral to Physical/Occupational Therapy    methocarbamol (ROBAXIN) 500 MG Tab    methylPREDNISolone (MEDROL DOSEPACK) 4 mg tablet     More than 50% of the total time of 45 minutes was spent in counseling on diagnosis and treatment options. We discussed neck pain and the nature of neck pain.  We discussed that it is not one thing that causes the pain but an accumulation of multiple things that we do.  We discussed posture sitting and the importance of trying to sit better. We discussed that have to get head over spine. We discussed the benefits of therapy and exercise and continuing to move.  We discussed need to keep moving and not lie down all day.  The meds have her sleepy so all she is doing is sleeping.  We discussed stretching and trying to keep moving.    1.  C7-T1 intralaminar JONATHAN with pain management  2.  Medrol dose aubree and then mobic  3.  Robaxin 500mg po QID  4.  Physical therapy for cervical ROM, retractions, scapula stabilization and myofascial release and HEP [place]  5. I encourage her to get back to regular activity as soon as possible.  Discussed that person that took her out of work needs to release but moving is helpful and should go back  6.  RTC 8 weeks      Follow-up: Follow up in about 8 weeks (around 10/9/2019). If  there are any questions prior to this, the patient was instructed to contact the office.

## 2019-08-14 ENCOUNTER — OFFICE VISIT (OUTPATIENT)
Dept: SPINE | Facility: CLINIC | Age: 44
End: 2019-08-14
Attending: PHYSICAL MEDICINE & REHABILITATION
Payer: OTHER GOVERNMENT

## 2019-08-14 ENCOUNTER — PATIENT MESSAGE (OUTPATIENT)
Dept: SPINE | Facility: CLINIC | Age: 44
End: 2019-08-14

## 2019-08-14 ENCOUNTER — PATIENT MESSAGE (OUTPATIENT)
Dept: INTERNAL MEDICINE | Facility: CLINIC | Age: 44
End: 2019-08-14

## 2019-08-14 VITALS
TEMPERATURE: 98 F | WEIGHT: 230.63 LBS | HEIGHT: 64 IN | BODY MASS INDEX: 39.37 KG/M2 | DIASTOLIC BLOOD PRESSURE: 67 MMHG | HEART RATE: 84 BPM | SYSTOLIC BLOOD PRESSURE: 138 MMHG

## 2019-08-14 DIAGNOSIS — M62.838 MUSCLE SPASM: ICD-10-CM

## 2019-08-14 DIAGNOSIS — M54.2 NECK PAIN: ICD-10-CM

## 2019-08-14 DIAGNOSIS — M50.30 DDD (DEGENERATIVE DISC DISEASE), CERVICAL: Primary | ICD-10-CM

## 2019-08-14 DIAGNOSIS — M47.22 OSTEOARTHRITIS OF SPINE WITH RADICULOPATHY, CERVICAL REGION: ICD-10-CM

## 2019-08-14 PROCEDURE — 99204 OFFICE O/P NEW MOD 45 MIN: CPT | Mod: S$PBB,,, | Performed by: PHYSICAL MEDICINE & REHABILITATION

## 2019-08-14 PROCEDURE — 99999 PR PBB SHADOW E&M-EST. PATIENT-LVL IV: CPT | Mod: PBBFAC,,, | Performed by: PHYSICAL MEDICINE & REHABILITATION

## 2019-08-14 PROCEDURE — 99204 PR OFFICE/OUTPT VISIT, NEW, LEVL IV, 45-59 MIN: ICD-10-PCS | Mod: S$PBB,,, | Performed by: PHYSICAL MEDICINE & REHABILITATION

## 2019-08-14 PROCEDURE — 99214 OFFICE O/P EST MOD 30 MIN: CPT | Mod: PBBFAC | Performed by: PHYSICAL MEDICINE & REHABILITATION

## 2019-08-14 PROCEDURE — 99999 PR PBB SHADOW E&M-EST. PATIENT-LVL IV: ICD-10-PCS | Mod: PBBFAC,,, | Performed by: PHYSICAL MEDICINE & REHABILITATION

## 2019-08-14 RX ORDER — CYCLOBENZAPRINE HCL 5 MG
TABLET ORAL
COMMUNITY
Start: 2019-08-04 | End: 2019-08-14

## 2019-08-14 RX ORDER — METHOCARBAMOL 500 MG/1
500 TABLET, FILM COATED ORAL 4 TIMES DAILY
Qty: 120 TABLET | Refills: 2 | Status: SHIPPED | OUTPATIENT
Start: 2019-08-14 | End: 2020-09-28 | Stop reason: SDUPTHER

## 2019-08-14 RX ORDER — METHYLPREDNISOLONE 4 MG/1
TABLET ORAL
Qty: 1 PACKAGE | Refills: 0 | Status: SHIPPED | OUTPATIENT
Start: 2019-08-14 | End: 2019-09-04

## 2019-08-14 NOTE — LETTER
August 14, 2019      Alireza Smith MD  [address]  [facility] [address]           [address]  [address], [address]  [facility] [address]  Phone: [phone]  Fax: [phone]          Patient: Kellen Fraser   MR Number: [id number]   YOB: 1975   Date of Visit: 8/14/2019       Dear Dr. Alireza Smith:    Thank you for referring Kellen Fraser to me for evaluation. Attached you will find relevant portions of my assessment and plan of care.    If you have questions, please do not hesitate to call me. I look forward to following Kellen Fraser along with you.    Sincerely,    Codie Benjamin MD    Enclosure  CC:  No Recipients    If you would like to receive this communication electronically, please contact [email or web address] or [phone] to request more information on [employer] Link access.    For providers and/or their staff who would like to refer a patient to [facility], please contact us through our one-stop-shop provider referral line, [facility], at [phone].    If you feel you have received this communication in error or would no longer like to receive these types of communications, please e-mail [email or web address]

## 2019-08-15 ENCOUNTER — TELEPHONE (OUTPATIENT)
Dept: PAIN MEDICINE | Facility: CLINIC | Age: 44
End: 2019-08-15

## 2019-08-15 ENCOUNTER — PATIENT MESSAGE (OUTPATIENT)
Dept: SPINE | Facility: CLINIC | Age: 44
End: 2019-08-15

## 2019-08-15 DIAGNOSIS — M54.2 NECK PAIN: Primary | ICD-10-CM

## 2019-08-16 ENCOUNTER — PATIENT MESSAGE (OUTPATIENT)
Dept: INTERNAL MEDICINE | Facility: CLINIC | Age: 44
End: 2019-08-16

## 2019-08-19 ENCOUNTER — PATIENT MESSAGE (OUTPATIENT)
Dept: INTERNAL MEDICINE | Facility: CLINIC | Age: 44
End: 2019-08-19

## 2019-08-19 NOTE — LETTER
August 19, 2019    Kellen Fraser  [address] [facility] [address]             [facility]  [address]  [place] [address]  Phone: [phone]  Fax: [phone] To Whom It May Concern:     I am the Primary Care Physician for Ms. Kellen Fraser, date of birth 1975. She has been appropriately evaluated and is clear to return to normal work duties. Please allow her to return to work at this time.      If you have any questions or concerns, please don't hesitate to call.    Sincerely,         Alireza Smith MD

## 2019-09-10 ENCOUNTER — HOSPITAL ENCOUNTER (OUTPATIENT)
Facility: OTHER | Age: 44
Discharge: HOME | End: 2019-09-10
Attending: ANESTHESIOLOGY | Admitting: ANESTHESIOLOGY
Payer: OTHER GOVERNMENT

## 2019-09-10 VITALS
SYSTOLIC BLOOD PRESSURE: 118 MMHG | DIASTOLIC BLOOD PRESSURE: 55 MMHG | RESPIRATION RATE: 18 BRPM | OXYGEN SATURATION: 100 % | HEART RATE: 61 BPM

## 2019-09-10 DIAGNOSIS — G89.29 CHRONIC PAIN: ICD-10-CM

## 2019-09-10 DIAGNOSIS — M54.12 CERVICAL RADICULOPATHY: ICD-10-CM

## 2019-09-10 DIAGNOSIS — M50.30 DDD (DEGENERATIVE DISC DISEASE), CERVICAL: Primary | ICD-10-CM

## 2019-09-10 PROCEDURE — 62321 NJX INTERLAMINAR CRV/THRC: CPT | Performed by: ANESTHESIOLOGY

## 2019-09-10 PROCEDURE — 99152 PR MOD CONSCIOUS SEDATION, SAME PHYS, 5+ YRS, FIRST 15 MIN: ICD-10-PCS | Mod: ,,, | Performed by: ANESTHESIOLOGY

## 2019-09-10 PROCEDURE — 62321 NJX INTERLAMINAR CRV/THRC: CPT | Mod: ,,, | Performed by: ANESTHESIOLOGY

## 2019-09-10 PROCEDURE — 99152 MOD SED SAME PHYS/QHP 5/>YRS: CPT | Mod: ,,, | Performed by: ANESTHESIOLOGY

## 2019-09-10 PROCEDURE — 63600175 PHARM REV CODE 636 W HCPCS: Performed by: ANESTHESIOLOGY

## 2019-09-10 PROCEDURE — 62321 PR INJ CERV/THORAC, W/GUIDANCE: ICD-10-PCS | Mod: ,,, | Performed by: ANESTHESIOLOGY

## 2019-09-10 PROCEDURE — 63600175 PHARM REV CODE 636 W HCPCS: Performed by: STUDENT IN AN ORGANIZED HEALTH CARE EDUCATION/TRAINING PROGRAM

## 2019-09-10 RX ORDER — MIDAZOLAM HYDROCHLORIDE 1 MG/ML
INJECTION INTRAMUSCULAR; INTRAVENOUS
Status: DISCONTINUED | OUTPATIENT
Start: 2019-09-10 | End: 2019-09-10 | Stop reason: HOSPADM

## 2019-09-10 RX ORDER — FENTANYL CITRATE 50 UG/ML
INJECTION, SOLUTION INTRAMUSCULAR; INTRAVENOUS
Status: DISCONTINUED | OUTPATIENT
Start: 2019-09-10 | End: 2019-09-10 | Stop reason: HOSPADM

## 2019-09-10 RX ORDER — SODIUM CHLORIDE 9 MG/ML
500 INJECTION, SOLUTION INTRAVENOUS CONTINUOUS
Status: DISCONTINUED | OUTPATIENT
Start: 2019-09-10 | End: 2019-09-10 | Stop reason: HOSPADM

## 2019-09-10 RX ADMIN — SODIUM CHLORIDE 500 ML: 0.9 INJECTION, SOLUTION INTRAVENOUS at 09:09

## 2019-09-10 NOTE — OP NOTE
Cervical Interlaminar Epidural Steroid Injection under Fluoroscopic Guidance.   Time-out taken to identify patient and procedure prior to starting the procedure.     Date of Procedure: 09/10/2019    PROCEDURE: Cervical Interlaminar epidural steroid injection C7/T1 under fluoroscopic guidance.     Pre-Op diagnosis: Neck pain [M54.2]    Post-Op diagnosis: Neck pain [M54.2]    PHYSICIAN: JOSUE MCMAHON     ASSISTANTS: None     MEDICATIONS INJECTED: Preservative-free Decadron 10 mg with 1mL of sterile 0.25%Bupivicaine and 3ml of preservative free normal saline.     LOCAL ANESTHETIC INJECTED: Xylocaine 1% 3mL    ESTIMATED BLOOD LOSS: none.     COMPLICATIONS: none.     TECHNIQUE: With the patient laying in a prone position, the area was prepped and draped in the usual sterile fashion using ChloraPrep and a fenestrated drape. Local anesthetic was given using a 27-gauge needle by raising a wheal and going down to the hub of the needle over the C7/T1 interlaminar space.  The interlaminar space was then approached with a 3.5 inch 18-gauge Touhy needle was introduced under fluoroscopic guidance in the AP and Lateral view. Once the Ligamentum flavum was encountered loss of resistance to saline was used to enter the epidural space. With positive loss of resistance and negative CSF or Blood, 2mL contrast dye Omnipaque (300mg/ml) was injected to confirm placement and there was no vascular runoff. The medication was then injected slowly. The patient tolerated the procedure well.     Conscious sedation provided by M.D    The patient was monitored with continuous pulse oximetry, EKG, and intermittent blood pressure monitors.  The patient was hemodynamically stable throughout the entire process was responsive to voice, and breathing spontaneously.  Supplemental O2 was provided at 2L/min via nasal cannula.  Patient was comfortable for the duration of the procedure. (See nurse documentation and case log for sedation time)    There was  a total of 2mg IV Midazolam and 50 mcg Fentanyl was titrated for the procedure        The patient was monitored after the procedure.   They were given post-procedure and discharge instructions to follow at home.  The patient was discharged in a stable condition.

## 2019-09-10 NOTE — DISCHARGE INSTRUCTIONS
Thank you for allowing us to care for you today. You may receive a survey about the care we provided. Your feedback is valuable and helps us provide excellent care throughout the community.     Home Care Instructions for Pain Management:    1. DIET:   You may resume your normal diet today.   2. BATHING:   You may shower with luke warm water. No tub baths or anything that will soak injection sites under water for the next 24 hours.  3. DRESSING:   You may remove your bandage today.   4. ACTIVITY LEVEL:   You may resume your normal activities 24 hrs after your procedure. Nothing strenuous today.  5. MEDICATIONS:   You may resume your normal medications today. To restart blood thinners, ask your doctor.  6. DRIVING    If you have received any sedatives by mouth today, you may not drive for 12 hours.    If you have received any sedation through your IV, you may not drive for 24 hrs.   7. SPECIAL INSTRUCTIONS:   No heat to the injection site for 24 hrs including, hot bath or shower, heating pad, moist heat, or hot tubs.    Use ice pack to injection site for any pain or discomfort.  Apply ice packs for 20 minute intervals as needed.    IF you have diabetes, be sure to monitor your blood sugar more closely. IF your injection contained steroids your blood sugar levels may become higher than normal.    If you are still having pain upon discharge:  Your pain may improve over the next 48 hours. The anesthetic (numbing medication) works immediately to 48 hours. IF your injection contained a steroid (anti-inflammatory medication), it takes approximately 3 days to start feeling relief and 7-10 days to see your greatest results from the medication. It is possible you may need subsequent injections. This would be discussed at your follow up appointment with pain management or your referring doctor.    Please call the PAIN MANAGEMENT office at [phone] or ON CALL pager at [phone] if you experienced any:   -Weakness or  loss of sensation  -Fever > 101.5  -Pain uncontrolled with oral medications   -Persistent nausea, vomiting, or diarrhea  -Redness or drainage from the injection sites, or any other worrisome concerns.   If physician on call was not reached or could not communicate with our office for any reason please go to the nearest emergency department.  Adult Procedural Sedation Instructions    Recovery After Procedural Sedation (Adult)  You have been given medicine by vein to make you sleep during your surgery. This may have included both a pain medicine and sleeping medicine. Most of the effects have worn off. But you may still have some drowsiness for the next 6 to 8 hours.  Home care  Follow these guidelines when you get home:  · For the next 8 hours, you should be watched by a responsible adult. This person should make sure your condition is not getting worse.  · Don't drink any alcohol for the next 24 hours.  · Don't drive, operate dangerous machinery, or make important business or personal decisions during the next 24 hours.  Note: Your healthcare provider may tell you not to take any medicine by mouth for pain or sleep in the next 4 hours. These medicines may react with the medicines you were given in the hospital. This could cause a much stronger response than usual.  Follow-up care  Follow up with your healthcare provider if you are not alert and back to your usual level of activity within 12 hours.  When to seek medical advice  Call your healthcare provider right away if any of these occur:  · Drowsiness gets worse  · Weakness or dizziness gets worse  · Repeated vomiting  · You can't be awakened   Date Last Reviewed: 10/18/2016  © 8206-0465 [employer]. [address] [place] [address], [place] [address]. All rights reserved. This information is not intended as a substitute for professional medical care. Always follow your healthcare professional's instructions.

## 2019-09-10 NOTE — H&P
HPI  Patient presenting for Procedure(s) (LRB):  Injection, Steroid, Epidural CERVICAL/THORACIC C7-T1 IL JONATHAN (N/A)     Patient on Anti-coagulation No    No health changes since previous encounter    Past Medical History:   Diagnosis Date    BRCA negative 11/2014    Cervical disc disease     C5-C6, impacting thecal sac    Gallbladder sludge     Hypothyroidism     Thyroid cancer     Uterine fibroid      Past Surgical History:   Procedure Laterality Date    BREAST BIOPSY Right     exc bx    CHOLECYSTECTOMY  2012    FOOT SURGERY Right     THYROIDECTOMY  2004     Review of patient's allergies indicates:   Allergen Reactions    Morphine Shortness Of Breath    Bactrim [sulfamethoxazole-trimethoprim] Nausea And Vomiting    Demerol [meperidine] Hives    Dilaudid [hydromorphone (bulk)] Hives    Erythromycin Hives    Influenza virus vaccines Hives    Penicillins Hives    Reglan [metoclopramide] Nausea And Vomiting    Toradol [ketorolac] Nausea And Vomiting      Current Facility-Administered Medications   Medication    0.9%  NaCl infusion       PMHx, PSHx, Allergies, Medications reviewed in epic    ROS negative except pain complaints in HPI    OBJECTIVE:    Breastfeeding? No     PHYSICAL EXAMINATION:    GENERAL: Well appearing, in no acute distress, alert and oriented x3.  PSYCH:  Mood and affect appropriate.  SKIN: Skin color, texture, turgor normal, no rashes or lesions which will impact the procedure.  CV: RRR with palpation of the radial artery.  PULM: No evidence of respiratory difficulty, symmetric chest rise. Clear to auscultation.  NEURO: Cranial nerves grossly intact.    Plan:    Proceed with procedure as planned Procedure(s) (LRB):  Injection, Steroid, Epidural CERVICAL/THORACIC C7-T1 IL JONATHAN (N/A)    Brandan Russo MD  09/10/2019             risk factors

## 2019-09-10 NOTE — DISCHARGE SUMMARY
Discharge Note  Short Stay      SUMMARY     Admit Date: 9/10/2019    Attending Physician: Brandan Russo      Discharge Physician: Brandan Russo      Discharge Date: 9/10/2019 9:28 AM    Procedure(s) (LRB):  Injection, Steroid, Epidural CERVICAL/THORACIC C7-T1 IL JONATHAN (N/A)    Final Diagnosis: Neck pain [M54.2]    Disposition: Home or self care    Patient Instructions:   Current Discharge Medication List      CONTINUE these medications which have NOT CHANGED    Details   gabapentin (NEURONTIN) 300 MG capsule Take 1 capsule (300 mg total) by mouth 3 (three) times daily as needed (burning pain).  Qty: 30 capsule, Refills: 3    Associated Diagnoses: Herniated cervical disc      levothyroxine (SYNTHROID, LEVOTHROID) 175 MCG tablet TAKE 1 TABLET BY MOUTH ONCE DAILY  Qty: 90 tablet, Refills: 3      meloxicam (MOBIC) 15 MG tablet Take 1 tablet (15 mg total) by mouth daily as needed for Pain.  Qty: 90 tablet, Refills: 3    Associated Diagnoses: Herniated cervical disc      methocarbamol (ROBAXIN) 500 MG Tab Take 1 tablet (500 mg total) by mouth 4 (four) times daily.  Qty: 120 tablet, Refills: 2                 Discharge Diagnosis: Neck pain [M54.2]  Condition on Discharge: Stable with no complications to procedure   Diet on Discharge: Same as before.  Activity: as per instruction sheet.  Discharge to: Home with a responsible adult.  Follow up: 2-4 weeks       Please call my office or pager at [phone] if experienced any weakness or loss of sensation, fever > 101.5, pain uncontrolled with oral medications, persistent nausea/vomiting/or diarrhea, redness or drainage from the incisions, or any other worrisome concerns. If physician on call was not reached or could not communicate with our office for any reason please go to the nearest emergency department

## 2019-09-11 ENCOUNTER — NURSE TRIAGE (OUTPATIENT)
Dept: ADMINISTRATIVE | Facility: CLINIC | Age: 44
End: 2019-09-11

## 2019-09-11 ENCOUNTER — TELEPHONE (OUTPATIENT)
Dept: PAIN MEDICINE | Facility: CLINIC | Age: 44
End: 2019-09-11

## 2019-09-11 NOTE — TELEPHONE ENCOUNTER
Spoke to patient regarding her message, she reports she woke up this morning, just straight vomiting, she is experiencing severe nausea, she is currently at the ER for the symptoms..     She was informed that it could have been from the sedation and not having eaten anything prior.     She was notified that Dr. Cody and Dr. Benjamin will be notified of her symptoms.

## 2019-09-11 NOTE — TELEPHONE ENCOUNTER
----- Message from Stevie Linares, Patient Care Assistant sent at 9/11/2019 10:10 AM CDT -----  Contact: TANIA MASTERSON [[id number]]  Name of Who is Calling:  TANIA MASTERSON [[id number]]    What is the request in detail:Requesting a call back in regards to receiving injection yesterday and vomiting  today. Please contact to further discuss and advise      Can the clinic reply by [facility]: Yes    What Number to Call Back if not in [facility]:    [phone]

## 2019-09-11 NOTE — TELEPHONE ENCOUNTER
Reason for Disposition   SEVERE vomiting (e.g., 6 or more times/day)    Additional Information   Negative: Shock suspected (e.g., cold/pale/clammy skin, too weak to stand, low BP, rapid pulse)   Negative: Difficult to awaken or acting confused (e.g., disoriented, slurred speech)   Negative: Sounds like a life-threatening emergency to the triager    Protocols used: VOMITING-A-OH    Pt called stated she woke up this am about 5:30 and had non stop vomiting. Pt said she vomited about 7 times. She was able to fall asleep but woke up again and is extremely nauseated. Care advice recommended pt go to er. Pt stated she has to find a ride. Please call pt with additional care advice if needed.

## 2019-10-01 ENCOUNTER — PATIENT OUTREACH (OUTPATIENT)
Dept: ADMINISTRATIVE | Facility: OTHER | Age: 44
End: 2019-10-01

## 2019-10-02 ENCOUNTER — OFFICE VISIT (OUTPATIENT)
Dept: SPINE | Facility: CLINIC | Age: 44
End: 2019-10-02
Attending: PHYSICAL MEDICINE & REHABILITATION
Payer: OTHER GOVERNMENT

## 2019-10-02 VITALS
SYSTOLIC BLOOD PRESSURE: 133 MMHG | HEIGHT: 64 IN | HEART RATE: 73 BPM | BODY MASS INDEX: 40.27 KG/M2 | DIASTOLIC BLOOD PRESSURE: 71 MMHG | TEMPERATURE: 98 F | WEIGHT: 235.88 LBS

## 2019-10-02 DIAGNOSIS — M50.20 HERNIATED CERVICAL DISC: ICD-10-CM

## 2019-10-02 DIAGNOSIS — M54.2 NECK PAIN: ICD-10-CM

## 2019-10-02 DIAGNOSIS — M50.30 DDD (DEGENERATIVE DISC DISEASE), CERVICAL: Primary | ICD-10-CM

## 2019-10-02 DIAGNOSIS — M47.22 OSTEOARTHRITIS OF SPINE WITH RADICULOPATHY, CERVICAL REGION: ICD-10-CM

## 2019-10-02 DIAGNOSIS — M62.838 MUSCLE SPASM: ICD-10-CM

## 2019-10-02 PROCEDURE — 99214 PR OFFICE/OUTPT VISIT, EST, LEVL IV, 30-39 MIN: ICD-10-PCS | Mod: S$PBB,,, | Performed by: PHYSICAL MEDICINE & REHABILITATION

## 2019-10-02 PROCEDURE — 99213 OFFICE O/P EST LOW 20 MIN: CPT | Mod: PBBFAC | Performed by: PHYSICAL MEDICINE & REHABILITATION

## 2019-10-02 PROCEDURE — 99999 PR PBB SHADOW E&M-EST. PATIENT-LVL III: CPT | Mod: PBBFAC,,, | Performed by: PHYSICAL MEDICINE & REHABILITATION

## 2019-10-02 PROCEDURE — 99999 PR PBB SHADOW E&M-EST. PATIENT-LVL III: ICD-10-PCS | Mod: PBBFAC,,, | Performed by: PHYSICAL MEDICINE & REHABILITATION

## 2019-10-02 PROCEDURE — 99214 OFFICE O/P EST MOD 30 MIN: CPT | Mod: S$PBB,,, | Performed by: PHYSICAL MEDICINE & REHABILITATION

## 2019-10-02 RX ORDER — GABAPENTIN 300 MG/1
300 CAPSULE ORAL 3 TIMES DAILY PRN
Qty: 90 CAPSULE | Refills: 3 | Status: SHIPPED | OUTPATIENT
Start: 2019-10-02 | End: 2020-11-06

## 2019-10-02 NOTE — PROGRESS NOTES
Subjective:      Patient ID: Kellen Fraser is a 44 y.o. female.    Chief Complaint: Neck Pain    Ms Fraser is a 45 yo female here for follow up of neck and right arm pain.  She was last seen by me on 8/14/2019 and had the pain since July 21.  She was standing and cooking and then she felt like she was punched and took her breath away.  She was sent to PT and for cervical JONATHAN done 9/10/2019.  She feels like the injection helped the neck pain.  She feels like she still has spasms and she feels like the right arm is a dull ache.  She feels like it is weaker.  The medicine does help the arm pain.  She is taking the mobic, robaxin twice a day, the gabapentin 2 times.  She was taking it more but does not need it as often.  She did have another episode where she felt a sudden pressure in pain in the back and caused her to vomit.  That was prior to injection.  She feels like the pain varies.  She had an ergonomic assessment at work and that helped. She did make to PT.  She has not been in a month.  She does not have the numbness and tingling    MRI cervical 8/6/2019  The visualized portions of the posterior fossa is unremarkable.  The craniocervical junction is within normal limits.  No prevertebral soft tissue swelling is identified.    The cervical alignment is maintained.  The bone marrow signal is within normal limits.  The vertebral body heights are maintained.    The cord is normal in signal without evidence of edema or expansion.  There are no intraspinal collections.  There is no evidence of mass effect on the cord.    There is multilevel disc desiccation.  Evaluation of the individual disc levels reveals the following:    C2-C3, there is a disc osteophyte complex along with facet hypertrophy and uncovertebral hypertrophy.  The spinal canal and neural foramina are unremarkable.    C3-C4, there is a disc osteophyte complex along with facet hypertrophy and uncovertebral hypertrophy.  There is superimposed central disc  protrusion.  The spinal canal is within normal limits.  There is mild bilateral neural foraminal narrowing.    C4-C5, there is a disc osteophyte complex along with facet hypertrophy and uncovertebral hypertrophy.  There is superimposed central disc protrusion.  There is mild narrowing the spinal canal.  The neural foramina is unremarkable.    C5-C6, there is a disc osteophyte complex along with facet hypertrophy and uncovertebral hypertrophy.  There is superimposed central disc protrusion.  There is mild spinal canal narrowing.  There is mild bilateral neural foraminal narrowing.    C6-C7, there is a disc osteophyte complex along with facet hypertrophy and uncovertebral hypertrophy.  There is superimposed central disc protrusion.  There is mild narrowing of the spinal canal.  There is mild bilateral neural foraminal narrowing.    C7-T1, there is a disc osteophyte complex along with facet hypertrophy and uncovertebral hypertrophy.  The spinal canal and neural foramina are unremarkable.    The flow voids within the vertebral arteries are unremarkable.  The paraspinal soft tissues are within normal limits.    There is no evidence of abnormal enhancement following intravenous contrast administration.    Impression      Mild multilevel degenerative changes in the cervical spine most prominent at C5-C6 and C6-C7 levels.  No evidence of significant spinal canal or neural foraminal narrowing.    No evidence of abnormal enhancement.    X-ray cervical 7/23/2019  Mild DJD.  The C5/C6 disc space is narrowed.  The other disc spaces are well maintained.  No fracture or dislocation.  No bone destruction identified.    X-ray thoracic 7/23/2019  Vertebral body heights are well maintained.  No evidence of fracture or dislocation.  Osseous structures demonstrate no destructive lesions.    Intervertebral disc spaces are mildly narrowed with minimal endplate sclerosis visualized throughout the thoracic spine.  No other significant  degenerative changes.    Surgical clips in the right upper abdomen.    The visualized lung bases are unremarkable.    Impression      No fracture or dislocation.  Mild degenerative changes as above.    Past Medical History:  11/2014: BRCA negative  No date: Cervical disc disease      Comment:  C5-C6, impacting thecal sac  No date: Gallbladder sludge  No date: Hypothyroidism  No date: Thyroid cancer  No date: Uterine fibroid    Past Surgical History:  No date: BREAST BIOPSY; Right      Comment:  exc bx  2012: CHOLECYSTECTOMY  No date: FOOT SURGERY; Right  2004: THYROIDECTOMY    Review of patient's family history indicates:  Problem: No Known Problems      Relation: Mother          Age of Onset: (Not Specified)  Problem: Hypertension      Relation: Father          Age of Onset: (Not Specified)  Problem: Diabetes      Relation: Father          Age of Onset: (Not Specified)  Problem: Thyroid cancer      Relation: Sister          Age of Onset: (Not Specified)  Problem: Hodgkin's lymphoma      Relation: Sister          Age of Onset: (Not Specified)  Problem: Cancer      Relation: Sister          Age of Onset: (Not Specified)          Comment: Hodgkin's, thyroid  Problem: Leukemia      Relation: Brother          Age of Onset: (Not Specified)  Problem: Cancer      Relation: Brother          Age of Onset: (Not Specified)          Comment: Acute leukemia  Problem: Cancer      Relation: Maternal Grandmother          Age of Onset: (Not Specified)          Comment: Breast  Problem: Diabetes      Relation: Maternal Grandmother          Age of Onset: (Not Specified)  Problem: Breast cancer      Relation: Maternal Grandmother          Age of Onset: 70  Problem: Heart disease      Relation: Maternal Grandfather          Age of Onset: (Not Specified)  Problem: Cancer      Relation: Paternal Grandfather          Age of Onset: (Not Specified)          Comment: Lymphoma  Problem: Ovarian cancer      Relation: Neg Hx          Age of  Onset: (Not Specified)  Problem: Melanoma      Relation: Neg Hx          Age of Onset: (Not Specified)      Social History    Socioeconomic History      Marital status:       Spouse name: Not on file      Number of children: Not on file      Years of education: Not on file      Highest education level: Not on file    Occupational History      Not on file    Social Needs      Financial resource strain: Not on file      Food insecurity:        Worry: Not on file        Inability: Not on file      Transportation needs:        Medical: Not on file        Non-medical: Not on file    Tobacco Use      Smoking status: Never Smoker      Smokeless tobacco: Never Used    Substance and Sexual Activity      Alcohol use: Yes        Comment: socially      Drug use: No      Sexual activity: Yes    Lifestyle      Physical activity:        Days per week: Not on file        Minutes per session: Not on file      Stress: Not on file    Relationships      Social connections:        Talks on phone: Not on file        Gets together: Not on file        Attends Christianity service: Not on file        Active member of club or organization: Not on file        Attends meetings of clubs or organizations: Not on file        Relationship status: Not on file    Other Topics      Concerns:        Are you pregnant or think you may be?: Not Asked        Breast-feeding: Not Asked    Social History Narrative      Not on file      Current Outpatient Medications:  gabapentin (NEURONTIN) 300 MG capsule, Take 1 capsule (300 mg total) by mouth 3 (three) times daily as needed (burning pain)., Disp: 30 capsule, Rfl: 3  levothyroxine (SYNTHROID, LEVOTHROID) 175 MCG tablet, TAKE 1 TABLET BY MOUTH ONCE DAILY, Disp: 90 tablet, Rfl: 3  meloxicam (MOBIC) 15 MG tablet, Take 1 tablet (15 mg total) by mouth daily as needed for Pain., Disp: 90 tablet, Rfl: 3  methocarbamol (ROBAXIN) 500 MG Tab, Take 1 tablet (500 mg total) by mouth 4 (four) times daily., Disp: 120  tablet, Rfl: 2  methylPREDNISolone (MEDROL DOSEPACK) 4 mg tablet, use as directed, Disp: 1 Package, Rfl: 0    Current Facility-Administered Medications:  0.9%  NaCl infusion, , Intravenous, 1 time in Clinic/HOD, Alireza Smith MD  aluminum & magnesium hydroxide-simethicone 400-400-40 mg/5 mL suspension 30 mL, 30 mL, Oral, Q6H PRN, Aileen Vargas, ELEANOR, 30 mL at 01/07/19 1049        Review of patient's allergies indicates:   -- Morphine -- Shortness Of Breath   -- Bactrim (sulfamethoxazole-trimethoprim) -- Nausea And Vomiting   -- Demerol (meperidine) -- Hives   -- Dilaudid (hydromorphone (bulk)) -- Hives   -- Erythromycin -- Hives   -- Influenza virus vaccines -- Hives   -- Penicillins -- Hives   -- Reglan (metoclopramide) -- Nausea And Vomiting   -- Toradol (ketorolac) -- Nausea And Vomiting        Review of Systems   Constitution: Negative for weight gain and weight loss.   Cardiovascular: Negative for chest pain.   Respiratory: Positive for shortness of breath.    Musculoskeletal: Positive for back pain (right upper back) and neck pain (right neck). Negative for joint pain and joint swelling.   Gastrointestinal: Negative for abdominal pain, bowel incontinence, nausea and vomiting.   Genitourinary: Negative for bladder incontinence.   Neurological: Negative for numbness and paresthesias.         Objective:        General: Kellen is well-developed, well-nourished, appears stated age, in no acute distress, alert and oriented to time, place and person.     General    Vitals reviewed.  Constitutional: She is oriented to person, place, and time. She appears well-developed and well-nourished.   HENT:   Head: Normocephalic and atraumatic.   Pulmonary/Chest: Effort normal.   Neurological: She is alert and oriented to person, place, and time.   Psychiatric: She has a normal mood and affect. Her behavior is normal. Judgment and thought content normal.     General Musculoskeletal Exam   Gait: normal     Right Ankle/Foot Exam      Tests   Heel Walk: able to perform  Tiptoe Walk: able to perform    Left Ankle/Foot Exam     Tests   Heel Walk: able to perform  Tiptoe Walk: able to perform  Back (L-Spine & T-Spine) / Neck (C-Spine) Exam     Tenderness   The patient is tender to palpation of the right trapezial.     Neck (C-Spine) Range of Motion   Flexion:     40  Extension: 40Right Lateral Bend: 20 (with pain) Left Lateral Bend: 20 Right Rotation: 40 Left Rotation: 40     Spinal Sensation   Right Side Sensation  C-Spine Level: normal   L-Spine Level: normal  S-Spine Level: normal  Left Side Sensation  C-Spine Level: normal  L-Spine Level: normal  S-Spine Level: normal    Back (L-Spine & T-Spine) Tests   Right Side Tests  Straight leg raise:      Sitting SLR: > 70 degrees      Left Side Tests  Straight leg raise:     Sitting SLR: > 70 degrees          Other She has no scoliosis .  Spinal Kyphosis:  Absent      Muscle Strength   Right Upper Extremity   Biceps: 5/5/5   Deltoid:  5/5  Triceps:  5/5  Wrist extension: 5/5/5   Finger Flexors:  5/5  Left Upper Extremity  Biceps: 5/5/5   Deltoid:  5/5  Triceps:  5/5  Wrist extension: 5/5/5   Finger Flexors:  5/5  Right Lower Extremity   Hip Flexion: 5/5   Quadriceps:  5/5   Anterior tibial:  5/5/5  EHL:  5/5  Left Lower Extremity   Hip Flexion: 5/5   Quadriceps:  5/5   Anterior tibial:  5/5/5   EHL:  5/5    Reflexes     Left Side  Biceps:  2+  Triceps:  2+  Brachioradialis:  2+  Quadriceps:  2+  Achilles:  2+  Left Clifton's Sign:  Absent  Babinski Sign:  absent    Right Side   Biceps:  2+  Triceps:  2+  Brachioradialis:  2+  Quadriceps:  2+  Achilles:  2+  Right Clifton's Sign:  absent  Babinski Sign:  absent    Vascular Exam     Right Pulses        Carotid:                  2+    Left Pulses        Carotid:                  2+              Assessment:       1. DDD (degenerative disc disease), cervical    2. Neck pain    3. Osteoarthritis of spine with radiculopathy, cervical region    4. Muscle  spasm    5. Herniated cervical disc           Plan:       Orders Placed This Encounter    gabapentin (NEURONTIN) 300 MG capsule      1.  C7-T1 intralaminar JONATHAN with pain management has helped some, but still having right shoulder and arm pain.  We discussed cervical facet injections on right but will wait for now, she is feeling better.  She is not needing the medicine as often.  The medicine does help the pain  2.  mobic 15mg po Qday  3.  Robaxin 500mg po QID, taking 2 a day  4.  Return to Physical therapy for cervical ROM, retractions, scapula stabilization and myofascial release and HEP [place] regions luling, she is going to return to therapy.  She has not been able to go since 9/10  5. Gabapentin 300mg up to 3 a day  6.  RTC 3 months      Follow-up: Follow up in about 3 months (around 1/2/2020). If there are any questions prior to this, the patient was instructed to contact the office.

## 2019-10-14 ENCOUNTER — HOSPITAL ENCOUNTER (OUTPATIENT)
Dept: RADIOLOGY | Facility: HOSPITAL | Age: 44
Discharge: HOME | End: 2019-10-14
Attending: INTERNAL MEDICINE
Payer: OTHER GOVERNMENT

## 2019-10-14 DIAGNOSIS — Z12.31 ENCOUNTER FOR SCREENING MAMMOGRAM FOR MALIGNANT NEOPLASM OF BREAST: ICD-10-CM

## 2019-10-14 PROCEDURE — 77067 SCR MAMMO BI INCL CAD: CPT | Mod: TC

## 2019-10-14 PROCEDURE — 77067 MAMMO DIGITAL SCREENING BILAT WITH TOMOSYNTHESIS_CAD: ICD-10-PCS | Mod: 26,,, | Performed by: RADIOLOGY

## 2019-10-14 PROCEDURE — 77067 SCR MAMMO BI INCL CAD: CPT | Mod: 26,,, | Performed by: RADIOLOGY

## 2019-10-14 PROCEDURE — 77063 MAMMO DIGITAL SCREENING BILAT WITH TOMOSYNTHESIS_CAD: ICD-10-PCS | Mod: 26,,, | Performed by: RADIOLOGY

## 2019-10-14 PROCEDURE — 77063 BREAST TOMOSYNTHESIS BI: CPT | Mod: 26,,, | Performed by: RADIOLOGY

## 2019-10-28 ENCOUNTER — PATIENT MESSAGE (OUTPATIENT)
Dept: SPINE | Facility: CLINIC | Age: 44
End: 2019-10-28

## 2020-01-28 ENCOUNTER — PATIENT OUTREACH (OUTPATIENT)
Dept: ADMINISTRATIVE | Facility: OTHER | Age: 45
End: 2020-01-28

## 2020-01-30 NOTE — PROGRESS NOTES
Subjective:      Patient ID: Kellen Fraser is a 44 y.o. female.    Chief Complaint: Back Pain; Arm Pain; and Neck Pain    Ms Fraser is a 45 yo female here for follow up of neck and right arm pain.  She was last seen by me on 10/2/2019 and had the pain since July 21.  She was standing and cooking and then she felt like she was punched and took her breath away.  She was sent to PT and for cervical JONATHAN done 9/10/2019.  She feels like the injection helped the neck pain. We discussed trying some facet injections to help neck and shoulder.  She was going to return to PT.   She feels like the pain was doing better until 3 days ago.  She feels like it just started spasming.  She has been trying to massage.  She feels like being off work she was doing better.  She has had to be at different desks.  She is not sure if that started spasms. she is not sure why she was good for 4 weeks and then why flared it again.  She does not know if it is stress.  She does not know if different desks.  The pain is the worst right under the shoulder blade.  They did not try dry needling there.  The pain goes down the back.  The pain is all on the right.  She does not have left sided pain.  The pain is still going down the right arm.  The pain is 7/10 now, worst 10/10 when spasms hit, best 3/10 standing and walking    MRI cervical 8/6/2019  The visualized portions of the posterior fossa is unremarkable.  The craniocervical junction is within normal limits.  No prevertebral soft tissue swelling is identified.    The cervical alignment is maintained.  The bone marrow signal is within normal limits.  The vertebral body heights are maintained.    The cord is normal in signal without evidence of edema or expansion.  There are no intraspinal collections.  There is no evidence of mass effect on the cord.    There is multilevel disc desiccation.  Evaluation of the individual disc levels reveals the following:    C2-C3, there is a disc osteophyte  complex along with facet hypertrophy and uncovertebral hypertrophy.  The spinal canal and neural foramina are unremarkable.    C3-C4, there is a disc osteophyte complex along with facet hypertrophy and uncovertebral hypertrophy.  There is superimposed central disc protrusion.  The spinal canal is within normal limits.  There is mild bilateral neural foraminal narrowing.    C4-C5, there is a disc osteophyte complex along with facet hypertrophy and uncovertebral hypertrophy.  There is superimposed central disc protrusion.  There is mild narrowing the spinal canal.  The neural foramina is unremarkable.    C5-C6, there is a disc osteophyte complex along with facet hypertrophy and uncovertebral hypertrophy.  There is superimposed central disc protrusion.  There is mild spinal canal narrowing.  There is mild bilateral neural foraminal narrowing.    C6-C7, there is a disc osteophyte complex along with facet hypertrophy and uncovertebral hypertrophy.  There is superimposed central disc protrusion.  There is mild narrowing of the spinal canal.  There is mild bilateral neural foraminal narrowing.    C7-T1, there is a disc osteophyte complex along with facet hypertrophy and uncovertebral hypertrophy.  The spinal canal and neural foramina are unremarkable.    The flow voids within the vertebral arteries are unremarkable.  The paraspinal soft tissues are within normal limits.    There is no evidence of abnormal enhancement following intravenous contrast administration.    Impression      Mild multilevel degenerative changes in the cervical spine most prominent at C5-C6 and C6-C7 levels.  No evidence of significant spinal canal or neural foraminal narrowing.    No evidence of abnormal enhancement.    X-ray cervical 7/23/2019  Mild DJD.  The C5/C6 disc space is narrowed.  The other disc spaces are well maintained.  No fracture or dislocation.  No bone destruction identified.    X-ray thoracic 7/23/2019  Vertebral body heights are  well maintained.  No evidence of fracture or dislocation.  Osseous structures demonstrate no destructive lesions.    Intervertebral disc spaces are mildly narrowed with minimal endplate sclerosis visualized throughout the thoracic spine.  No other significant degenerative changes.    Surgical clips in the right upper abdomen.    The visualized lung bases are unremarkable.    Impression      No fracture or dislocation.  Mild degenerative changes as above.    Past Medical History:  11/2014: BRCA negative  No date: Cervical disc disease      Comment:  C5-C6, impacting thecal sac  No date: Gallbladder sludge  No date: Hypothyroidism  No date: Thyroid cancer  No date: Uterine fibroid    Past Surgical History:  No date: BREAST BIOPSY; Right      Comment:  exc bx  2012: CHOLECYSTECTOMY  No date: FOOT SURGERY; Right  2004: THYROIDECTOMY    Review of patient's family history indicates:  Problem: No Known Problems      Relation: Mother          Age of Onset: (Not Specified)  Problem: Hypertension      Relation: Father          Age of Onset: (Not Specified)  Problem: Diabetes      Relation: Father          Age of Onset: (Not Specified)  Problem: Thyroid cancer      Relation: Sister          Age of Onset: (Not Specified)  Problem: Hodgkin's lymphoma      Relation: Sister          Age of Onset: (Not Specified)  Problem: Cancer      Relation: Sister          Age of Onset: (Not Specified)          Comment: Hodgkin's, thyroid  Problem: Leukemia      Relation: Brother          Age of Onset: (Not Specified)  Problem: Cancer      Relation: Brother          Age of Onset: (Not Specified)          Comment: Acute leukemia  Problem: Cancer      Relation: Maternal Grandmother          Age of Onset: (Not Specified)          Comment: Breast  Problem: Diabetes      Relation: Maternal Grandmother          Age of Onset: (Not Specified)  Problem: Breast cancer      Relation: Maternal Grandmother          Age of Onset: 70  Problem: Heart disease       Relation: Maternal Grandfather          Age of Onset: (Not Specified)  Problem: Cancer      Relation: Paternal Grandfather          Age of Onset: (Not Specified)          Comment: Lymphoma  Problem: Ovarian cancer      Relation: Neg Hx          Age of Onset: (Not Specified)  Problem: Melanoma      Relation: Neg Hx          Age of Onset: (Not Specified)      Social History    Socioeconomic History      Marital status:       Spouse name: Not on file      Number of children: Not on file      Years of education: Not on file      Highest education level: Not on file    Occupational History      Not on file    Social Needs      Financial resource strain: Not on file      Food insecurity:        Worry: Not on file        Inability: Not on file      Transportation needs:        Medical: Not on file        Non-medical: Not on file    Tobacco Use      Smoking status: Never Smoker      Smokeless tobacco: Never Used    Substance and Sexual Activity      Alcohol use: Yes        Comment: socially      Drug use: No      Sexual activity: Yes    Lifestyle      Physical activity:        Days per week: Not on file        Minutes per session: Not on file      Stress: Not on file    Relationships      Social connections:        Talks on phone: Not on file        Gets together: Not on file        Attends Jewish service: Not on file        Active member of club or organization: Not on file        Attends meetings of clubs or organizations: Not on file        Relationship status: Not on file    Other Topics      Concerns:        Are you pregnant or think you may be?: Not Asked        Breast-feeding: Not Asked    Social History Narrative      Not on file      Current Outpatient Medications:  gabapentin (NEURONTIN) 300 MG capsule, Take 1 capsule (300 mg total) by mouth 3 (three) times daily as needed (burning pain)., Disp: 30 capsule, Rfl: 3  levothyroxine (SYNTHROID, LEVOTHROID) 175 MCG tablet, TAKE 1 TABLET BY MOUTH ONCE  DAILY, Disp: 90 tablet, Rfl: 3  meloxicam (MOBIC) 15 MG tablet, Take 1 tablet (15 mg total) by mouth daily as needed for Pain., Disp: 90 tablet, Rfl: 3  methocarbamol (ROBAXIN) 500 MG Tab, Take 1 tablet (500 mg total) by mouth 4 (four) times daily., Disp: 120 tablet, Rfl: 2  methylPREDNISolone (MEDROL DOSEPACK) 4 mg tablet, use as directed, Disp: 1 Package, Rfl: 0    Current Facility-Administered Medications:  0.9%  NaCl infusion, , Intravenous, 1 time in Clinic/HOD, Alireza Smith MD  aluminum & magnesium hydroxide-simethicone 400-400-40 mg/5 mL suspension 30 mL, 30 mL, Oral, Q6H PRN, Aileen Vargas NP, 30 mL at 01/07/19 1049        Review of patient's allergies indicates:   -- Morphine -- Shortness Of Breath   -- Bactrim (sulfamethoxazole-trimethoprim) -- Nausea And Vomiting   -- Demerol (meperidine) -- Hives   -- Dilaudid (hydromorphone (bulk)) -- Hives   -- Erythromycin -- Hives   -- Influenza virus vaccines -- Hives   -- Penicillins -- Hives   -- Reglan (metoclopramide) -- Nausea And Vomiting   -- Toradol (ketorolac) -- Nausea And Vomiting        Review of Systems   Constitution: Negative for weight gain and weight loss.   Cardiovascular: Negative for chest pain.   Respiratory: Positive for shortness of breath.    Musculoskeletal: Positive for back pain (right upper back) and neck pain (right neck). Negative for joint pain and joint swelling.   Gastrointestinal: Negative for abdominal pain, bowel incontinence, nausea and vomiting.   Genitourinary: Negative for bladder incontinence.   Neurological: Negative for numbness and paresthesias.         Objective:        General: Kellen is well-developed, well-nourished, appears stated age, in no acute distress, alert and oriented to time, place and person.     General    Vitals reviewed.  Constitutional: She is oriented to person, place, and time. She appears well-developed and well-nourished.   HENT:   Head: Normocephalic and atraumatic.   Pulmonary/Chest: Effort  normal.   Neurological: She is alert and oriented to person, place, and time.   Psychiatric: She has a normal mood and affect. Her behavior is normal. Judgment and thought content normal.     General Musculoskeletal Exam   Gait: normal     Right Ankle/Foot Exam     Tests   Heel Walk: able to perform  Tiptoe Walk: able to perform    Left Ankle/Foot Exam     Tests   Heel Walk: able to perform  Tiptoe Walk: able to perform  Back (L-Spine & T-Spine) / Neck (C-Spine) Exam     Tenderness   The patient is tender to palpation of the right trapezial and right scapular. Right paramedian tenderness of the Lower T-Spine.     Neck (C-Spine) Range of Motion   Flexion:     40  Extension: 40Right Lateral Bend: 20 Left Lateral Bend: 20 Right Rotation: 40 Left Rotation: 40     Spinal Sensation   Right Side Sensation  C-Spine Level: normal   L-Spine Level: normal  S-Spine Level: normal  Left Side Sensation  C-Spine Level: normal  L-Spine Level: normal  S-Spine Level: normal    Back (L-Spine & T-Spine) Tests   Right Side Tests  Straight leg raise:      Sitting SLR: > 70 degrees      Left Side Tests  Straight leg raise:     Sitting SLR: > 70 degrees          Other She has no scoliosis .  Spinal Kyphosis:  Absent    Comments:  Trigger point right levator scapula, rhomboid  and serratus anterior      Muscle Strength   Right Upper Extremity   Biceps: 5/5/5   Deltoid:  5/5  Triceps:  5/5  Wrist extension: 5/5/5   Finger Flexors:  5/5  Left Upper Extremity  Biceps: 5/5/5   Deltoid:  5/5  Triceps:  5/5  Wrist extension: 5/5/5   Finger Flexors:  5/5  Right Lower Extremity   Hip Flexion: 5/5   Quadriceps:  5/5   Anterior tibial:  5/5/5  EHL:  5/5  Left Lower Extremity   Hip Flexion: 5/5   Quadriceps:  5/5   Anterior tibial:  5/5/5   EHL:  5/5    Reflexes     Left Side  Biceps:  2+  Triceps:  2+  Brachioradialis:  2+  Quadriceps:  2+  Achilles:  2+  Left Clifton's Sign:  Absent  Babinski Sign:  absent    Right Side   Biceps:  2+  Triceps:   2+  Brachioradialis:  2+  Quadriceps:  2+  Achilles:  2+  Right Clifton's Sign:  absent  Babinski Sign:  absent    Vascular Exam     Right Pulses        Carotid:                  2+    Left Pulses        Carotid:                  2+              Assessment:       1. Muscle spasm    2. Neck pain    3. Chronic right-sided thoracic back pain           Plan:       Orders Placed This Encounter    INJECT TRIGGER POINTS, > 3    triamcinolone acetonide injection 40 mg      1.  C7-T1 intralaminar JONATHAN with pain management has helped some, but still having right shoulder and arm pain.  We discussed cervical facet injections on right but will wait for now, she is feeling better.  She is not needing the medicine as often.  The medicine does help the pain  2.  mobic 15mg po Qday  3.  Robaxin 500mg po QID, taking 2 a day  4.  Return to Physical therapy she does not feel like it is helping.  She has periods when better.  We disucsssed posture and watching it at computer.  We also discussed trying to relax shoulders.  We also discussed an alarm on phone.    5. Gabapentin 300mg up to 3 a day  6.  Trigger point injection/ A time out was performed, the correct patient, procedure, site, and position confirmed.      right trigger point injection: levator scapula, rhomboid  and serratus anterior (3)  the risks and benefits were explained verbal consent was obtained.  A time out was taken.  she was then placed in seated position the trigger points were located and prepped with alcohol.  she was then injected with a 22 gauge needle with 40mg kenolog and 2 cc of 2% lidocaine, dispensed equally between each spot.  There were no complications, she felt some immediate relief of the pain.  7. RTC 3 months      Follow-up: Follow up in about 3 months (around 4/30/2020). If there are any questions prior to this, the patient was instructed to contact the office.

## 2020-01-31 ENCOUNTER — OFFICE VISIT (OUTPATIENT)
Dept: SPINE | Facility: CLINIC | Age: 45
End: 2020-01-31
Attending: PHYSICAL MEDICINE & REHABILITATION
Payer: OTHER GOVERNMENT

## 2020-01-31 VITALS
HEART RATE: 75 BPM | DIASTOLIC BLOOD PRESSURE: 77 MMHG | BODY MASS INDEX: 40.12 KG/M2 | HEIGHT: 64 IN | WEIGHT: 235 LBS | SYSTOLIC BLOOD PRESSURE: 129 MMHG

## 2020-01-31 DIAGNOSIS — M54.6 CHRONIC RIGHT-SIDED THORACIC BACK PAIN: ICD-10-CM

## 2020-01-31 DIAGNOSIS — M62.838 MUSCLE SPASM: Primary | ICD-10-CM

## 2020-01-31 DIAGNOSIS — G89.29 CHRONIC RIGHT-SIDED THORACIC BACK PAIN: ICD-10-CM

## 2020-01-31 DIAGNOSIS — M54.2 NECK PAIN: ICD-10-CM

## 2020-01-31 PROCEDURE — 99999 PR PBB SHADOW E&M-EST. PATIENT-LVL III: ICD-10-PCS | Mod: PBBFAC,,, | Performed by: PHYSICAL MEDICINE & REHABILITATION

## 2020-01-31 PROCEDURE — 99999 PR PBB SHADOW E&M-EST. PATIENT-LVL III: CPT | Mod: PBBFAC,,, | Performed by: PHYSICAL MEDICINE & REHABILITATION

## 2020-01-31 PROCEDURE — 99214 PR OFFICE/OUTPT VISIT, EST, LEVL IV, 30-39 MIN: ICD-10-PCS | Mod: 25,S$PBB,, | Performed by: PHYSICAL MEDICINE & REHABILITATION

## 2020-01-31 PROCEDURE — 96372 THER/PROPH/DIAG INJ SC/IM: CPT | Mod: ,,, | Performed by: PHYSICAL MEDICINE & REHABILITATION

## 2020-01-31 PROCEDURE — 99214 OFFICE O/P EST MOD 30 MIN: CPT | Mod: 25,S$PBB,, | Performed by: PHYSICAL MEDICINE & REHABILITATION

## 2020-01-31 PROCEDURE — 99213 OFFICE O/P EST LOW 20 MIN: CPT | Mod: PBBFAC | Performed by: PHYSICAL MEDICINE & REHABILITATION

## 2020-01-31 PROCEDURE — 96372 PR INJECTION,THERAP/PROPH/DIAG2ST, IM OR SUBCUT: ICD-10-PCS | Mod: ,,, | Performed by: PHYSICAL MEDICINE & REHABILITATION

## 2020-01-31 RX ORDER — TRIAMCINOLONE ACETONIDE 40 MG/ML
40 INJECTION, SUSPENSION INTRA-ARTICULAR; INTRAMUSCULAR ONCE
Status: COMPLETED | OUTPATIENT
Start: 2020-01-31 | End: 2020-01-31

## 2020-01-31 RX ADMIN — TRIAMCINOLONE ACETONIDE 40 MG: 40 INJECTION, SUSPENSION INTRA-ARTICULAR; INTRAMUSCULAR at 04:01

## 2020-04-24 RX ORDER — LEVOTHYROXINE SODIUM 175 UG/1
175 TABLET ORAL DAILY
Qty: 90 TABLET | Refills: 3 | Status: SHIPPED | OUTPATIENT
Start: 2020-04-24 | End: 2020-09-09 | Stop reason: DRUGHIGH

## 2020-05-26 ENCOUNTER — TELEPHONE (OUTPATIENT)
Dept: INTERNAL MEDICINE | Facility: CLINIC | Age: 45
End: 2020-05-26

## 2020-05-26 ENCOUNTER — PATIENT MESSAGE (OUTPATIENT)
Dept: INTERNAL MEDICINE | Facility: CLINIC | Age: 45
End: 2020-05-26

## 2020-05-26 DIAGNOSIS — H57.9 EYE PROBLEM: Primary | ICD-10-CM

## 2020-05-26 NOTE — TELEPHONE ENCOUNTER
----- Message from Eufemia Olson sent at 5/26/2020  3:08 PM CDT -----  Contact: Self   Patient would like to get a referral.  Referral to what specialty:  ophthalmology  Does the patient want the referral with a specific physician:  Dr. Jacob Bowen  [facility] the [facility]:  [facility]  Reason (be specific):  Urgent care visit/ dr advise to see eye dr asap  Does the patient already have the specialty clinic appointment scheduled:  Yes  If yes, what date is the appointment scheduled:   5/27 9:20am  Is the insurance listed in Epic correct? (this is important for a referral):  yes  Comments:      ##Advise the patient that once the physician approves this either a nurse or the  will return their call##

## 2020-05-26 NOTE — PROGRESS NOTES
Subjective:      Patient ID: Kellen Fraser is a 45 y.o. female.    Chief Complaint: Follow-up    Ms Fraser is a 44 yo female here for follow up of neck and right arm pain.  She was last seen by me on 1/31/2020 and had the pain since July 21.  She was standing and cooking and then she felt like she was punched and took her breath away.  She was sent to PT and for cervical JONATHAN done 9/10/2019.  She feels like the injection helped the neck pain. She was doing well until 1/27/2020 and had increased pain and we did trigger point injections, right levator scapula, rhomboid  and serratus anterior (3).  Today, she is doing better. She has good days and bad days.  She has a couple of tender spots, but not as bad.  She feels like the injections helped, and then she was doing weight loss program and helped.  She has been taking magnesium and amino acids and it has helped.  She is currently not having any pain.  The pain under the shoulder blade will bother her the most, but only once a week.  She does not have pain all the time.  She uses meds as needed.  She has been working through covid.  She is doing well with pain.  The pain is 1/10, worst 4/10 under shoulder blade, best 0/10    MRI cervical 8/6/2019  The visualized portions of the posterior fossa is unremarkable.  The craniocervical junction is within normal limits.  No prevertebral soft tissue swelling is identified.    The cervical alignment is maintained.  The bone marrow signal is within normal limits.  The vertebral body heights are maintained.    The cord is normal in signal without evidence of edema or expansion.  There are no intraspinal collections.  There is no evidence of mass effect on the cord.    There is multilevel disc desiccation.  Evaluation of the individual disc levels reveals the following:    C2-C3, there is a disc osteophyte complex along with facet hypertrophy and uncovertebral hypertrophy.  The spinal canal and neural foramina are  unremarkable.    C3-C4, there is a disc osteophyte complex along with facet hypertrophy and uncovertebral hypertrophy.  There is superimposed central disc protrusion.  The spinal canal is within normal limits.  There is mild bilateral neural foraminal narrowing.    C4-C5, there is a disc osteophyte complex along with facet hypertrophy and uncovertebral hypertrophy.  There is superimposed central disc protrusion.  There is mild narrowing the spinal canal.  The neural foramina is unremarkable.    C5-C6, there is a disc osteophyte complex along with facet hypertrophy and uncovertebral hypertrophy.  There is superimposed central disc protrusion.  There is mild spinal canal narrowing.  There is mild bilateral neural foraminal narrowing.    C6-C7, there is a disc osteophyte complex along with facet hypertrophy and uncovertebral hypertrophy.  There is superimposed central disc protrusion.  There is mild narrowing of the spinal canal.  There is mild bilateral neural foraminal narrowing.    C7-T1, there is a disc osteophyte complex along with facet hypertrophy and uncovertebral hypertrophy.  The spinal canal and neural foramina are unremarkable.    The flow voids within the vertebral arteries are unremarkable.  The paraspinal soft tissues are within normal limits.    There is no evidence of abnormal enhancement following intravenous contrast administration.    Impression      Mild multilevel degenerative changes in the cervical spine most prominent at C5-C6 and C6-C7 levels.  No evidence of significant spinal canal or neural foraminal narrowing.    No evidence of abnormal enhancement.    X-ray cervical 7/23/2019  Mild DJD.  The C5/C6 disc space is narrowed.  The other disc spaces are well maintained.  No fracture or dislocation.  No bone destruction identified.    X-ray thoracic 7/23/2019  Vertebral body heights are well maintained.  No evidence of fracture or dislocation.  Osseous structures demonstrate no destructive  lesions.    Intervertebral disc spaces are mildly narrowed with minimal endplate sclerosis visualized throughout the thoracic spine.  No other significant degenerative changes.    Surgical clips in the right upper abdomen.    The visualized lung bases are unremarkable.    Impression      No fracture or dislocation.  Mild degenerative changes as above.    Past Medical History:  11/2014: BRCA negative  No date: Cervical disc disease      Comment:  C5-C6, impacting thecal sac  No date: Gallbladder sludge  No date: Hypothyroidism  No date: Thyroid cancer  No date: Uterine fibroid    Past Surgical History:  No date: BREAST BIOPSY; Right      Comment:  exc bx  2012: CHOLECYSTECTOMY  No date: FOOT SURGERY; Right  2004: THYROIDECTOMY    Review of patient's family history indicates:  Problem: No Known Problems      Relation: Mother          Age of Onset: (Not Specified)  Problem: Hypertension      Relation: Father          Age of Onset: (Not Specified)  Problem: Diabetes      Relation: Father          Age of Onset: (Not Specified)  Problem: Thyroid cancer      Relation: Sister          Age of Onset: (Not Specified)  Problem: Hodgkin's lymphoma      Relation: Sister          Age of Onset: (Not Specified)  Problem: Cancer      Relation: Sister          Age of Onset: (Not Specified)          Comment: Hodgkin's, thyroid  Problem: Leukemia      Relation: Brother          Age of Onset: (Not Specified)  Problem: Cancer      Relation: Brother          Age of Onset: (Not Specified)          Comment: Acute leukemia  Problem: Cancer      Relation: Maternal Grandmother          Age of Onset: (Not Specified)          Comment: Breast  Problem: Diabetes      Relation: Maternal Grandmother          Age of Onset: (Not Specified)  Problem: Breast cancer      Relation: Maternal Grandmother          Age of Onset: 70  Problem: Heart disease      Relation: Maternal Grandfather          Age of Onset: (Not Specified)  Problem: Cancer      Relation:  Paternal Grandfather          Age of Onset: (Not Specified)          Comment: Lymphoma  Problem: Ovarian cancer      Relation: Neg Hx          Age of Onset: (Not Specified)  Problem: Melanoma      Relation: Neg Hx          Age of Onset: (Not Specified)      Social History    Socioeconomic History      Marital status:       Spouse name: Not on file      Number of children: Not on file      Years of education: Not on file      Highest education level: Not on file    Occupational History      Not on file    Social Needs      Financial resource strain: Not on file      Food insecurity:        Worry: Not on file        Inability: Not on file      Transportation needs:        Medical: Not on file        Non-medical: Not on file    Tobacco Use      Smoking status: Never Smoker      Smokeless tobacco: Never Used    Substance and Sexual Activity      Alcohol use: Yes        Comment: socially      Drug use: No      Sexual activity: Yes    Lifestyle      Physical activity:        Days per week: Not on file        Minutes per session: Not on file      Stress: Not on file    Relationships      Social connections:        Talks on phone: Not on file        Gets together: Not on file        Attends Oriental orthodox service: Not on file        Active member of club or organization: Not on file        Attends meetings of clubs or organizations: Not on file        Relationship status: Not on file    Other Topics      Concerns:        Are you pregnant or think you may be?: Not Asked        Breast-feeding: Not Asked    Social History Narrative      Not on file      Current Outpatient Medications:  gabapentin (NEURONTIN) 300 MG capsule, Take 1 capsule (300 mg total) by mouth 3 (three) times daily as needed (burning pain)., Disp: 30 capsule, Rfl: 3  levothyroxine (SYNTHROID, LEVOTHROID) 175 MCG tablet, TAKE 1 TABLET BY MOUTH ONCE DAILY, Disp: 90 tablet, Rfl: 3  meloxicam (MOBIC) 15 MG tablet, Take 1 tablet (15 mg total) by mouth daily as  needed for Pain., Disp: 90 tablet, Rfl: 3  methocarbamol (ROBAXIN) 500 MG Tab, Take 1 tablet (500 mg total) by mouth 4 (four) times daily., Disp: 120 tablet, Rfl: 2  methylPREDNISolone (MEDROL DOSEPACK) 4 mg tablet, use as directed, Disp: 1 Package, Rfl: 0    Current Facility-Administered Medications:  0.9%  NaCl infusion, , Intravenous, 1 time in Clinic/HOD, Alireza Smith MD  aluminum & magnesium hydroxide-simethicone 400-400-40 mg/5 mL suspension 30 mL, 30 mL, Oral, Q6H PRN, Aileen Vargas NP, 30 mL at 01/07/19 1049        Review of patient's allergies indicates:   -- Morphine -- Shortness Of Breath   -- Bactrim (sulfamethoxazole-trimethoprim) -- Nausea And Vomiting   -- Demerol (meperidine) -- Hives   -- Dilaudid (hydromorphone (bulk)) -- Hives   -- Erythromycin -- Hives   -- Influenza virus vaccines -- Hives   -- Penicillins -- Hives   -- Reglan (metoclopramide) -- Nausea And Vomiting   -- Toradol (ketorolac) -- Nausea And Vomiting        Review of Systems   Constitution: Negative for weight gain and weight loss.   Cardiovascular: Negative for chest pain.   Respiratory: Negative for shortness of breath.    Musculoskeletal: Positive for back pain (right upper back) and neck pain (right neck). Negative for joint pain and joint swelling.   Gastrointestinal: Negative for abdominal pain, bowel incontinence, nausea and vomiting.   Genitourinary: Negative for bladder incontinence.   Neurological: Negative for numbness and paresthesias.         Objective:        General: Kellen is well-developed, well-nourished, appears stated age, in no acute distress, alert and oriented to time, place and person.     General    Vitals reviewed.  Constitutional: She is oriented to person, place, and time. She appears well-developed and well-nourished.   HENT:   Head: Normocephalic and atraumatic.   Pulmonary/Chest: Effort normal.   Neurological: She is alert and oriented to person, place, and time.   Psychiatric: She has a normal  mood and affect. Her behavior is normal. Judgment and thought content normal.     General Musculoskeletal Exam   Gait: normal     Right Ankle/Foot Exam     Tests   Heel Walk: able to perform  Tiptoe Walk: able to perform    Left Ankle/Foot Exam     Tests   Heel Walk: able to perform  Tiptoe Walk: able to perform  Back (L-Spine & T-Spine) / Neck (C-Spine) Exam     Tenderness   The patient is tender to palpation of the right trapezial and right scapular. Right paramedian tenderness of the Lower T-Spine.     Neck (C-Spine) Range of Motion   Flexion:     40  Extension: 40Right Lateral Bend: 20 Left Lateral Bend: 20 Right Rotation: 40 Left Rotation: 40     Spinal Sensation   Right Side Sensation  C-Spine Level: normal   L-Spine Level: normal  S-Spine Level: normal  Left Side Sensation  C-Spine Level: normal  L-Spine Level: normal  S-Spine Level: normal    Back (L-Spine & T-Spine) Tests   Right Side Tests  Straight leg raise:      Sitting SLR: > 70 degrees      Left Side Tests  Straight leg raise:     Sitting SLR: > 70 degrees          Other She has no scoliosis .  Spinal Kyphosis:  Absent    Comments:  Trigger point right infraspinatus and right inferior edge of scapula      Muscle Strength   Right Upper Extremity   Biceps: 5/5/5   Deltoid:  5/5  Triceps:  5/5  Wrist extension: 5/5/5   Finger Flexors:  5/5  Left Upper Extremity  Biceps: 5/5/5   Deltoid:  5/5  Triceps:  5/5  Wrist extension: 5/5/5   Finger Flexors:  5/5  Right Lower Extremity   Hip Flexion: 5/5   Quadriceps:  5/5   Anterior tibial:  5/5/5  EHL:  5/5  Left Lower Extremity   Hip Flexion: 5/5   Quadriceps:  5/5   Anterior tibial:  5/5/5   EHL:  5/5    Reflexes     Left Side  Biceps:  2+  Triceps:  2+  Brachioradialis:  2+  Quadriceps:  2+  Achilles:  2+  Left Clifton's Sign:  Absent  Babinski Sign:  absent    Right Side   Biceps:  2+  Triceps:  2+  Brachioradialis:  2+  Quadriceps:  2+  Achilles:  2+  Right Clifton's Sign:  absent  Babinski Sign:   absent    Vascular Exam     Right Pulses        Carotid:                  2+    Left Pulses        Carotid:                  2+              Assessment:       1. Muscle spasm    2. Neck pain    3. Chronic right-sided thoracic back pain    4. DDD (degenerative disc disease), cervical    5. Osteoarthritis of spine with radiculopathy, cervical region    6. Herniated cervical disc           Plan:       Orders Placed This Encounter    meloxicam (MOBIC) 15 MG tablet      1.  C7-T1 intralaminar JONATHAN with pain management 9/21/2019 helped some, trigger point injections, right levator scapula, rhomboid  and serratus anterior 1/31/20  also helped   2.  mobic 15mg po Qday as needed  3.  Robaxin 500mg po QID, taking 2 a day as needed   4.  Continue PT HEP, and working on posture.     5. Gabapentin 300mg up to 3 a day  6.  She is taking amino acids and feels like that has helped along with injection . She is no longer having daily pain.    7. RTC 4 months, she can cancel if feeling good      Follow-up: Follow up in about 4 months (around 9/27/2020). If there are any questions prior to this, the patient was instructed to contact the office.

## 2020-05-26 NOTE — TELEPHONE ENCOUNTER
Please fax the order to the [facility] number listed in the message.    Please also coordinate with the patient about where to send this for [facility].

## 2020-05-27 ENCOUNTER — OFFICE VISIT (OUTPATIENT)
Dept: SPINE | Facility: CLINIC | Age: 45
End: 2020-05-27
Attending: PHYSICAL MEDICINE & REHABILITATION
Payer: OTHER GOVERNMENT

## 2020-05-27 VITALS
DIASTOLIC BLOOD PRESSURE: 60 MMHG | HEART RATE: 69 BPM | SYSTOLIC BLOOD PRESSURE: 117 MMHG | BODY MASS INDEX: 40.12 KG/M2 | WEIGHT: 235 LBS | HEIGHT: 64 IN

## 2020-05-27 DIAGNOSIS — M54.2 NECK PAIN: ICD-10-CM

## 2020-05-27 DIAGNOSIS — M47.22 OSTEOARTHRITIS OF SPINE WITH RADICULOPATHY, CERVICAL REGION: ICD-10-CM

## 2020-05-27 DIAGNOSIS — M54.6 CHRONIC RIGHT-SIDED THORACIC BACK PAIN: ICD-10-CM

## 2020-05-27 DIAGNOSIS — G89.29 CHRONIC RIGHT-SIDED THORACIC BACK PAIN: ICD-10-CM

## 2020-05-27 DIAGNOSIS — M50.30 DDD (DEGENERATIVE DISC DISEASE), CERVICAL: ICD-10-CM

## 2020-05-27 DIAGNOSIS — M62.838 MUSCLE SPASM: Primary | ICD-10-CM

## 2020-05-27 DIAGNOSIS — M50.20 HERNIATED CERVICAL DISC: ICD-10-CM

## 2020-05-27 PROCEDURE — 99213 PR OFFICE/OUTPT VISIT, EST, LEVL III, 20-29 MIN: ICD-10-PCS | Mod: S$PBB,,, | Performed by: PHYSICAL MEDICINE & REHABILITATION

## 2020-05-27 PROCEDURE — 99999 PR PBB SHADOW E&M-EST. PATIENT-LVL III: CPT | Mod: PBBFAC,,, | Performed by: PHYSICAL MEDICINE & REHABILITATION

## 2020-05-27 PROCEDURE — 99999 PR PBB SHADOW E&M-EST. PATIENT-LVL III: ICD-10-PCS | Mod: PBBFAC,,, | Performed by: PHYSICAL MEDICINE & REHABILITATION

## 2020-05-27 PROCEDURE — 99213 OFFICE O/P EST LOW 20 MIN: CPT | Mod: S$PBB,,, | Performed by: PHYSICAL MEDICINE & REHABILITATION

## 2020-05-27 PROCEDURE — 99213 OFFICE O/P EST LOW 20 MIN: CPT | Mod: PBBFAC | Performed by: PHYSICAL MEDICINE & REHABILITATION

## 2020-05-27 RX ORDER — MOXIFLOXACIN 5 MG/ML
1 SOLUTION/ DROPS OPHTHALMIC 3 TIMES DAILY
COMMUNITY
End: 2020-09-09

## 2020-05-27 RX ORDER — MELOXICAM 15 MG/1
15 TABLET ORAL DAILY PRN
Qty: 90 TABLET | Refills: 3 | Status: SHIPPED | OUTPATIENT
Start: 2020-05-27 | End: 2022-07-12

## 2020-07-09 ENCOUNTER — PATIENT MESSAGE (OUTPATIENT)
Dept: INTERNAL MEDICINE | Facility: CLINIC | Age: 45
End: 2020-07-09

## 2020-07-09 DIAGNOSIS — Z20.822 SUSPECTED COVID-19 VIRUS INFECTION: Primary | ICD-10-CM

## 2020-07-09 NOTE — TELEPHONE ENCOUNTER
Please schedule for COVID test, patient having headache and sinus congestion. I have sent the patient a [facility] message.

## 2020-07-10 ENCOUNTER — LAB VISIT (OUTPATIENT)
Dept: INTERNAL MEDICINE | Facility: CLINIC | Age: 45
End: 2020-07-10
Payer: OTHER GOVERNMENT

## 2020-07-10 DIAGNOSIS — Z20.822 SUSPECTED COVID-19 VIRUS INFECTION: ICD-10-CM

## 2020-07-10 PROCEDURE — U0003 INFECTIOUS AGENT DETECTION BY NUCLEIC ACID (DNA OR RNA); SEVERE ACUTE RESPIRATORY SYNDROME CORONAVIRUS 2 (SARS-COV-2) (CORONAVIRUS DISEASE [COVID-19]), AMPLIFIED PROBE TECHNIQUE, MAKING USE OF HIGH THROUGHPUT TECHNOLOGIES AS DESCRIBED BY CMS-2020-01-R: HCPCS

## 2020-07-13 LAB — SARS-COV-2 RNA RESP QL NAA+PROBE: NOT DETECTED

## 2020-07-14 ENCOUNTER — PATIENT MESSAGE (OUTPATIENT)
Dept: INTERNAL MEDICINE | Facility: CLINIC | Age: 45
End: 2020-07-14

## 2020-07-14 NOTE — LETTER
July 15, 2020    Kellen Fraser  [address] [place] [address]             [facility] - Internal Medicine  [address]  [place] [address]  Phone: [phone]  Fax: [phone] To Whom It May Concern:     I am the Primary Care Physician for Ms. Kellen Fraser (born 1975). She is recovering from illness and I recommend she remain off of work the next few days for recovery. She can return to work on Monday, July 20th.      If you have any questions or concerns, please don't hesitate to call.    Sincerely,         Alireza Smith MD

## 2020-08-31 ENCOUNTER — PATIENT MESSAGE (OUTPATIENT)
Dept: INTERNAL MEDICINE | Facility: CLINIC | Age: 45
End: 2020-08-31

## 2020-09-02 ENCOUNTER — LAB VISIT (OUTPATIENT)
Dept: LAB | Facility: HOSPITAL | Age: 45
End: 2020-09-02
Attending: INTERNAL MEDICINE
Payer: OTHER GOVERNMENT

## 2020-09-02 DIAGNOSIS — Z00.00 HEALTH MAINTENANCE EXAMINATION: ICD-10-CM

## 2020-09-02 LAB
ALBUMIN SERPL BCP-MCNC: 3.6 G/DL (ref 3.5–5.2)
ALP SERPL-CCNC: 84 U/L (ref 55–135)
ALT SERPL W/O P-5'-P-CCNC: 17 U/L (ref 10–44)
ANION GAP SERPL CALC-SCNC: 8 MMOL/L (ref 8–16)
AST SERPL-CCNC: 16 U/L (ref 10–40)
BILIRUB SERPL-MCNC: 0.3 MG/DL (ref 0.1–1)
BUN SERPL-MCNC: 12 MG/DL (ref 6–20)
CALCIUM SERPL-MCNC: 9 MG/DL (ref 8.7–10.5)
CHLORIDE SERPL-SCNC: 105 MMOL/L (ref 95–110)
CHOLEST SERPL-MCNC: 180 MG/DL (ref 120–199)
CHOLEST/HDLC SERPL: 3.5 {RATIO} (ref 2–5)
CO2 SERPL-SCNC: 26 MMOL/L (ref 23–29)
CREAT SERPL-MCNC: 0.8 MG/DL (ref 0.5–1.4)
ERYTHROCYTE [DISTWIDTH] IN BLOOD BY AUTOMATED COUNT: 14.9 % (ref 11.5–14.5)
EST. GFR  (AFRICAN AMERICAN): >60 ML/MIN/1.73 M^2
EST. GFR  (NON AFRICAN AMERICAN): >60 ML/MIN/1.73 M^2
GLUCOSE SERPL-MCNC: 87 MG/DL (ref 70–110)
HCT VFR BLD AUTO: 40.6 % (ref 37–48.5)
HDLC SERPL-MCNC: 51 MG/DL (ref 40–75)
HDLC SERPL: 28.3 % (ref 20–50)
HGB BLD-MCNC: 12.2 G/DL (ref 12–16)
LDLC SERPL CALC-MCNC: 101.4 MG/DL (ref 63–159)
MCH RBC QN AUTO: 27.8 PG (ref 27–31)
MCHC RBC AUTO-ENTMCNC: 30 G/DL (ref 32–36)
MCV RBC AUTO: 93 FL (ref 82–98)
NONHDLC SERPL-MCNC: 129 MG/DL
PLATELET # BLD AUTO: 225 K/UL (ref 150–350)
PMV BLD AUTO: 11.5 FL (ref 9.2–12.9)
POTASSIUM SERPL-SCNC: 4 MMOL/L (ref 3.5–5.1)
PROT SERPL-MCNC: 6.9 G/DL (ref 6–8.4)
RBC # BLD AUTO: 4.39 M/UL (ref 4–5.4)
SODIUM SERPL-SCNC: 139 MMOL/L (ref 136–145)
T4 FREE SERPL-MCNC: 1.03 NG/DL (ref 0.71–1.51)
TRIGL SERPL-MCNC: 138 MG/DL (ref 30–150)
TSH SERPL DL<=0.005 MIU/L-ACNC: 23.66 UIU/ML (ref 0.4–4)
WBC # BLD AUTO: 9.84 K/UL (ref 3.9–12.7)

## 2020-09-02 PROCEDURE — 84439 ASSAY OF FREE THYROXINE: CPT

## 2020-09-02 PROCEDURE — 36415 COLL VENOUS BLD VENIPUNCTURE: CPT

## 2020-09-02 PROCEDURE — 80053 COMPREHEN METABOLIC PANEL: CPT

## 2020-09-02 PROCEDURE — 84443 ASSAY THYROID STIM HORMONE: CPT

## 2020-09-02 PROCEDURE — 80061 LIPID PANEL: CPT

## 2020-09-02 PROCEDURE — 85027 COMPLETE CBC AUTOMATED: CPT

## 2020-09-09 ENCOUNTER — OFFICE VISIT (OUTPATIENT)
Dept: INTERNAL MEDICINE | Facility: CLINIC | Age: 45
End: 2020-09-09
Payer: OTHER GOVERNMENT

## 2020-09-09 VITALS
SYSTOLIC BLOOD PRESSURE: 124 MMHG | BODY MASS INDEX: 40.97 KG/M2 | WEIGHT: 240 LBS | HEIGHT: 64 IN | HEART RATE: 80 BPM | OXYGEN SATURATION: 96 % | DIASTOLIC BLOOD PRESSURE: 86 MMHG

## 2020-09-09 DIAGNOSIS — Z12.31 ENCOUNTER FOR SCREENING MAMMOGRAM FOR BREAST CANCER: ICD-10-CM

## 2020-09-09 DIAGNOSIS — E89.0 POST-SURGICAL HYPOTHYROIDISM: ICD-10-CM

## 2020-09-09 DIAGNOSIS — R51.9 RIGHT-SIDED HEADACHE: ICD-10-CM

## 2020-09-09 DIAGNOSIS — Z00.00 ANNUAL PHYSICAL EXAM: Primary | ICD-10-CM

## 2020-09-09 DIAGNOSIS — Z80.3 FAMILY HISTORY OF BREAST CANCER: ICD-10-CM

## 2020-09-09 DIAGNOSIS — D25.9 UTERINE LEIOMYOMA, UNSPECIFIED LOCATION: ICD-10-CM

## 2020-09-09 PROCEDURE — 99999 PR PBB SHADOW E&M-EST. PATIENT-LVL V: ICD-10-PCS | Mod: PBBFAC,,, | Performed by: INTERNAL MEDICINE

## 2020-09-09 PROCEDURE — 99215 OFFICE O/P EST HI 40 MIN: CPT | Mod: PBBFAC | Performed by: INTERNAL MEDICINE

## 2020-09-09 PROCEDURE — 99396 PR PREVENTIVE VISIT,EST,40-64: ICD-10-PCS | Mod: S$PBB,,, | Performed by: INTERNAL MEDICINE

## 2020-09-09 PROCEDURE — 99999 PR PBB SHADOW E&M-EST. PATIENT-LVL V: CPT | Mod: PBBFAC,,, | Performed by: INTERNAL MEDICINE

## 2020-09-09 PROCEDURE — 99396 PREV VISIT EST AGE 40-64: CPT | Mod: S$PBB,,, | Performed by: INTERNAL MEDICINE

## 2020-09-09 RX ORDER — LEVOTHYROXINE SODIUM 200 UG/1
200 TABLET ORAL
Qty: 90 TABLET | Refills: 3 | Status: SHIPPED | OUTPATIENT
Start: 2020-09-09 | End: 2021-01-14 | Stop reason: DRUGHIGH

## 2020-09-09 RX ORDER — BUTALBITAL, ACETAMINOPHEN AND CAFFEINE 50; 325; 40 MG/1; MG/1; MG/1
1 TABLET ORAL EVERY 12 HOURS PRN
Qty: 30 TABLET | Refills: 0 | Status: SHIPPED | OUTPATIENT
Start: 2020-09-09 | End: 2020-09-09 | Stop reason: SDUPTHER

## 2020-09-09 RX ORDER — BUTALBITAL, ACETAMINOPHEN AND CAFFEINE 50; 325; 40 MG/1; MG/1; MG/1
1 TABLET ORAL EVERY 12 HOURS PRN
Qty: 30 TABLET | Refills: 0 | Status: SHIPPED | OUTPATIENT
Start: 2020-09-09 | End: 2020-10-09

## 2020-09-09 NOTE — Clinical Note
Ms Fraser saw you in the past for cystic breast disease. She notes that her sister just was diagnosed with breast ca at age 50. I ordered a new mammogram and included the information about the FH; will this be incorporated into the review by the Radiologist or do I need to get her in for a new visit with you? Thanks! --Alireza

## 2020-09-09 NOTE — PROGRESS NOTES
Subjective:       Patient ID: Kellen Fraser is a 45 y.o. female.    Chief Complaint: Annual Exam      Last visit with me 7/23/2019.  Was seen in May by orthopedics for muscle spasm and neck pain.  Has upcoming orthopedics appointment.    HPI    headache off and on on the R side. Going on a few days at a time, used to be only 1 day. About every 2 weeks. Located behind the eye and sinus, and upper head and to the neck. Takes Mobic and seems to help, then tried Fioricet when headache was really bad.     Reviewed PMH, PSH, SH, FH, allergies, and medications.     Review of Systems   Constitutional: Negative for activity change and unexpected weight change.   HENT: Negative for hearing loss, rhinorrhea and trouble swallowing.    Eyes: Negative for discharge and visual disturbance.   Respiratory: Negative for chest tightness and wheezing.    Cardiovascular: Negative for chest pain and palpitations.   Gastrointestinal: Negative for blood in stool, constipation, diarrhea and vomiting.   Endocrine: Negative for polydipsia and polyuria.   Genitourinary: Negative for difficulty urinating, dysuria, hematuria and menstrual problem.   Musculoskeletal: Positive for arthralgias. Negative for joint swelling and neck pain.   Neurological: Positive for headaches. Negative for weakness.   Psychiatric/Behavioral: Negative for confusion and dysphoric mood.       Objective:      Physical Exam  Vitals signs and nursing note reviewed.   Constitutional:       General: She is not in acute distress.     Appearance: She is not diaphoretic.   HENT:      Head: Atraumatic.      Right Ear: External ear normal.      Left Ear: External ear normal.   Eyes:      General:         Right eye: No discharge.         Left eye: No discharge.      Pupils: Pupils are equal, round, and reactive to light.   Neck:      Musculoskeletal: Normal range of motion.      Thyroid: No thyromegaly.   Cardiovascular:      Rate and Rhythm: Normal rate and regular rhythm.       "Heart sounds: Normal heart sounds.   Pulmonary:      Effort: Pulmonary effort is normal.      Breath sounds: Normal breath sounds. No stridor. No wheezing or rales.   Abdominal:      Palpations: Abdomen is soft.      Tenderness: There is no abdominal tenderness. There is no guarding.   Musculoskeletal:         General: No tenderness.      Right lower leg: No edema.      Left lower leg: No edema.   Lymphadenopathy:      Cervical: No cervical adenopathy.   Skin:     General: Skin is warm and dry.      Findings: No rash.   Neurological:      General: No focal deficit present.      Mental Status: She is alert and oriented to person, place, and time.   Psychiatric:         Mood and Affect: Mood normal.         Behavior: Behavior normal.         Vitals:    09/09/20 1518   BP: 124/86   BP Location: Right arm   Patient Position: Sitting   BP Method: Medium (Manual)   Pulse: 80   SpO2: 96%   Weight: 108.9 kg (240 lb)   Height: 5' 4" (1.626 m)     Body mass index is 41.2 kg/m².    RESULTS: Reviewed labs from last 12 months    Assessment:       1. Annual physical exam    2. Right-sided headache    3. Post-surgical hypothyroidism    4. Uterine leiomyoma, unspecified location    5. Encounter for screening mammogram for breast cancer    6. Family history of breast cancer        Plan:   Kellen was seen today for annual exam.    Diagnoses and all orders for this visit:    Annual physical exam:  Age-appropriate health screening reviewed, indicated tests ordered.   -     TSH; Future  -     HIV 1/2 Ag/Ab (4th Gen); Future  -     Hepatitis C Antibody; Future    Right-sided headache:  Longstanding problem but getting worse. Has history of migraine headaches. possibly migraine, also may be related to R side neck pain. See Optometry and Orthopedics, if symptoms persist please notify the office.  -     Discontinue: butalbital-acetaminophen-caffeine -40 mg (FIORICET, ESGIC) -40 mg per tablet; Take 1 tablet by mouth every 12 " (twelve) hours as needed for Headaches.  -     butalbital-acetaminophen-caffeine -40 mg (FIORICET, ESGIC) -40 mg per tablet; Take 1 tablet by mouth every 12 (twelve) hours as needed for Headaches.    Post-surgical hypothyroidism:  Prior diagnosis, has been consistent with medications but TSH increasing, increase the thyroid dose and recheck 6-8 weeks.  -     levothyroxine (SYNTHROID) 200 MCG tablet; Take 1 tablet (200 mcg total) by mouth before breakfast.    Uterine leiomyoma, unspecified location:  Prior diagnosis, needs well woman exam as well, refer to Gynecology.  -     Ambulatory referral/consult to Gynecology; Future    Encounter for screening mammogram for breast cancer  -     Mammo Digital Screening Bilat; Future    Family history of breast cancer  -     Mammo Digital Screening Bilat; Future    Follow up in about 1 year (around 9/9/2021) for EPP annual exam, fasting labs 1 week prior.  Alireza Smith MD  Internal Medicine    Portions of this note were completed using medical dictation software. Please excuse typographical or syntax errors that were missed on review.

## 2020-09-09 NOTE — PATIENT INSTRUCTIONS
Use Fioricet as needed for persistent headache. You can also take Tylenol along with Mobic. Please see the Orthopedics and Optometry teams as planned. If their workup is normal and symptoms persist, please notify the office.

## 2020-09-25 NOTE — PROGRESS NOTES
Subjective:      Patient ID: Kellen Fraser is a 45 y.o. female.    Chief Complaint: Low-back Pain    Ms Fraser is a 46 yo female here for follow up of neck and right arm pain.  She was last seen by me on 5/27/2020 and she was doing better after trigger point injections in January. She had the pain since July 21.  She was standing and cooking and then she felt like she was punched and took her breath away.  She was sent to PT and for cervical JONATHAN done 9/10/2019.  She is having more right upper back pain and pain around shoulder blade.  She feels like the spasms are coming back for the past 2 months.  She has a new pain in the middle of her upper back for 3 weeks that hurts with bending.  It feels like a bruise.  It has not gotten better it has not gotten worse.  It is only with bending.  The pain is 3/10 now, worst 4/10 with bending, best 3/10.  She has not had the right sided spasms this week.  She has been having headaches that start at right eye and go to the neck.  Pain is all on the right side.  She feels like they start on face and go to neck.      MRI cervical 8/6/2019  The visualized portions of the posterior fossa is unremarkable.  The craniocervical junction is within normal limits.  No prevertebral soft tissue swelling is identified.    The cervical alignment is maintained.  The bone marrow signal is within normal limits.  The vertebral body heights are maintained.    The cord is normal in signal without evidence of edema or expansion.  There are no intraspinal collections.  There is no evidence of mass effect on the cord.    There is multilevel disc desiccation.  Evaluation of the individual disc levels reveals the following:    C2-C3, there is a disc osteophyte complex along with facet hypertrophy and uncovertebral hypertrophy.  The spinal canal and neural foramina are unremarkable.    C3-C4, there is a disc osteophyte complex along with facet hypertrophy and uncovertebral hypertrophy.  There is  superimposed central disc protrusion.  The spinal canal is within normal limits.  There is mild bilateral neural foraminal narrowing.    C4-C5, there is a disc osteophyte complex along with facet hypertrophy and uncovertebral hypertrophy.  There is superimposed central disc protrusion.  There is mild narrowing the spinal canal.  The neural foramina is unremarkable.    C5-C6, there is a disc osteophyte complex along with facet hypertrophy and uncovertebral hypertrophy.  There is superimposed central disc protrusion.  There is mild spinal canal narrowing.  There is mild bilateral neural foraminal narrowing.    C6-C7, there is a disc osteophyte complex along with facet hypertrophy and uncovertebral hypertrophy.  There is superimposed central disc protrusion.  There is mild narrowing of the spinal canal.  There is mild bilateral neural foraminal narrowing.    C7-T1, there is a disc osteophyte complex along with facet hypertrophy and uncovertebral hypertrophy.  The spinal canal and neural foramina are unremarkable.    The flow voids within the vertebral arteries are unremarkable.  The paraspinal soft tissues are within normal limits.    There is no evidence of abnormal enhancement following intravenous contrast administration.    Impression      Mild multilevel degenerative changes in the cervical spine most prominent at C5-C6 and C6-C7 levels.  No evidence of significant spinal canal or neural foraminal narrowing.    No evidence of abnormal enhancement.    X-ray cervical 7/23/2019  Mild DJD.  The C5/C6 disc space is narrowed.  The other disc spaces are well maintained.  No fracture or dislocation.  No bone destruction identified.    X-ray thoracic 7/23/2019  Vertebral body heights are well maintained.  No evidence of fracture or dislocation.  Osseous structures demonstrate no destructive lesions.    Intervertebral disc spaces are mildly narrowed with minimal endplate sclerosis visualized throughout the thoracic spine.   No other significant degenerative changes.    Surgical clips in the right upper abdomen.    The visualized lung bases are unremarkable.    Impression      No fracture or dislocation.  Mild degenerative changes as above.    Past Medical History:  11/2014: BRCA negative  No date: Cervical disc disease      Comment:  C5-C6, impacting thecal sac  No date: Gallbladder sludge  No date: Hypothyroidism  No date: Thyroid cancer  No date: Uterine fibroid    Past Surgical History:  No date: BREAST BIOPSY; Right      Comment:  exc bx  2012: CHOLECYSTECTOMY  No date: FOOT SURGERY; Right  2004: THYROIDECTOMY    Review of patient's family history indicates:  Problem: No Known Problems      Relation: Mother          Age of Onset: (Not Specified)  Problem: Hypertension      Relation: Father          Age of Onset: (Not Specified)  Problem: Diabetes      Relation: Father          Age of Onset: (Not Specified)  Problem: Thyroid cancer      Relation: Sister          Age of Onset: (Not Specified)  Problem: Hodgkin's lymphoma      Relation: Sister          Age of Onset: (Not Specified)  Problem: Cancer      Relation: Sister          Age of Onset: (Not Specified)          Comment: Hodgkin's, thyroid  Problem: Leukemia      Relation: Brother          Age of Onset: (Not Specified)  Problem: Cancer      Relation: Brother          Age of Onset: (Not Specified)          Comment: Acute leukemia  Problem: Cancer      Relation: Maternal Grandmother          Age of Onset: (Not Specified)          Comment: Breast  Problem: Diabetes      Relation: Maternal Grandmother          Age of Onset: (Not Specified)  Problem: Breast cancer      Relation: Maternal Grandmother          Age of Onset: 70  Problem: Heart disease      Relation: Maternal Grandfather          Age of Onset: (Not Specified)  Problem: Cancer      Relation: Paternal Grandfather          Age of Onset: (Not Specified)          Comment: Lymphoma  Problem: Ovarian cancer      Relation: Neg  Hx          Age of Onset: (Not Specified)  Problem: Melanoma      Relation: Neg Hx          Age of Onset: (Not Specified)      Social History    Socioeconomic History      Marital status:       Spouse name: Not on file      Number of children: Not on file      Years of education: Not on file      Highest education level: Not on file    Occupational History      Not on file    Social Needs      Financial resource strain: Not on file      Food insecurity:        Worry: Not on file        Inability: Not on file      Transportation needs:        Medical: Not on file        Non-medical: Not on file    Tobacco Use      Smoking status: Never Smoker      Smokeless tobacco: Never Used    Substance and Sexual Activity      Alcohol use: Yes        Comment: socially      Drug use: No      Sexual activity: Yes    Lifestyle      Physical activity:        Days per week: Not on file        Minutes per session: Not on file      Stress: Not on file    Relationships      Social connections:        Talks on phone: Not on file        Gets together: Not on file        Attends [employer] service: Not on file        Active member of club or organization: Not on file        Attends meetings of clubs or organizations: Not on file        Relationship status: Not on file    Other Topics      Concerns:        Are you pregnant or think you may be?: Not Asked        Breast-feeding: Not Asked    Social History Narrative      Not on file      Current Outpatient Medications:  gabapentin (NEURONTIN) 300 MG capsule, Take 1 capsule (300 mg total) by mouth 3 (three) times daily as needed (burning pain)., Disp: 30 capsule, Rfl: 3  levothyroxine (SYNTHROID, LEVOTHROID) 175 MCG tablet, TAKE 1 TABLET BY MOUTH ONCE DAILY, Disp: 90 tablet, Rfl: 3  meloxicam (MOBIC) 15 MG tablet, Take 1 tablet (15 mg total) by mouth daily as needed for Pain., Disp: 90 tablet, Rfl: 3  methocarbamol (ROBAXIN) 500 MG Tab, Take 1 tablet (500 mg total) by mouth 4 (four) times  daily., Disp: 120 tablet, Rfl: 2  methylPREDNISolone (MEDROL DOSEPACK) 4 mg tablet, use as directed, Disp: 1 Package, Rfl: 0    Current Facility-Administered Medications:  0.9%  NaCl infusion, , Intravenous, 1 time in Clinic/HOD, Alireza Smith MD  aluminum & magnesium hydroxide-simethicone 400-400-40 mg/5 mL suspension 30 mL, 30 mL, Oral, Q6H PRN, Aileen Vargas, ELEANOR, 30 mL at 01/07/19 1049        Review of patient's allergies indicates:   -- Morphine -- Shortness Of Breath   -- Bactrim (sulfamethoxazole-trimethoprim) -- Nausea And Vomiting   -- Demerol (meperidine) -- Hives   -- Dilaudid (hydromorphone (bulk)) -- Hives   -- Erythromycin -- Hives   -- Influenza virus vaccines -- Hives   -- Penicillins -- Hives   -- Reglan (metoclopramide) -- Nausea And Vomiting   -- Toradol (ketorolac) -- Nausea And Vomiting        Review of Systems   Constitution: Negative for weight gain and weight loss.   Cardiovascular: Negative for chest pain.   Respiratory: Negative for shortness of breath.    Musculoskeletal: Positive for back pain (right upper backand mid back) and neck pain (right neck). Negative for joint pain and joint swelling.   Gastrointestinal: Negative for abdominal pain, bowel incontinence, nausea and vomiting.   Genitourinary: Negative for bladder incontinence.   Neurological: Positive for headaches (right). Negative for numbness and paresthesias.         Objective:        General: Kellen is well-developed, well-nourished, appears stated age, in no acute distress, alert and oriented to time, place and person.     General    Vitals reviewed.  Constitutional: She is oriented to person, place, and time. She appears well-developed and well-nourished.   HENT:   Head: Normocephalic and atraumatic.   Pulmonary/Chest: Effort normal.   Neurological: She is alert and oriented to person, place, and time.   Psychiatric: She has a normal mood and affect. Her behavior is normal. Judgment and thought content normal.     General  Musculoskeletal Exam   Gait: normal     Right Ankle/Foot Exam     Tests   Heel Walk: able to perform  Tiptoe Walk: able to perform    Left Ankle/Foot Exam     Tests   Heel Walk: able to perform  Tiptoe Walk: able to perform  Back (L-Spine & T-Spine) / Neck (C-Spine) Exam     Tenderness   The patient is tender to palpation of the right trapezial and right scapular. Right paramedian tenderness of the Lower T-Spine, Upper T-Spine, Lower C-Spine and Upper C-Spine. Left paramedian tenderness of the Upper T-Spine.     Back (L-Spine & T-Spine) Range of Motion   Extension: 10   Flexion: 50     Neck (C-Spine) Range of Motion   Flexion:     40  Extension: 40Right Lateral Bend: 20 Left Lateral Bend: 20 Right Rotation: 40 Left Rotation: 40     Spinal Sensation   Right Side Sensation  C-Spine Level: normal   L-Spine Level: normal  S-Spine Level: normal  Left Side Sensation  C-Spine Level: normal  L-Spine Level: normal  S-Spine Level: normal    Back (L-Spine & T-Spine) Tests   Right Side Tests  Straight leg raise:      Sitting SLR: > 70 degrees      Left Side Tests  Straight leg raise:     Sitting SLR: > 70 degrees          Other She has no scoliosis .  Spinal Kyphosis:  Absent    Comments:  Trigger point right infraspinatus and right inferior edge of scapula      Muscle Strength   Right Upper Extremity   Biceps: 5/5   Deltoid:  5/5  Triceps:  5/5  Wrist extension: 5/5   Finger Flexors:  5/5  Left Upper Extremity  Biceps: 5/5   Deltoid:  5/5  Triceps:  5/5  Wrist extension: 5/5   Finger Flexors:  5/5  Right Lower Extremity   Hip Flexion: 5/5   Quadriceps:  5/5   Anterior tibial:  5/5   EHL:  5/5  Left Lower Extremity   Hip Flexion: 5/5   Quadriceps:  5/5   Anterior tibial:  5/5   EHL:  5/5    Reflexes     Left Side  Biceps:  2+  Triceps:  2+  Brachioradialis:  2+  Achilles:  2+  Left Clifton's Sign:  Absent  Babinski Sign:  absent  Quadriceps:  2+    Right Side   Biceps:  2+  Triceps:  2+  Brachioradialis:  2+  Achilles:   2+  Right Clifton's Sign:  absent  Babinski Sign:  absent  Quadriceps:  2+    Vascular Exam     Right Pulses        Carotid:                  2+    Left Pulses        Carotid:                  2+              Assessment:       1. Chronic right-sided thoracic back pain    2. Muscle spasm    3. Neck pain    4. DDD (degenerative disc disease), cervical    5. Osteoarthritis of spine with radiculopathy, cervical region    6. Occipital neuralgia of right side           Plan:       Orders Placed This Encounter    methocarbamoL (ROBAXIN) 500 MG Tab    Procedure Order to Pain Management      1.  C7-T1 intralaminar JONATHAN with pain management 9/21/2019 helped some, trigger point injections, right levator scapula, rhomboid  and serratus anterior 1/31/20  also helped.  We discussed repeating the JONATHAN if need it will try occipital nerve block first  2.  We discussed occipital nerve block on right with pain management. To help her right sided headaches.    3.  Robaxin 500mg po QID, encourage her to take more, she does not feel like it makes her sleepy  4.  Continue PT HEP, and working on posture.     5. Gabapentin 300mg up to 3 a day  6.  mobic 15mg po Qday as needed  7. discussed midline upper back pain is muscle.  There is tenderness.  I encourage her to watch ehr posture and try the muscle relaxer  8.  RTC 4 months, she can cancel if feeling good      Follow-up: Follow up in about 4 months (around 1/28/2021). If there are any questions prior to this, the patient was instructed to contact the office.

## 2020-09-28 ENCOUNTER — OFFICE VISIT (OUTPATIENT)
Dept: SPINE | Facility: CLINIC | Age: 45
End: 2020-09-28
Attending: PHYSICAL MEDICINE & REHABILITATION
Payer: OTHER GOVERNMENT

## 2020-09-28 VITALS
HEIGHT: 64 IN | DIASTOLIC BLOOD PRESSURE: 57 MMHG | BODY MASS INDEX: 40.98 KG/M2 | SYSTOLIC BLOOD PRESSURE: 117 MMHG | HEART RATE: 91 BPM | WEIGHT: 240.06 LBS

## 2020-09-28 DIAGNOSIS — M62.838 MUSCLE SPASM: ICD-10-CM

## 2020-09-28 DIAGNOSIS — G89.29 CHRONIC RIGHT-SIDED THORACIC BACK PAIN: Primary | ICD-10-CM

## 2020-09-28 DIAGNOSIS — M50.30 DDD (DEGENERATIVE DISC DISEASE), CERVICAL: ICD-10-CM

## 2020-09-28 DIAGNOSIS — M54.6 CHRONIC RIGHT-SIDED THORACIC BACK PAIN: Primary | ICD-10-CM

## 2020-09-28 DIAGNOSIS — M54.81 OCCIPITAL NEURALGIA OF RIGHT SIDE: ICD-10-CM

## 2020-09-28 DIAGNOSIS — M47.22 OSTEOARTHRITIS OF SPINE WITH RADICULOPATHY, CERVICAL REGION: ICD-10-CM

## 2020-09-28 DIAGNOSIS — M54.2 NECK PAIN: ICD-10-CM

## 2020-09-28 PROCEDURE — 99214 PR OFFICE/OUTPT VISIT, EST, LEVL IV, 30-39 MIN: ICD-10-PCS | Mod: S$PBB,,, | Performed by: PHYSICAL MEDICINE & REHABILITATION

## 2020-09-28 PROCEDURE — 99999 PR PBB SHADOW E&M-EST. PATIENT-LVL III: CPT | Mod: PBBFAC,,, | Performed by: PHYSICAL MEDICINE & REHABILITATION

## 2020-09-28 PROCEDURE — 99999 PR PBB SHADOW E&M-EST. PATIENT-LVL III: ICD-10-PCS | Mod: PBBFAC,,, | Performed by: PHYSICAL MEDICINE & REHABILITATION

## 2020-09-28 PROCEDURE — 99213 OFFICE O/P EST LOW 20 MIN: CPT | Mod: PBBFAC | Performed by: PHYSICAL MEDICINE & REHABILITATION

## 2020-09-28 PROCEDURE — 99214 OFFICE O/P EST MOD 30 MIN: CPT | Mod: S$PBB,,, | Performed by: PHYSICAL MEDICINE & REHABILITATION

## 2020-09-28 RX ORDER — METHOCARBAMOL 500 MG/1
500 TABLET, FILM COATED ORAL 4 TIMES DAILY
Qty: 120 TABLET | Refills: 2 | Status: SHIPPED | OUTPATIENT
Start: 2020-09-28

## 2020-09-30 ENCOUNTER — TELEPHONE (OUTPATIENT)
Dept: PAIN MEDICINE | Facility: CLINIC | Age: 45
End: 2020-09-30

## 2020-09-30 ENCOUNTER — PATIENT MESSAGE (OUTPATIENT)
Dept: PAIN MEDICINE | Facility: CLINIC | Age: 45
End: 2020-09-30

## 2020-09-30 DIAGNOSIS — M54.81 OCCIPITAL NEURALGIA OF RIGHT SIDE: Primary | ICD-10-CM

## 2020-10-05 ENCOUNTER — PATIENT MESSAGE (OUTPATIENT)
Dept: ADMINISTRATIVE | Facility: HOSPITAL | Age: 45
End: 2020-10-05

## 2020-10-06 ENCOUNTER — TELEPHONE (OUTPATIENT)
Dept: SPINE | Facility: CLINIC | Age: 45
End: 2020-10-06

## 2020-10-06 NOTE — TELEPHONE ENCOUNTER
----- Message from Marisa Gaspar MA sent at 10/6/2020  4:47 PM CDT -----    ----- Message -----  From: Trish Charles  Sent: 10/6/2020   4:34 PM CDT  To: Sourav Zuniga    Good Afternoon,     Authorization for this pt's procedure for 10-8 has been denied by [employer]. I called to get a denial reason as well as appeal info. Their office is currently unavailable to take my wilfred, I left a voicemail for a return call back

## 2020-10-27 ENCOUNTER — HOSPITAL ENCOUNTER (OUTPATIENT)
Dept: RADIOLOGY | Facility: HOSPITAL | Age: 45
Discharge: HOME | End: 2020-10-27
Attending: INTERNAL MEDICINE
Payer: OTHER GOVERNMENT

## 2020-10-27 DIAGNOSIS — Z12.31 ENCOUNTER FOR SCREENING MAMMOGRAM FOR BREAST CANCER: ICD-10-CM

## 2020-10-27 DIAGNOSIS — Z80.3 FAMILY HISTORY OF BREAST CANCER: ICD-10-CM

## 2020-10-27 DIAGNOSIS — N63.20 LEFT BREAST LUMP: ICD-10-CM

## 2020-10-28 ENCOUNTER — PATIENT MESSAGE (OUTPATIENT)
Dept: INTERNAL MEDICINE | Facility: CLINIC | Age: 45
End: 2020-10-28

## 2020-10-28 NOTE — TELEPHONE ENCOUNTER
Please call the patient to notify that the thyroid hormone is now too much on the new levothyroxine dose. I think we need to discuss more directly to figure out next steps. Please see if she is having any symptoms of hyperthyroidism, including sweating, tremor, palpitations, or anxiety. If not then please have her continue the same dose for now and schedule her for a Virtual Visit.

## 2020-11-02 ENCOUNTER — PATIENT OUTREACH (OUTPATIENT)
Dept: ADMINISTRATIVE | Facility: OTHER | Age: 45
End: 2020-11-02

## 2020-11-02 NOTE — PROGRESS NOTES
Care Everywhere: updated  Immunization:   Health Maintenance: updated  Media Review:   Legacy Review:   Order placed:   Upcoming appts: mammogram 11/3

## 2020-11-03 ENCOUNTER — OFFICE VISIT (OUTPATIENT)
Dept: OBSTETRICS AND GYNECOLOGY | Facility: CLINIC | Age: 45
End: 2020-11-03
Payer: OTHER GOVERNMENT

## 2020-11-03 ENCOUNTER — HOSPITAL ENCOUNTER (OUTPATIENT)
Dept: RADIOLOGY | Facility: HOSPITAL | Age: 45
Discharge: HOME | End: 2020-11-03
Attending: INTERNAL MEDICINE
Payer: OTHER GOVERNMENT

## 2020-11-03 VITALS
DIASTOLIC BLOOD PRESSURE: 74 MMHG | BODY MASS INDEX: 40.91 KG/M2 | WEIGHT: 239.63 LBS | HEIGHT: 64 IN | SYSTOLIC BLOOD PRESSURE: 132 MMHG

## 2020-11-03 DIAGNOSIS — N63.20 LEFT BREAST LUMP: ICD-10-CM

## 2020-11-03 DIAGNOSIS — N93.9 ABNORMAL UTERINE BLEEDING (AUB): ICD-10-CM

## 2020-11-03 DIAGNOSIS — Z12.4 SCREENING FOR CERVICAL CANCER: ICD-10-CM

## 2020-11-03 DIAGNOSIS — D25.9 UTERINE LEIOMYOMA, UNSPECIFIED LOCATION: Primary | ICD-10-CM

## 2020-11-03 DIAGNOSIS — N64.4 BREAST PAIN: ICD-10-CM

## 2020-11-03 PROCEDURE — 77066 DX MAMMO INCL CAD BI: CPT | Mod: TC

## 2020-11-03 PROCEDURE — 76642 ULTRASOUND BREAST LIMITED: CPT | Mod: TC,LT

## 2020-11-03 PROCEDURE — 87624 HPV HI-RISK TYP POOLED RSLT: CPT

## 2020-11-03 PROCEDURE — 76642 US BREAST LEFT LIMITED: ICD-10-PCS | Mod: 26,LT,, | Performed by: RADIOLOGY

## 2020-11-03 PROCEDURE — 88175 CYTOPATH C/V AUTO FLUID REDO: CPT

## 2020-11-03 PROCEDURE — 77066 DX MAMMO INCL CAD BI: CPT | Mod: 26,,, | Performed by: RADIOLOGY

## 2020-11-03 PROCEDURE — 99203 OFFICE O/P NEW LOW 30 MIN: CPT | Mod: S$PBB,,, | Performed by: OBSTETRICS & GYNECOLOGY

## 2020-11-03 PROCEDURE — 77062 BREAST TOMOSYNTHESIS BI: CPT | Mod: 26,,, | Performed by: RADIOLOGY

## 2020-11-03 PROCEDURE — 99999 PR PBB SHADOW E&M-EST. PATIENT-LVL III: ICD-10-PCS | Mod: PBBFAC,,, | Performed by: OBSTETRICS & GYNECOLOGY

## 2020-11-03 PROCEDURE — 99203 PR OFFICE/OUTPT VISIT, NEW, LEVL III, 30-44 MIN: ICD-10-PCS | Mod: S$PBB,,, | Performed by: OBSTETRICS & GYNECOLOGY

## 2020-11-03 PROCEDURE — 77066 MAMMO DIGITAL DIAGNOSTIC BILAT WITH TOMO: ICD-10-PCS | Mod: 26,,, | Performed by: RADIOLOGY

## 2020-11-03 PROCEDURE — 99999 PR PBB SHADOW E&M-EST. PATIENT-LVL III: CPT | Mod: PBBFAC,,, | Performed by: OBSTETRICS & GYNECOLOGY

## 2020-11-03 PROCEDURE — 99213 OFFICE O/P EST LOW 20 MIN: CPT | Mod: PBBFAC,25 | Performed by: OBSTETRICS & GYNECOLOGY

## 2020-11-03 PROCEDURE — 76642 ULTRASOUND BREAST LIMITED: CPT | Mod: 26,LT,, | Performed by: RADIOLOGY

## 2020-11-03 PROCEDURE — 77062 MAMMO DIGITAL DIAGNOSTIC BILAT WITH TOMO: ICD-10-PCS | Mod: 26,,, | Performed by: RADIOLOGY

## 2020-11-03 NOTE — PROGRESS NOTES
Subjective:       Patient ID: Kellen Fraser is a 45 y.o. female.    Chief Complaint:  Consult      History of Present Illness  Gynecologic Exam  The patient's pertinent negatives include no genital itching, genital lesions, genital odor, genital rash, missed menses, pelvic pain, vaginal bleeding or vaginal discharge. She is not pregnant. Pertinent negatives include no abdominal pain, anorexia, back pain, chills, constipation, diarrhea, discolored urine, dysuria, fever, flank pain, frequency, headaches, hematuria, nausea, painful intercourse, rash, urgency or vomiting. She has not been passing tissue. She is sexually active. No, her partner does not have an STD. She uses vasectomy for contraception. Her menstrual history has been regular. Her past medical history is significant for menorrhagia. There is no history of an abdominal surgery, a  section, an ectopic pregnancy, endometriosis, a gynecological surgery, herpes simplex, metrorrhagia, miscarriage, ovarian cysts, perineal abscess, PID, an STD, a terminated pregnancy or vaginosis.        46 y/o  presents for consult for uterine fibroids. Moved here from [place] in . In  she had a CT that incidentally noted the fibroids. She had an US in  that showed several small fibroids. Cycles are regular, lasting 5 days, heaviest on day 2-3 with passage of clots. Some dysmenorhea. Migraines prior to menses. Overall menses not bothersome. Last pap smear >5 years ago. No history of abnormal pap smears.     GYN & OB History  Patient's last menstrual period was 10/12/2020.   Date of Last Pap: 11/3/2020    OB History    Para Term  AB Living   3 3 3         SAB TAB Ectopic Multiple Live Births                  # Outcome Date GA Lbr Lv/2nd Weight Sex Delivery Anes PTL Lv   3 Term            2 Term            1 Term                Review of Systems  Review of Systems   Constitutional: Negative for chills, diaphoresis, fatigue and fever.    Respiratory: Negative for cough and shortness of breath.    Cardiovascular: Negative for chest pain and palpitations.   Gastrointestinal: Negative for abdominal pain, anorexia, constipation, diarrhea, nausea and vomiting.   Genitourinary: Positive for dysmenorrhea and menorrhagia. Negative for dyspareunia, dysuria, flank pain, frequency, hematuria, menstrual problem, missed menses, pelvic pain, urgency, vaginal bleeding, vaginal discharge, vaginal pain and postcoital bleeding.   Musculoskeletal: Negative for back pain.   Integumentary:  Negative for rash.   Neurological: Negative for headaches.   Psychiatric/Behavioral: Negative for depression. The patient is not nervous/anxious.            Objective:    Physical Exam:   Constitutional: She is oriented to person, place, and time. She appears well-developed and well-nourished. No distress.    HENT:   Head: Normocephalic and atraumatic.    Eyes: EOM are normal.    Neck: Normal range of motion.     Pulmonary/Chest: Effort normal.          Genitourinary:    Genitourinary Comments: Normal external female genitalia; vagina rugated, normal; cervix normal, no masses; uterus difficult to palpate 2/2 body habitus but small mobile nontender; no adnexal masses palpated; rectal deferred               Musculoskeletal: Normal range of motion and moves all extremeties.       Neurological: She is alert and oriented to person, place, and time.    Skin: No rash noted.    Psychiatric: She has a normal mood and affect. Her behavior is normal. Judgment and thought content normal.          Assessment:        1. Uterine leiomyoma, unspecified location    2. Screening for cervical cancer    3. Abnormal uterine bleeding (AUB)              Plan:      Kellen was seen today for consult.    Diagnoses and all orders for this visit:    Uterine leiomyoma, unspecified location  -     Ambulatory referral/consult to Gynecology  -     US Pelvis Comp with Transvag NON-OB (xpd); Future  No imaging to  review but last imaging was >5 years ago. Will obtain pelvic US.   Discussed management of menstrual cycles/fibroids including medical management and surgical management. Patient declines management at this time, she would just like to assess fibroids.     Screening for cervical cancer  -     Liquid-Based Pap Smear, Screening  -     HPV High Risk Genotypes, PCR  Declines HPV vaccine.     Abnormal uterine bleeding (AUB)  -     US Pelvis Comp with Transvag NON-OB (xpd); Future    Orders Placed This Encounter   Procedures    HPV High Risk Genotypes, PCR    US Pelvis Comp with Transvag NON-OB (xpd)       Follow up if symptoms worsen or fail to improve.

## 2020-11-03 NOTE — LETTER
November 3, 2020      Alireza Smith MD  [address]  [facility] [address]           [facility] [address]  [address]  [facility] [address]  Phone: [phone]  Fax: [phone]          Patient: Kellen Fraser   MR Number: [id number]   YOB: 1975   Date of Visit: 11/3/2020       Dear Dr. Alireza Smith:    Thank you for referring Kellen Fraser to me for evaluation. Attached you will find relevant portions of my assessment and plan of care.    If you have questions, please do not hesitate to call me. I look forward to following Kellen Fraser along with you.    Sincerely,    Mara Linares MD    Enclosure  CC:  No Recipients    If you would like to receive this communication electronically, please contact [email or web address] or [phone] to request more information on [employer] [employer] access.    For providers and/or their staff who would like to refer a patient to [facility], please contact us through our one-stop-shop provider referral line, [facility], at [phone].    If you feel you have received this communication in error or would no longer like to receive these types of communications, please e-mail [email or web address]

## 2020-11-06 ENCOUNTER — OFFICE VISIT (OUTPATIENT)
Dept: INTERNAL MEDICINE | Facility: CLINIC | Age: 45
End: 2020-11-06
Payer: OTHER GOVERNMENT

## 2020-11-06 DIAGNOSIS — E89.0 POST-SURGICAL HYPOTHYROIDISM: Primary | ICD-10-CM

## 2020-11-06 DIAGNOSIS — R05.9 COUGH: ICD-10-CM

## 2020-11-06 PROCEDURE — 99213 OFFICE O/P EST LOW 20 MIN: CPT | Mod: 95,,, | Performed by: INTERNAL MEDICINE

## 2020-11-06 PROCEDURE — 99213 PR OFFICE/OUTPT VISIT, EST, LEVL III, 20-29 MIN: ICD-10-PCS | Mod: 95,,, | Performed by: INTERNAL MEDICINE

## 2020-11-06 NOTE — PROGRESS NOTES
VIRTUAL VIDEO VISIT PROGRESS NOTE    Subjective:       Patient ID: Kellen Fraser is a 45 y.o. female.    Chief Complaint: Hypothyroidism    Patient is being seen via Virtual Visit.     HPI    Last visit with me 9/9, TSH was increasing so increased the levothyroxine but last week's labs showed that the TSH was low and the free T4 was high.    Patient denies any symptoms of hyperthyroidism.  She has not changed the way that she takes the thyroid hormone medication.    The patient does note that over the last few days she notices a dry cough when taking a deep breath.  No wheezing.  Not productive.  Thinks it may be related to the weather change.    Review of Systems   Constitutional: Negative for activity change and unexpected weight change.   HENT: Negative for hearing loss, rhinorrhea and trouble swallowing.    Eyes: Negative for discharge and visual disturbance.   Respiratory: Negative for chest tightness and wheezing.    Cardiovascular: Negative for chest pain and palpitations.   Gastrointestinal: Negative for blood in stool, constipation, diarrhea and vomiting.   Endocrine: Negative for polydipsia and polyuria.   Genitourinary: Negative for difficulty urinating, dysuria, hematuria and menstrual problem.   Musculoskeletal: Negative for arthralgias, joint swelling and neck pain.   Neurological: Positive for headaches. Negative for weakness.   Psychiatric/Behavioral: Negative for confusion and dysphoric mood.       Objective:      Physical Exam  Constitutional:       General: She is not in acute distress.     Appearance: She is not diaphoretic.   Pulmonary:      Effort: Pulmonary effort is normal. No respiratory distress.      Comments: occasionally dry cough during discussion   Skin:     Findings: No rash.   Neurological:      Mental Status: She is alert and oriented to person, place, and time.   Psychiatric:         Mood and Affect: Mood normal.         Behavior: Behavior normal.         RESULTS: Reviewed labs  from last 3 months    Assessment:       1. Post-surgical hypothyroidism    2. Cough        Plan:   Kellen was seen today for hypothyroidism.    Diagnoses and all orders for this visit:    Post-surgical hypothyroidism:  Prior diagnosis, wasn't well controlled on levothyroxine 175, but now appears overtreated on 200 mcg. Recheck again in 8 weeks, if numbers the same restart 175 and consider doubling dose 1 or 2 days/wk.    Cough:  New problem, not severe, possibly dryness or allergies. Try over-the-counter saline or antihistamine as needed. Please notify the office if the symptoms persist or worsen.       Total time spent with patient: 15 minutes    No follow-ups on file.  Alireza Smith MD, FACP [facility]    Portions of this note were completed using medical dictation software. Please excuse typographical or syntax errors that were missed on review.      The patient location is: home ([place])  Visit type: Virtual visit with synchronous audio and video  Each patient to whom he or she provides medical services by telemedicine is:  (1) informed of the relationship between the physician and patient and the respective role of any other health care provider with respect to management of the patient; and (2) notified that he or she may decline to receive medical services by telemedicine and may withdraw from such care at any time.

## 2020-11-10 ENCOUNTER — HOSPITAL ENCOUNTER (OUTPATIENT)
Dept: RADIOLOGY | Facility: OTHER | Age: 45
Discharge: HOME | End: 2020-11-10
Attending: OBSTETRICS & GYNECOLOGY
Payer: OTHER GOVERNMENT

## 2020-11-10 DIAGNOSIS — N93.9 ABNORMAL UTERINE BLEEDING (AUB): ICD-10-CM

## 2020-11-10 DIAGNOSIS — D25.9 UTERINE LEIOMYOMA, UNSPECIFIED LOCATION: ICD-10-CM

## 2020-11-10 PROCEDURE — 76830 TRANSVAGINAL US NON-OB: CPT | Mod: 26,,, | Performed by: RADIOLOGY

## 2020-11-10 PROCEDURE — 76856 US EXAM PELVIC COMPLETE: CPT | Mod: 26,,, | Performed by: RADIOLOGY

## 2020-11-10 PROCEDURE — 76830 US PELVIS COMP WITH TRANSVAG NON-OB (XPD): ICD-10-PCS | Mod: 26,,, | Performed by: RADIOLOGY

## 2020-11-10 PROCEDURE — 76830 TRANSVAGINAL US NON-OB: CPT | Mod: TC

## 2020-11-10 PROCEDURE — 76856 US PELVIS COMP WITH TRANSVAG NON-OB (XPD): ICD-10-PCS | Mod: 26,,, | Performed by: RADIOLOGY

## 2020-11-12 LAB
HPV HR 12 DNA SPEC QL NAA+PROBE: NEGATIVE
HPV16 AG SPEC QL: NEGATIVE
HPV18 DNA SPEC QL NAA+PROBE: NEGATIVE

## 2020-12-10 LAB
FINAL PATHOLOGIC DIAGNOSIS: NORMAL
Lab: NORMAL

## 2021-01-13 ENCOUNTER — LAB VISIT (OUTPATIENT)
Dept: LAB | Facility: HOSPITAL | Age: 46
End: 2021-01-13
Attending: INTERNAL MEDICINE
Payer: OTHER GOVERNMENT

## 2021-01-13 DIAGNOSIS — E89.0 POST-SURGICAL HYPOTHYROIDISM: ICD-10-CM

## 2021-01-13 DIAGNOSIS — R05.9 COUGH: ICD-10-CM

## 2021-01-13 LAB
T3 SERPL-MCNC: 94 NG/DL (ref 60–180)
T4 FREE SERPL-MCNC: 1.63 NG/DL (ref 0.71–1.51)
TSH SERPL DL<=0.005 MIU/L-ACNC: 0.1 UIU/ML (ref 0.4–4)

## 2021-01-13 PROCEDURE — 36415 COLL VENOUS BLD VENIPUNCTURE: CPT

## 2021-01-13 PROCEDURE — 84480 ASSAY TRIIODOTHYRONINE (T3): CPT

## 2021-01-13 PROCEDURE — 84439 ASSAY OF FREE THYROXINE: CPT

## 2021-01-13 PROCEDURE — 84443 ASSAY THYROID STIM HORMONE: CPT

## 2021-01-14 ENCOUNTER — PATIENT MESSAGE (OUTPATIENT)
Dept: INTERNAL MEDICINE | Facility: CLINIC | Age: 46
End: 2021-01-14

## 2021-01-14 DIAGNOSIS — E89.0 POST-SURGICAL HYPOTHYROIDISM: Primary | ICD-10-CM

## 2021-01-14 RX ORDER — LEVOTHYROXINE SODIUM 175 UG/1
175 TABLET ORAL
Qty: 90 TABLET | Refills: 3 | Status: SHIPPED | OUTPATIENT
Start: 2021-01-14 | End: 2021-12-07

## 2021-02-08 ENCOUNTER — OFFICE VISIT (OUTPATIENT)
Dept: SPINE | Facility: CLINIC | Age: 46
End: 2021-02-08
Attending: PHYSICAL MEDICINE & REHABILITATION
Payer: OTHER GOVERNMENT

## 2021-02-08 VITALS
HEART RATE: 68 BPM | WEIGHT: 239.63 LBS | HEIGHT: 64 IN | SYSTOLIC BLOOD PRESSURE: 130 MMHG | BODY MASS INDEX: 40.91 KG/M2 | DIASTOLIC BLOOD PRESSURE: 63 MMHG

## 2021-02-08 DIAGNOSIS — G89.29 CHRONIC BILATERAL LOW BACK PAIN WITHOUT SCIATICA: ICD-10-CM

## 2021-02-08 DIAGNOSIS — M50.30 DDD (DEGENERATIVE DISC DISEASE), CERVICAL: ICD-10-CM

## 2021-02-08 DIAGNOSIS — M54.50 CHRONIC BILATERAL LOW BACK PAIN WITHOUT SCIATICA: ICD-10-CM

## 2021-02-08 DIAGNOSIS — M54.2 NECK PAIN: ICD-10-CM

## 2021-02-08 DIAGNOSIS — M54.6 CHRONIC RIGHT-SIDED THORACIC BACK PAIN: Primary | ICD-10-CM

## 2021-02-08 DIAGNOSIS — M62.838 MUSCLE SPASM: ICD-10-CM

## 2021-02-08 DIAGNOSIS — G89.29 CHRONIC RIGHT-SIDED THORACIC BACK PAIN: Primary | ICD-10-CM

## 2021-02-08 PROCEDURE — 99999 PR PBB SHADOW E&M-EST. PATIENT-LVL III: ICD-10-PCS | Mod: PBBFAC,,, | Performed by: PHYSICAL MEDICINE & REHABILITATION

## 2021-02-08 PROCEDURE — 99214 OFFICE O/P EST MOD 30 MIN: CPT | Mod: S$PBB,,, | Performed by: PHYSICAL MEDICINE & REHABILITATION

## 2021-02-08 PROCEDURE — 99214 PR OFFICE/OUTPT VISIT, EST, LEVL IV, 30-39 MIN: ICD-10-PCS | Mod: S$PBB,,, | Performed by: PHYSICAL MEDICINE & REHABILITATION

## 2021-02-08 PROCEDURE — 99213 OFFICE O/P EST LOW 20 MIN: CPT | Mod: PBBFAC | Performed by: PHYSICAL MEDICINE & REHABILITATION

## 2021-02-08 PROCEDURE — 99999 PR PBB SHADOW E&M-EST. PATIENT-LVL III: CPT | Mod: PBBFAC,,, | Performed by: PHYSICAL MEDICINE & REHABILITATION

## 2021-02-08 RX ORDER — METHYLPREDNISOLONE 4 MG/1
TABLET ORAL
Qty: 1 PACKAGE | Refills: 0 | Status: SHIPPED | OUTPATIENT
Start: 2021-02-08 | End: 2021-03-01

## 2021-04-12 ENCOUNTER — PATIENT MESSAGE (OUTPATIENT)
Dept: INTERNAL MEDICINE | Facility: CLINIC | Age: 46
End: 2021-04-12

## 2021-06-10 ENCOUNTER — LAB VISIT (OUTPATIENT)
Dept: LAB | Facility: HOSPITAL | Age: 46
End: 2021-06-10
Attending: INTERNAL MEDICINE
Payer: OTHER GOVERNMENT

## 2021-06-10 DIAGNOSIS — E89.0 POST-SURGICAL HYPOTHYROIDISM: ICD-10-CM

## 2021-06-10 LAB
ALBUMIN SERPL BCP-MCNC: 3.4 G/DL (ref 3.5–5.2)
ALP SERPL-CCNC: 71 U/L (ref 55–135)
ALT SERPL W/O P-5'-P-CCNC: 39 U/L (ref 10–44)
ANION GAP SERPL CALC-SCNC: 9 MMOL/L (ref 8–16)
AST SERPL-CCNC: 27 U/L (ref 10–40)
BILIRUB SERPL-MCNC: 0.5 MG/DL (ref 0.1–1)
BUN SERPL-MCNC: 11 MG/DL (ref 6–20)
CALCIUM SERPL-MCNC: 9.2 MG/DL (ref 8.7–10.5)
CHLORIDE SERPL-SCNC: 108 MMOL/L (ref 95–110)
CO2 SERPL-SCNC: 24 MMOL/L (ref 23–29)
CREAT SERPL-MCNC: 0.8 MG/DL (ref 0.5–1.4)
ERYTHROCYTE [DISTWIDTH] IN BLOOD BY AUTOMATED COUNT: 13.8 % (ref 11.5–14.5)
EST. GFR  (AFRICAN AMERICAN): >60 ML/MIN/1.73 M^2
EST. GFR  (NON AFRICAN AMERICAN): >60 ML/MIN/1.73 M^2
GLUCOSE SERPL-MCNC: 83 MG/DL (ref 70–110)
HCT VFR BLD AUTO: 40.4 % (ref 37–48.5)
HGB BLD-MCNC: 12.8 G/DL (ref 12–16)
MCH RBC QN AUTO: 27.5 PG (ref 27–31)
MCHC RBC AUTO-ENTMCNC: 31.7 G/DL (ref 32–36)
MCV RBC AUTO: 87 FL (ref 82–98)
PLATELET # BLD AUTO: 216 K/UL (ref 150–450)
PMV BLD AUTO: 11.8 FL (ref 9.2–12.9)
POTASSIUM SERPL-SCNC: 4.6 MMOL/L (ref 3.5–5.1)
PROT SERPL-MCNC: 6.8 G/DL (ref 6–8.4)
RBC # BLD AUTO: 4.65 M/UL (ref 4–5.4)
SODIUM SERPL-SCNC: 141 MMOL/L (ref 136–145)
T4 FREE SERPL-MCNC: 1.19 NG/DL (ref 0.71–1.51)
TSH SERPL DL<=0.005 MIU/L-ACNC: 0.39 UIU/ML (ref 0.4–4)
WBC # BLD AUTO: 7.98 K/UL (ref 3.9–12.7)

## 2021-06-10 PROCEDURE — 85027 COMPLETE CBC AUTOMATED: CPT | Performed by: INTERNAL MEDICINE

## 2021-06-10 PROCEDURE — 84443 ASSAY THYROID STIM HORMONE: CPT | Performed by: INTERNAL MEDICINE

## 2021-06-10 PROCEDURE — 80053 COMPREHEN METABOLIC PANEL: CPT | Performed by: INTERNAL MEDICINE

## 2021-06-10 PROCEDURE — 84439 ASSAY OF FREE THYROXINE: CPT | Performed by: INTERNAL MEDICINE

## 2021-06-10 PROCEDURE — 36415 COLL VENOUS BLD VENIPUNCTURE: CPT | Performed by: INTERNAL MEDICINE

## 2021-06-14 ENCOUNTER — HOSPITAL ENCOUNTER (OUTPATIENT)
Dept: RADIOLOGY | Facility: HOSPITAL | Age: 46
Discharge: HOME | End: 2021-06-14
Attending: INTERNAL MEDICINE
Payer: OTHER GOVERNMENT

## 2021-06-14 ENCOUNTER — OFFICE VISIT (OUTPATIENT)
Dept: INTERNAL MEDICINE | Facility: CLINIC | Age: 46
End: 2021-06-14
Payer: OTHER GOVERNMENT

## 2021-06-14 VITALS
HEART RATE: 91 BPM | DIASTOLIC BLOOD PRESSURE: 82 MMHG | SYSTOLIC BLOOD PRESSURE: 120 MMHG | BODY MASS INDEX: 37.04 KG/M2 | HEIGHT: 64 IN | OXYGEN SATURATION: 99 % | WEIGHT: 216.94 LBS

## 2021-06-14 DIAGNOSIS — Z00.00 ROUTINE GENERAL MEDICAL EXAMINATION AT A HEALTH CARE FACILITY: ICD-10-CM

## 2021-06-14 DIAGNOSIS — E89.0 POST-SURGICAL HYPOTHYROIDISM: ICD-10-CM

## 2021-06-14 DIAGNOSIS — M47.816 SPONDYLOSIS OF LUMBAR REGION WITHOUT MYELOPATHY OR RADICULOPATHY: ICD-10-CM

## 2021-06-14 DIAGNOSIS — M25.552 BILATERAL HIP PAIN: ICD-10-CM

## 2021-06-14 DIAGNOSIS — M25.551 BILATERAL HIP PAIN: ICD-10-CM

## 2021-06-14 DIAGNOSIS — Z80.8 FAMILY HISTORY OF MALIGNANT MELANOMA: Primary | ICD-10-CM

## 2021-06-14 PROCEDURE — 99999 PR PBB SHADOW E&M-EST. PATIENT-LVL V: ICD-10-PCS | Mod: PBBFAC,,, | Performed by: INTERNAL MEDICINE

## 2021-06-14 PROCEDURE — 73521 X-RAY EXAM HIPS BI 2 VIEWS: CPT | Mod: 26,,, | Performed by: RADIOLOGY

## 2021-06-14 PROCEDURE — 73521 XR HIPS BILATERAL 2 VIEW INCL AP PELVIS: ICD-10-PCS | Mod: 26,,, | Performed by: RADIOLOGY

## 2021-06-14 PROCEDURE — 99215 OFFICE O/P EST HI 40 MIN: CPT | Mod: PBBFAC,25 | Performed by: INTERNAL MEDICINE

## 2021-06-14 PROCEDURE — 99214 PR OFFICE/OUTPT VISIT, EST, LEVL IV, 30-39 MIN: ICD-10-PCS | Mod: S$PBB,,, | Performed by: INTERNAL MEDICINE

## 2021-06-14 PROCEDURE — 73521 X-RAY EXAM HIPS BI 2 VIEWS: CPT | Mod: TC

## 2021-06-14 PROCEDURE — 99999 PR PBB SHADOW E&M-EST. PATIENT-LVL V: CPT | Mod: PBBFAC,,, | Performed by: INTERNAL MEDICINE

## 2021-06-14 PROCEDURE — 99214 OFFICE O/P EST MOD 30 MIN: CPT | Mod: S$PBB,,, | Performed by: INTERNAL MEDICINE

## 2021-07-29 ENCOUNTER — CLINICAL SUPPORT (OUTPATIENT)
Dept: REHABILITATION | Facility: OTHER | Age: 46
End: 2021-07-29
Attending: INTERNAL MEDICINE
Payer: OTHER GOVERNMENT

## 2021-07-29 DIAGNOSIS — M47.816 SPONDYLOSIS OF LUMBAR REGION WITHOUT MYELOPATHY OR RADICULOPATHY: ICD-10-CM

## 2021-07-29 DIAGNOSIS — M25.551 BILATERAL HIP PAIN: ICD-10-CM

## 2021-07-29 DIAGNOSIS — M53.86 DECREASED RANGE OF MOTION OF LUMBAR SPINE: ICD-10-CM

## 2021-07-29 DIAGNOSIS — R29.3 POOR POSTURE: ICD-10-CM

## 2021-07-29 DIAGNOSIS — R29.898 DECREASED STRENGTH OF TRUNK AND BACK: ICD-10-CM

## 2021-07-29 DIAGNOSIS — M25.552 BILATERAL HIP PAIN: ICD-10-CM

## 2021-07-29 PROCEDURE — 97110 THERAPEUTIC EXERCISES: CPT

## 2021-07-29 PROCEDURE — 97161 PT EVAL LOW COMPLEX 20 MIN: CPT

## 2021-08-13 ENCOUNTER — TELEPHONE (OUTPATIENT)
Dept: INTERNAL MEDICINE | Facility: CLINIC | Age: 46
End: 2021-08-13

## 2021-08-13 DIAGNOSIS — Z12.11 COLON CANCER SCREENING: Primary | ICD-10-CM

## 2021-08-18 ENCOUNTER — PATIENT MESSAGE (OUTPATIENT)
Dept: INTERNAL MEDICINE | Facility: CLINIC | Age: 46
End: 2021-08-18

## 2021-08-19 ENCOUNTER — HOSPITAL ENCOUNTER (OUTPATIENT)
Dept: RADIOLOGY | Facility: HOSPITAL | Age: 46
Discharge: HOME | End: 2021-08-19
Attending: STUDENT IN AN ORGANIZED HEALTH CARE EDUCATION/TRAINING PROGRAM
Payer: OTHER GOVERNMENT

## 2021-08-19 ENCOUNTER — OFFICE VISIT (OUTPATIENT)
Dept: INTERNAL MEDICINE | Facility: CLINIC | Age: 46
End: 2021-08-19
Payer: OTHER GOVERNMENT

## 2021-08-19 DIAGNOSIS — M79.673 HEEL PAIN, UNSPECIFIED LATERALITY: Primary | ICD-10-CM

## 2021-08-19 DIAGNOSIS — M79.673 HEEL PAIN, UNSPECIFIED LATERALITY: ICD-10-CM

## 2021-08-19 PROCEDURE — 99213 PR OFFICE/OUTPT VISIT, EST, LEVL III, 20-29 MIN: ICD-10-PCS | Mod: S$PBB,,, | Performed by: STUDENT IN AN ORGANIZED HEALTH CARE EDUCATION/TRAINING PROGRAM

## 2021-08-19 PROCEDURE — 99999 PR PBB SHADOW E&M-EST. PATIENT-LVL III: ICD-10-PCS | Mod: PBBFAC,,, | Performed by: STUDENT IN AN ORGANIZED HEALTH CARE EDUCATION/TRAINING PROGRAM

## 2021-08-19 PROCEDURE — 99213 OFFICE O/P EST LOW 20 MIN: CPT | Mod: PBBFAC | Performed by: STUDENT IN AN ORGANIZED HEALTH CARE EDUCATION/TRAINING PROGRAM

## 2021-08-19 PROCEDURE — 73630 X-RAY EXAM OF FOOT: CPT | Mod: TC,RT

## 2021-08-19 PROCEDURE — 99213 OFFICE O/P EST LOW 20 MIN: CPT | Mod: S$PBB,,, | Performed by: STUDENT IN AN ORGANIZED HEALTH CARE EDUCATION/TRAINING PROGRAM

## 2021-08-19 PROCEDURE — 73630 XR FOOT COMPLETE 3 VIEW RIGHT: ICD-10-PCS | Mod: 26,RT,GC, | Performed by: RADIOLOGY

## 2021-08-19 PROCEDURE — 73630 X-RAY EXAM OF FOOT: CPT | Mod: 26,RT,GC, | Performed by: RADIOLOGY

## 2021-08-19 PROCEDURE — 99999 PR PBB SHADOW E&M-EST. PATIENT-LVL III: CPT | Mod: PBBFAC,,, | Performed by: STUDENT IN AN ORGANIZED HEALTH CARE EDUCATION/TRAINING PROGRAM

## 2021-08-19 RX ORDER — NAPROXEN 500 MG/1
500 TABLET ORAL 2 TIMES DAILY
Qty: 20 TABLET | Refills: 0 | Status: SHIPPED | OUTPATIENT
Start: 2021-08-19 | End: 2021-08-29

## 2021-08-23 ENCOUNTER — PATIENT MESSAGE (OUTPATIENT)
Dept: INTERNAL MEDICINE | Facility: CLINIC | Age: 46
End: 2021-08-23

## 2021-08-23 ENCOUNTER — TELEPHONE (OUTPATIENT)
Dept: INTERNAL MEDICINE | Facility: CLINIC | Age: 46
End: 2021-08-23

## 2021-08-23 DIAGNOSIS — M77.30 CALCANEAL SPUR, UNSPECIFIED LATERALITY: Primary | ICD-10-CM

## 2021-08-23 DIAGNOSIS — M77.31 CALCANEAL SPUR OF RIGHT FOOT: Primary | ICD-10-CM

## 2021-08-26 ENCOUNTER — OFFICE VISIT (OUTPATIENT)
Dept: DERMATOLOGY | Facility: CLINIC | Age: 46
End: 2021-08-26
Payer: OTHER GOVERNMENT

## 2021-08-26 ENCOUNTER — TELEPHONE (OUTPATIENT)
Dept: INTERNAL MEDICINE | Facility: CLINIC | Age: 46
End: 2021-08-26

## 2021-08-26 DIAGNOSIS — D18.01 ANGIOMA OF SKIN: ICD-10-CM

## 2021-08-26 DIAGNOSIS — L81.4 LENTIGO: ICD-10-CM

## 2021-08-26 DIAGNOSIS — D22.9 MULTIPLE BENIGN NEVI: Primary | ICD-10-CM

## 2021-08-26 DIAGNOSIS — L82.1 SK (SEBORRHEIC KERATOSIS): ICD-10-CM

## 2021-08-26 DIAGNOSIS — Z80.8 FAMILY HISTORY OF MALIGNANT MELANOMA: ICD-10-CM

## 2021-08-26 PROCEDURE — 99213 OFFICE O/P EST LOW 20 MIN: CPT | Mod: PBBFAC | Performed by: DERMATOLOGY

## 2021-08-26 PROCEDURE — 99999 PR PBB SHADOW E&M-EST. PATIENT-LVL III: ICD-10-PCS | Mod: PBBFAC,,, | Performed by: DERMATOLOGY

## 2021-08-26 PROCEDURE — 99999 PR PBB SHADOW E&M-EST. PATIENT-LVL III: CPT | Mod: PBBFAC,,, | Performed by: DERMATOLOGY

## 2021-08-26 PROCEDURE — 99203 PR OFFICE/OUTPT VISIT, NEW, LEVL III, 30-44 MIN: ICD-10-PCS | Mod: S$PBB,,, | Performed by: DERMATOLOGY

## 2021-08-26 PROCEDURE — 99203 OFFICE O/P NEW LOW 30 MIN: CPT | Mod: S$PBB,,, | Performed by: DERMATOLOGY

## 2021-08-27 ENCOUNTER — OFFICE VISIT (OUTPATIENT)
Dept: PODIATRY | Facility: CLINIC | Age: 46
End: 2021-08-27
Payer: OTHER GOVERNMENT

## 2021-08-27 VITALS
HEART RATE: 70 BPM | HEIGHT: 64 IN | DIASTOLIC BLOOD PRESSURE: 60 MMHG | SYSTOLIC BLOOD PRESSURE: 124 MMHG | WEIGHT: 216 LBS | BODY MASS INDEX: 36.88 KG/M2

## 2021-08-27 DIAGNOSIS — M79.671 FOOT PAIN, RIGHT: ICD-10-CM

## 2021-08-27 DIAGNOSIS — M72.2 PLANTAR FASCIITIS: Primary | ICD-10-CM

## 2021-08-27 DIAGNOSIS — M24.573 EQUINUS CONTRACTURE OF ANKLE: ICD-10-CM

## 2021-08-27 DIAGNOSIS — M77.31 CALCANEAL SPUR OF RIGHT FOOT: ICD-10-CM

## 2021-08-27 PROCEDURE — 99999 PR PBB SHADOW E&M-EST. PATIENT-LVL III: ICD-10-PCS | Mod: PBBFAC,,, | Performed by: PODIATRIST

## 2021-08-27 PROCEDURE — 99999 PR PBB SHADOW E&M-EST. PATIENT-LVL III: CPT | Mod: PBBFAC,,, | Performed by: PODIATRIST

## 2021-08-27 PROCEDURE — 29540 STRAPPING ANKLE &/FOOT: CPT | Mod: PBBFAC,PN,RT | Performed by: PODIATRIST

## 2021-08-27 PROCEDURE — 99213 OFFICE O/P EST LOW 20 MIN: CPT | Mod: PBBFAC,PN,25 | Performed by: PODIATRIST

## 2021-08-27 PROCEDURE — 29540 STRAPPING ANKLE &/FOOT: CPT | Mod: S$PBB,RT,, | Performed by: PODIATRIST

## 2021-08-27 PROCEDURE — 99204 OFFICE O/P NEW MOD 45 MIN: CPT | Mod: 25,S$PBB,, | Performed by: PODIATRIST

## 2021-08-27 PROCEDURE — 99204 PR OFFICE/OUTPT VISIT, NEW, LEVL IV, 45-59 MIN: ICD-10-PCS | Mod: 25,S$PBB,, | Performed by: PODIATRIST

## 2021-08-27 PROCEDURE — 29540 PR STRAPPING; ANKLE &/OR FOOT: ICD-10-PCS | Mod: S$PBB,RT,, | Performed by: PODIATRIST

## 2021-08-27 RX ORDER — METHYLPREDNISOLONE 4 MG/1
TABLET ORAL
Qty: 1 PACKAGE | Refills: 0 | Status: SHIPPED | OUTPATIENT
Start: 2021-08-27 | End: 2021-12-21

## 2021-08-27 RX ORDER — LIDOCAINE HYDROCHLORIDE 20 MG/ML
JELLY TOPICAL
Qty: 30 ML | Refills: 2 | Status: SHIPPED | OUTPATIENT
Start: 2021-08-27 | End: 2023-04-12 | Stop reason: CLARIF

## 2021-09-28 ENCOUNTER — OFFICE VISIT (OUTPATIENT)
Dept: PODIATRY | Facility: CLINIC | Age: 46
End: 2021-09-28
Payer: OTHER GOVERNMENT

## 2021-09-28 VITALS
SYSTOLIC BLOOD PRESSURE: 118 MMHG | WEIGHT: 216 LBS | HEIGHT: 64 IN | BODY MASS INDEX: 36.88 KG/M2 | HEART RATE: 65 BPM | DIASTOLIC BLOOD PRESSURE: 58 MMHG

## 2021-09-28 DIAGNOSIS — M79.671 FOOT PAIN, RIGHT: ICD-10-CM

## 2021-09-28 DIAGNOSIS — M24.573 EQUINUS CONTRACTURE OF ANKLE: ICD-10-CM

## 2021-09-28 DIAGNOSIS — M72.2 PLANTAR FASCIITIS: ICD-10-CM

## 2021-09-28 DIAGNOSIS — M77.31 CALCANEAL SPUR OF RIGHT FOOT: Primary | ICD-10-CM

## 2021-09-28 PROCEDURE — 99214 PR OFFICE/OUTPT VISIT, EST, LEVL IV, 30-39 MIN: ICD-10-PCS | Mod: 25,S$PBB,, | Performed by: PODIATRIST

## 2021-09-28 PROCEDURE — 20550 PR INJECT TENDON SHEATH/LIGAMENT: ICD-10-PCS | Mod: S$PBB,RT,, | Performed by: PODIATRIST

## 2021-09-28 PROCEDURE — 99999 PR PBB SHADOW E&M-EST. PATIENT-LVL IV: ICD-10-PCS | Mod: PBBFAC,,, | Performed by: PODIATRIST

## 2021-09-28 PROCEDURE — 99214 OFFICE O/P EST MOD 30 MIN: CPT | Mod: 25,S$PBB,, | Performed by: PODIATRIST

## 2021-09-28 PROCEDURE — 99214 OFFICE O/P EST MOD 30 MIN: CPT | Mod: PBBFAC,PN | Performed by: PODIATRIST

## 2021-09-28 PROCEDURE — 20550 NJX 1 TENDON SHEATH/LIGAMENT: CPT | Mod: PBBFAC,PN,RT | Performed by: PODIATRIST

## 2021-09-28 PROCEDURE — 99999 PR PBB SHADOW E&M-EST. PATIENT-LVL IV: CPT | Mod: PBBFAC,,, | Performed by: PODIATRIST

## 2021-09-28 PROCEDURE — 20550 NJX 1 TENDON SHEATH/LIGAMENT: CPT | Mod: S$PBB,RT,, | Performed by: PODIATRIST

## 2021-09-28 RX ORDER — DEXAMETHASONE SODIUM PHOSPHATE 4 MG/ML
4 INJECTION, SOLUTION INTRA-ARTICULAR; INTRALESIONAL; INTRAMUSCULAR; INTRAVENOUS; SOFT TISSUE
Status: COMPLETED | OUTPATIENT
Start: 2021-09-28 | End: 2021-09-28

## 2021-09-28 RX ORDER — LIDOCAINE HYDROCHLORIDE 20 MG/ML
JELLY TOPICAL
Qty: 30 ML | Refills: 2 | Status: SHIPPED | OUTPATIENT
Start: 2021-09-28 | End: 2023-04-12 | Stop reason: CLARIF

## 2021-09-28 RX ORDER — TRIAMCINOLONE ACETONIDE 40 MG/ML
40 INJECTION, SUSPENSION INTRA-ARTICULAR; INTRAMUSCULAR
Status: COMPLETED | OUTPATIENT
Start: 2021-09-28 | End: 2021-09-28

## 2021-09-28 RX ADMIN — TRIAMCINOLONE ACETONIDE 40 MG: 40 INJECTION, SUSPENSION INTRA-ARTICULAR; INTRAMUSCULAR at 03:09

## 2021-09-28 RX ADMIN — DEXAMETHASONE SODIUM PHOSPHATE 4 MG: 4 INJECTION, SOLUTION INTRA-ARTICULAR; INTRALESIONAL; INTRAMUSCULAR; INTRAVENOUS; SOFT TISSUE at 03:09

## 2021-09-29 ENCOUNTER — CLINICAL SUPPORT (OUTPATIENT)
Dept: REHABILITATION | Facility: OTHER | Age: 46
End: 2021-09-29
Attending: INTERNAL MEDICINE
Payer: OTHER GOVERNMENT

## 2021-09-29 DIAGNOSIS — R29.3 POOR POSTURE: ICD-10-CM

## 2021-09-29 DIAGNOSIS — M53.86 DECREASED RANGE OF MOTION OF LUMBAR SPINE: Primary | ICD-10-CM

## 2021-09-29 DIAGNOSIS — R29.898 DECREASED STRENGTH OF TRUNK AND BACK: ICD-10-CM

## 2021-09-29 PROCEDURE — 97110 THERAPEUTIC EXERCISES: CPT

## 2021-10-08 ENCOUNTER — CLINICAL SUPPORT (OUTPATIENT)
Dept: REHABILITATION | Facility: OTHER | Age: 46
End: 2021-10-08
Attending: INTERNAL MEDICINE
Payer: OTHER GOVERNMENT

## 2021-10-08 DIAGNOSIS — M53.86 DECREASED RANGE OF MOTION OF LUMBAR SPINE: Primary | ICD-10-CM

## 2021-10-08 DIAGNOSIS — R29.898 DECREASED STRENGTH OF TRUNK AND BACK: ICD-10-CM

## 2021-10-08 DIAGNOSIS — R29.3 POOR POSTURE: ICD-10-CM

## 2021-10-08 PROCEDURE — 97110 THERAPEUTIC EXERCISES: CPT | Mod: CQ

## 2021-10-08 PROCEDURE — 97140 MANUAL THERAPY 1/> REGIONS: CPT | Mod: CQ

## 2021-10-13 ENCOUNTER — CLINICAL SUPPORT (OUTPATIENT)
Dept: REHABILITATION | Facility: OTHER | Age: 46
End: 2021-10-13
Attending: INTERNAL MEDICINE
Payer: OTHER GOVERNMENT

## 2021-10-13 DIAGNOSIS — M53.86 DECREASED RANGE OF MOTION OF LUMBAR SPINE: Primary | ICD-10-CM

## 2021-10-13 DIAGNOSIS — R29.3 POOR POSTURE: ICD-10-CM

## 2021-10-13 DIAGNOSIS — R29.898 DECREASED STRENGTH OF TRUNK AND BACK: ICD-10-CM

## 2021-10-13 PROCEDURE — 97110 THERAPEUTIC EXERCISES: CPT

## 2021-10-13 PROCEDURE — 97140 MANUAL THERAPY 1/> REGIONS: CPT

## 2021-10-18 ENCOUNTER — CLINICAL SUPPORT (OUTPATIENT)
Dept: REHABILITATION | Facility: OTHER | Age: 46
End: 2021-10-18
Attending: INTERNAL MEDICINE
Payer: OTHER GOVERNMENT

## 2021-10-18 DIAGNOSIS — M53.86 DECREASED RANGE OF MOTION OF LUMBAR SPINE: Primary | ICD-10-CM

## 2021-10-18 DIAGNOSIS — R29.3 POOR POSTURE: ICD-10-CM

## 2021-10-18 DIAGNOSIS — R29.898 DECREASED STRENGTH OF TRUNK AND BACK: ICD-10-CM

## 2021-10-18 PROCEDURE — 97140 MANUAL THERAPY 1/> REGIONS: CPT | Mod: CQ

## 2021-10-18 PROCEDURE — 97110 THERAPEUTIC EXERCISES: CPT | Mod: CQ

## 2021-10-20 ENCOUNTER — CLINICAL SUPPORT (OUTPATIENT)
Dept: REHABILITATION | Facility: OTHER | Age: 46
End: 2021-10-20
Attending: INTERNAL MEDICINE
Payer: OTHER GOVERNMENT

## 2021-10-20 DIAGNOSIS — R29.3 POOR POSTURE: ICD-10-CM

## 2021-10-20 DIAGNOSIS — M53.86 DECREASED RANGE OF MOTION OF LUMBAR SPINE: Primary | ICD-10-CM

## 2021-10-20 DIAGNOSIS — R29.898 DECREASED STRENGTH OF TRUNK AND BACK: ICD-10-CM

## 2021-10-20 PROCEDURE — 97110 THERAPEUTIC EXERCISES: CPT

## 2021-10-27 ENCOUNTER — CLINICAL SUPPORT (OUTPATIENT)
Dept: REHABILITATION | Facility: OTHER | Age: 46
End: 2021-10-27
Attending: INTERNAL MEDICINE
Payer: OTHER GOVERNMENT

## 2021-10-27 DIAGNOSIS — M53.86 DECREASED RANGE OF MOTION OF LUMBAR SPINE: Primary | ICD-10-CM

## 2021-10-27 DIAGNOSIS — R29.898 DECREASED STRENGTH OF TRUNK AND BACK: ICD-10-CM

## 2021-10-27 DIAGNOSIS — R29.3 POOR POSTURE: ICD-10-CM

## 2021-10-27 PROCEDURE — 97110 THERAPEUTIC EXERCISES: CPT | Mod: CQ

## 2021-11-01 ENCOUNTER — CLINICAL SUPPORT (OUTPATIENT)
Dept: REHABILITATION | Facility: OTHER | Age: 46
End: 2021-11-01
Attending: INTERNAL MEDICINE
Payer: OTHER GOVERNMENT

## 2021-11-01 DIAGNOSIS — R29.898 DECREASED STRENGTH OF TRUNK AND BACK: ICD-10-CM

## 2021-11-01 DIAGNOSIS — M53.86 DECREASED RANGE OF MOTION OF LUMBAR SPINE: Primary | ICD-10-CM

## 2021-11-01 DIAGNOSIS — R29.3 POOR POSTURE: ICD-10-CM

## 2021-11-01 PROCEDURE — 97110 THERAPEUTIC EXERCISES: CPT | Mod: CQ

## 2021-11-08 ENCOUNTER — CLINICAL SUPPORT (OUTPATIENT)
Dept: REHABILITATION | Facility: OTHER | Age: 46
End: 2021-11-08
Attending: INTERNAL MEDICINE
Payer: OTHER GOVERNMENT

## 2021-11-08 DIAGNOSIS — M53.86 DECREASED RANGE OF MOTION OF LUMBAR SPINE: Primary | ICD-10-CM

## 2021-11-08 DIAGNOSIS — R29.898 DECREASED STRENGTH OF TRUNK AND BACK: ICD-10-CM

## 2021-11-08 DIAGNOSIS — R29.3 POOR POSTURE: ICD-10-CM

## 2021-11-08 PROCEDURE — 97110 THERAPEUTIC EXERCISES: CPT | Mod: CQ

## 2021-11-09 ENCOUNTER — OFFICE VISIT (OUTPATIENT)
Dept: PODIATRY | Facility: CLINIC | Age: 46
End: 2021-11-09
Payer: OTHER GOVERNMENT

## 2021-11-09 VITALS
WEIGHT: 216 LBS | BODY MASS INDEX: 36.88 KG/M2 | HEART RATE: 77 BPM | HEIGHT: 64 IN | DIASTOLIC BLOOD PRESSURE: 59 MMHG | SYSTOLIC BLOOD PRESSURE: 126 MMHG

## 2021-11-09 DIAGNOSIS — T81.89XA SUTURE GRANULOMA, INITIAL ENCOUNTER: ICD-10-CM

## 2021-11-09 DIAGNOSIS — M77.30 CALCANEAL SPUR, UNSPECIFIED LATERALITY: Primary | ICD-10-CM

## 2021-11-09 PROCEDURE — 99999 PR PBB SHADOW E&M-EST. PATIENT-LVL III: CPT | Mod: PBBFAC,,, | Performed by: PODIATRIST

## 2021-11-09 PROCEDURE — 99212 OFFICE O/P EST SF 10 MIN: CPT | Mod: S$PBB,,, | Performed by: PODIATRIST

## 2021-11-09 PROCEDURE — 99999 PR PBB SHADOW E&M-EST. PATIENT-LVL III: ICD-10-PCS | Mod: PBBFAC,,, | Performed by: PODIATRIST

## 2021-11-09 PROCEDURE — 99212 PR OFFICE/OUTPT VISIT, EST, LEVL II, 10-19 MIN: ICD-10-PCS | Mod: S$PBB,,, | Performed by: PODIATRIST

## 2021-11-09 PROCEDURE — 99213 OFFICE O/P EST LOW 20 MIN: CPT | Mod: PBBFAC,PN | Performed by: PODIATRIST

## 2021-11-10 ENCOUNTER — CLINICAL SUPPORT (OUTPATIENT)
Dept: REHABILITATION | Facility: OTHER | Age: 46
End: 2021-11-10
Attending: INTERNAL MEDICINE
Payer: OTHER GOVERNMENT

## 2021-11-10 DIAGNOSIS — M53.86 DECREASED RANGE OF MOTION OF LUMBAR SPINE: Primary | ICD-10-CM

## 2021-11-10 DIAGNOSIS — R29.3 POOR POSTURE: ICD-10-CM

## 2021-11-10 DIAGNOSIS — R29.898 DECREASED STRENGTH OF TRUNK AND BACK: ICD-10-CM

## 2021-11-10 PROCEDURE — 97110 THERAPEUTIC EXERCISES: CPT

## 2021-11-17 ENCOUNTER — CLINICAL SUPPORT (OUTPATIENT)
Dept: REHABILITATION | Facility: OTHER | Age: 46
End: 2021-11-17
Attending: INTERNAL MEDICINE
Payer: OTHER GOVERNMENT

## 2021-11-17 DIAGNOSIS — M53.86 DECREASED RANGE OF MOTION OF LUMBAR SPINE: Primary | ICD-10-CM

## 2021-11-17 DIAGNOSIS — R29.898 DECREASED STRENGTH OF TRUNK AND BACK: ICD-10-CM

## 2021-11-17 DIAGNOSIS — R29.3 POOR POSTURE: ICD-10-CM

## 2021-11-17 PROCEDURE — 97110 THERAPEUTIC EXERCISES: CPT | Mod: CQ

## 2021-11-17 PROCEDURE — 97140 MANUAL THERAPY 1/> REGIONS: CPT | Mod: CQ

## 2021-11-19 ENCOUNTER — CLINICAL SUPPORT (OUTPATIENT)
Dept: REHABILITATION | Facility: OTHER | Age: 46
End: 2021-11-19
Attending: INTERNAL MEDICINE
Payer: OTHER GOVERNMENT

## 2021-11-19 DIAGNOSIS — M53.86 DECREASED RANGE OF MOTION OF LUMBAR SPINE: Primary | ICD-10-CM

## 2021-11-19 DIAGNOSIS — R29.898 DECREASED STRENGTH OF TRUNK AND BACK: ICD-10-CM

## 2021-11-19 DIAGNOSIS — R29.3 POOR POSTURE: ICD-10-CM

## 2021-11-19 PROCEDURE — 97530 THERAPEUTIC ACTIVITIES: CPT

## 2021-11-19 PROCEDURE — 97110 THERAPEUTIC EXERCISES: CPT

## 2021-11-24 ENCOUNTER — CLINICAL SUPPORT (OUTPATIENT)
Dept: REHABILITATION | Facility: OTHER | Age: 46
End: 2021-11-24
Attending: INTERNAL MEDICINE
Payer: OTHER GOVERNMENT

## 2021-11-24 DIAGNOSIS — R29.3 POOR POSTURE: ICD-10-CM

## 2021-11-24 DIAGNOSIS — M53.86 DECREASED RANGE OF MOTION OF LUMBAR SPINE: Primary | ICD-10-CM

## 2021-11-24 DIAGNOSIS — R29.898 DECREASED STRENGTH OF TRUNK AND BACK: ICD-10-CM

## 2021-11-24 PROCEDURE — 97140 MANUAL THERAPY 1/> REGIONS: CPT | Mod: CQ

## 2021-11-24 PROCEDURE — 97110 THERAPEUTIC EXERCISES: CPT | Mod: CQ

## 2021-12-02 ENCOUNTER — PATIENT MESSAGE (OUTPATIENT)
Dept: OBSTETRICS AND GYNECOLOGY | Facility: CLINIC | Age: 46
End: 2021-12-02
Payer: OTHER GOVERNMENT

## 2021-12-03 ENCOUNTER — PATIENT OUTREACH (OUTPATIENT)
Dept: ADMINISTRATIVE | Facility: OTHER | Age: 46
End: 2021-12-03
Payer: OTHER GOVERNMENT

## 2021-12-03 DIAGNOSIS — Z12.31 ENCOUNTER FOR SCREENING MAMMOGRAM FOR MALIGNANT NEOPLASM OF BREAST: Primary | ICD-10-CM

## 2021-12-06 ENCOUNTER — OFFICE VISIT (OUTPATIENT)
Dept: OBSTETRICS AND GYNECOLOGY | Facility: CLINIC | Age: 46
End: 2021-12-06
Payer: OTHER GOVERNMENT

## 2021-12-06 ENCOUNTER — CLINICAL SUPPORT (OUTPATIENT)
Dept: REHABILITATION | Facility: OTHER | Age: 46
End: 2021-12-06
Attending: INTERNAL MEDICINE
Payer: OTHER GOVERNMENT

## 2021-12-06 ENCOUNTER — LAB VISIT (OUTPATIENT)
Dept: LAB | Facility: OTHER | Age: 46
End: 2021-12-06
Attending: OBSTETRICS & GYNECOLOGY
Payer: OTHER GOVERNMENT

## 2021-12-06 VITALS
DIASTOLIC BLOOD PRESSURE: 88 MMHG | WEIGHT: 220 LBS | SYSTOLIC BLOOD PRESSURE: 126 MMHG | HEIGHT: 64 IN | BODY MASS INDEX: 37.56 KG/M2

## 2021-12-06 DIAGNOSIS — N93.9 ABNORMAL UTERINE BLEEDING (AUB): Primary | ICD-10-CM

## 2021-12-06 DIAGNOSIS — R29.3 POOR POSTURE: ICD-10-CM

## 2021-12-06 DIAGNOSIS — D25.9 UTERINE LEIOMYOMA, UNSPECIFIED LOCATION: ICD-10-CM

## 2021-12-06 DIAGNOSIS — M53.86 DECREASED RANGE OF MOTION OF LUMBAR SPINE: ICD-10-CM

## 2021-12-06 DIAGNOSIS — R29.898 DECREASED STRENGTH OF TRUNK AND BACK: ICD-10-CM

## 2021-12-06 LAB
BASOPHILS # BLD AUTO: 0.04 K/UL (ref 0–0.2)
BASOPHILS NFR BLD: 0.5 % (ref 0–1.9)
DIFFERENTIAL METHOD: ABNORMAL
EOSINOPHIL # BLD AUTO: 0.1 K/UL (ref 0–0.5)
EOSINOPHIL NFR BLD: 1.6 % (ref 0–8)
ERYTHROCYTE [DISTWIDTH] IN BLOOD BY AUTOMATED COUNT: 13.4 % (ref 11.5–14.5)
HCT VFR BLD AUTO: 38.2 % (ref 37–48.5)
HGB BLD-MCNC: 11.8 G/DL (ref 12–16)
IMM GRANULOCYTES # BLD AUTO: 0.05 K/UL (ref 0–0.04)
IMM GRANULOCYTES NFR BLD AUTO: 0.6 % (ref 0–0.5)
LYMPHOCYTES # BLD AUTO: 2.2 K/UL (ref 1–4.8)
LYMPHOCYTES NFR BLD: 28.4 % (ref 18–48)
MCH RBC QN AUTO: 27.9 PG (ref 27–31)
MCHC RBC AUTO-ENTMCNC: 30.9 G/DL (ref 32–36)
MCV RBC AUTO: 90 FL (ref 82–98)
MONOCYTES # BLD AUTO: 0.4 K/UL (ref 0.3–1)
MONOCYTES NFR BLD: 4.9 % (ref 4–15)
NEUTROPHILS # BLD AUTO: 4.9 K/UL (ref 1.8–7.7)
NEUTROPHILS NFR BLD: 64 % (ref 38–73)
NRBC BLD-RTO: 0 /100 WBC
PLATELET # BLD AUTO: 287 K/UL (ref 150–450)
PMV BLD AUTO: 10.4 FL (ref 9.2–12.9)
RBC # BLD AUTO: 4.23 M/UL (ref 4–5.4)
T4 FREE SERPL-MCNC: 1.23 NG/DL (ref 0.71–1.51)
TSH SERPL DL<=0.005 MIU/L-ACNC: 5.79 UIU/ML (ref 0.4–4)
WBC # BLD AUTO: 7.71 K/UL (ref 3.9–12.7)

## 2021-12-06 PROCEDURE — 85025 COMPLETE CBC W/AUTO DIFF WBC: CPT | Performed by: OBSTETRICS & GYNECOLOGY

## 2021-12-06 PROCEDURE — 84443 ASSAY THYROID STIM HORMONE: CPT | Performed by: OBSTETRICS & GYNECOLOGY

## 2021-12-06 PROCEDURE — 99999 PR PBB SHADOW E&M-EST. PATIENT-LVL III: CPT | Mod: PBBFAC,,, | Performed by: OBSTETRICS & GYNECOLOGY

## 2021-12-06 PROCEDURE — 84439 ASSAY OF FREE THYROXINE: CPT | Performed by: OBSTETRICS & GYNECOLOGY

## 2021-12-06 PROCEDURE — 99213 OFFICE O/P EST LOW 20 MIN: CPT | Mod: PBBFAC | Performed by: OBSTETRICS & GYNECOLOGY

## 2021-12-06 PROCEDURE — 97110 THERAPEUTIC EXERCISES: CPT | Mod: CQ

## 2021-12-06 PROCEDURE — 97140 MANUAL THERAPY 1/> REGIONS: CPT | Mod: CQ

## 2021-12-06 PROCEDURE — 99213 PR OFFICE/OUTPT VISIT, EST, LEVL III, 20-29 MIN: ICD-10-PCS | Mod: S$PBB,,, | Performed by: OBSTETRICS & GYNECOLOGY

## 2021-12-06 PROCEDURE — 99999 PR PBB SHADOW E&M-EST. PATIENT-LVL III: ICD-10-PCS | Mod: PBBFAC,,, | Performed by: OBSTETRICS & GYNECOLOGY

## 2021-12-06 PROCEDURE — 99213 OFFICE O/P EST LOW 20 MIN: CPT | Mod: S$PBB,,, | Performed by: OBSTETRICS & GYNECOLOGY

## 2021-12-06 PROCEDURE — 36415 COLL VENOUS BLD VENIPUNCTURE: CPT | Performed by: OBSTETRICS & GYNECOLOGY

## 2021-12-07 ENCOUNTER — PATIENT MESSAGE (OUTPATIENT)
Dept: OBSTETRICS AND GYNECOLOGY | Facility: CLINIC | Age: 46
End: 2021-12-07
Payer: OTHER GOVERNMENT

## 2021-12-07 RX ORDER — LEVOTHYROXINE SODIUM 200 UG/1
200 TABLET ORAL DAILY
Qty: 30 TABLET | Refills: 11 | Status: SHIPPED | OUTPATIENT
Start: 2021-12-07 | End: 2022-02-07 | Stop reason: SDUPTHER

## 2021-12-09 ENCOUNTER — OFFICE VISIT (OUTPATIENT)
Dept: INTERNAL MEDICINE | Facility: CLINIC | Age: 46
End: 2021-12-09
Payer: OTHER GOVERNMENT

## 2021-12-09 VITALS
SYSTOLIC BLOOD PRESSURE: 110 MMHG | BODY MASS INDEX: 37.56 KG/M2 | HEIGHT: 64 IN | OXYGEN SATURATION: 98 % | HEART RATE: 83 BPM | DIASTOLIC BLOOD PRESSURE: 60 MMHG | WEIGHT: 220 LBS

## 2021-12-09 DIAGNOSIS — R22.2 SUBCUTANEOUS MASS OF ABDOMINAL WALL: Primary | ICD-10-CM

## 2021-12-09 PROCEDURE — 99999 PR PBB SHADOW E&M-EST. PATIENT-LVL IV: ICD-10-PCS | Mod: PBBFAC,,, | Performed by: PHYSICIAN ASSISTANT

## 2021-12-09 PROCEDURE — 99213 OFFICE O/P EST LOW 20 MIN: CPT | Mod: S$PBB,,, | Performed by: PHYSICIAN ASSISTANT

## 2021-12-09 PROCEDURE — 99999 PR PBB SHADOW E&M-EST. PATIENT-LVL IV: CPT | Mod: PBBFAC,,, | Performed by: PHYSICIAN ASSISTANT

## 2021-12-09 PROCEDURE — 99214 OFFICE O/P EST MOD 30 MIN: CPT | Mod: PBBFAC | Performed by: PHYSICIAN ASSISTANT

## 2021-12-09 PROCEDURE — 99213 PR OFFICE/OUTPT VISIT, EST, LEVL III, 20-29 MIN: ICD-10-PCS | Mod: S$PBB,,, | Performed by: PHYSICIAN ASSISTANT

## 2021-12-13 ENCOUNTER — CLINICAL SUPPORT (OUTPATIENT)
Dept: REHABILITATION | Facility: OTHER | Age: 46
End: 2021-12-13
Attending: INTERNAL MEDICINE
Payer: OTHER GOVERNMENT

## 2021-12-13 DIAGNOSIS — M53.86 DECREASED RANGE OF MOTION OF LUMBAR SPINE: Primary | ICD-10-CM

## 2021-12-13 DIAGNOSIS — R29.898 DECREASED STRENGTH OF TRUNK AND BACK: ICD-10-CM

## 2021-12-13 DIAGNOSIS — R29.3 POOR POSTURE: ICD-10-CM

## 2021-12-13 PROCEDURE — 97140 MANUAL THERAPY 1/> REGIONS: CPT | Mod: CQ

## 2021-12-13 PROCEDURE — 97110 THERAPEUTIC EXERCISES: CPT | Mod: CQ

## 2021-12-17 ENCOUNTER — HOSPITAL ENCOUNTER (OUTPATIENT)
Dept: RADIOLOGY | Facility: OTHER | Age: 46
Discharge: HOME | End: 2021-12-17
Attending: PHYSICIAN ASSISTANT
Payer: OTHER GOVERNMENT

## 2021-12-17 ENCOUNTER — CLINICAL SUPPORT (OUTPATIENT)
Dept: REHABILITATION | Facility: OTHER | Age: 46
End: 2021-12-17
Attending: INTERNAL MEDICINE
Payer: OTHER GOVERNMENT

## 2021-12-17 DIAGNOSIS — R29.898 DECREASED STRENGTH OF TRUNK AND BACK: ICD-10-CM

## 2021-12-17 DIAGNOSIS — R22.2 SUBCUTANEOUS MASS OF ABDOMINAL WALL: ICD-10-CM

## 2021-12-17 DIAGNOSIS — M53.86 DECREASED RANGE OF MOTION OF LUMBAR SPINE: Primary | ICD-10-CM

## 2021-12-17 DIAGNOSIS — R29.3 POOR POSTURE: ICD-10-CM

## 2021-12-17 PROCEDURE — 97140 MANUAL THERAPY 1/> REGIONS: CPT | Mod: CQ

## 2021-12-17 PROCEDURE — 97110 THERAPEUTIC EXERCISES: CPT | Mod: CQ

## 2021-12-17 PROCEDURE — 76705 ECHO EXAM OF ABDOMEN: CPT | Mod: TC

## 2021-12-17 PROCEDURE — 76705 ECHO EXAM OF ABDOMEN: CPT | Mod: 26,,, | Performed by: RADIOLOGY

## 2021-12-17 PROCEDURE — 76705 US SOFT TISSUE, ABDOMEN: ICD-10-PCS | Mod: 26,,, | Performed by: RADIOLOGY

## 2021-12-20 ENCOUNTER — PATIENT MESSAGE (OUTPATIENT)
Dept: INTERNAL MEDICINE | Facility: CLINIC | Age: 46
End: 2021-12-20
Payer: OTHER GOVERNMENT

## 2021-12-20 DIAGNOSIS — D17.9 LIPOMA, UNSPECIFIED SITE: Primary | ICD-10-CM

## 2021-12-21 ENCOUNTER — TELEPHONE (OUTPATIENT)
Dept: INTERNAL MEDICINE | Facility: CLINIC | Age: 46
End: 2021-12-21
Payer: OTHER GOVERNMENT

## 2021-12-21 ENCOUNTER — OFFICE VISIT (OUTPATIENT)
Dept: PODIATRY | Facility: CLINIC | Age: 46
End: 2021-12-21
Payer: OTHER GOVERNMENT

## 2021-12-21 VITALS
SYSTOLIC BLOOD PRESSURE: 102 MMHG | WEIGHT: 220 LBS | DIASTOLIC BLOOD PRESSURE: 52 MMHG | HEIGHT: 64 IN | HEART RATE: 86 BPM | BODY MASS INDEX: 37.56 KG/M2

## 2021-12-21 DIAGNOSIS — M72.2 PLANTAR FASCIITIS: ICD-10-CM

## 2021-12-21 DIAGNOSIS — M77.30 CALCANEAL SPUR, UNSPECIFIED LATERALITY: Primary | ICD-10-CM

## 2021-12-21 DIAGNOSIS — M77.31 CALCANEAL SPUR OF RIGHT FOOT: ICD-10-CM

## 2021-12-21 PROCEDURE — 99213 PR OFFICE/OUTPT VISIT, EST, LEVL III, 20-29 MIN: ICD-10-PCS | Mod: 25,S$PBB,, | Performed by: PODIATRIST

## 2021-12-21 PROCEDURE — 20550 PR INJECT TENDON SHEATH/LIGAMENT: ICD-10-PCS | Mod: 59,S$PBB,RT, | Performed by: PODIATRIST

## 2021-12-21 PROCEDURE — 99999 PR PBB SHADOW E&M-EST. PATIENT-LVL IV: ICD-10-PCS | Mod: PBBFAC,,, | Performed by: PODIATRIST

## 2021-12-21 PROCEDURE — 99213 OFFICE O/P EST LOW 20 MIN: CPT | Mod: 25,S$PBB,, | Performed by: PODIATRIST

## 2021-12-21 PROCEDURE — 20550 NJX 1 TENDON SHEATH/LIGAMENT: CPT | Mod: 59,S$PBB,RT, | Performed by: PODIATRIST

## 2021-12-21 PROCEDURE — 99999 PR PBB SHADOW E&M-EST. PATIENT-LVL IV: CPT | Mod: PBBFAC,,, | Performed by: PODIATRIST

## 2021-12-21 PROCEDURE — 99214 OFFICE O/P EST MOD 30 MIN: CPT | Mod: PBBFAC,PN,25 | Performed by: PODIATRIST

## 2021-12-21 PROCEDURE — 20550 NJX 1 TENDON SHEATH/LIGAMENT: CPT | Mod: 59,PBBFAC,PN,RT | Performed by: PODIATRIST

## 2021-12-21 RX ORDER — TRIAMCINOLONE ACETONIDE 40 MG/ML
40 INJECTION, SUSPENSION INTRA-ARTICULAR; INTRAMUSCULAR
Status: COMPLETED | OUTPATIENT
Start: 2021-12-21 | End: 2021-12-21

## 2021-12-21 RX ORDER — DEXAMETHASONE SODIUM PHOSPHATE 4 MG/ML
4 INJECTION, SOLUTION INTRA-ARTICULAR; INTRALESIONAL; INTRAMUSCULAR; INTRAVENOUS; SOFT TISSUE
Status: COMPLETED | OUTPATIENT
Start: 2021-12-21 | End: 2021-12-21

## 2021-12-21 RX ADMIN — TRIAMCINOLONE ACETONIDE 40 MG: 40 INJECTION, SUSPENSION INTRA-ARTICULAR; INTRAMUSCULAR at 03:12

## 2021-12-21 RX ADMIN — DEXAMETHASONE SODIUM PHOSPHATE 4 MG: 4 INJECTION, SOLUTION INTRA-ARTICULAR; INTRALESIONAL; INTRAMUSCULAR; INTRAVENOUS; SOFT TISSUE at 03:12

## 2021-12-21 NOTE — TELEPHONE ENCOUNTER
Spoke with pt, tried assisting in scheduling for general surgery. I didn't have access gave pt general surgery number.

## 2021-12-23 ENCOUNTER — HOSPITAL ENCOUNTER (OUTPATIENT)
Dept: RADIOLOGY | Facility: OTHER | Age: 46
Discharge: HOME | End: 2021-12-23
Attending: OBSTETRICS & GYNECOLOGY
Payer: OTHER GOVERNMENT

## 2021-12-23 ENCOUNTER — HOSPITAL ENCOUNTER (OUTPATIENT)
Dept: RADIOLOGY | Facility: OTHER | Age: 46
Discharge: HOME | End: 2021-12-23
Attending: INTERNAL MEDICINE
Payer: OTHER GOVERNMENT

## 2021-12-23 DIAGNOSIS — D25.9 UTERINE LEIOMYOMA, UNSPECIFIED LOCATION: ICD-10-CM

## 2021-12-23 DIAGNOSIS — Z12.31 ENCOUNTER FOR SCREENING MAMMOGRAM FOR MALIGNANT NEOPLASM OF BREAST: ICD-10-CM

## 2021-12-23 PROCEDURE — 76830 US PELVIS COMP WITH TRANSVAG NON-OB (XPD): ICD-10-PCS | Mod: 26,,, | Performed by: RADIOLOGY

## 2021-12-23 PROCEDURE — 77067 MAMMO DIGITAL SCREENING BILAT WITH TOMO: ICD-10-PCS | Mod: 26,,, | Performed by: RADIOLOGY

## 2021-12-23 PROCEDURE — 77063 MAMMO DIGITAL SCREENING BILAT WITH TOMO: ICD-10-PCS | Mod: 26,,, | Performed by: RADIOLOGY

## 2021-12-23 PROCEDURE — 76856 US PELVIS COMP WITH TRANSVAG NON-OB (XPD): ICD-10-PCS | Mod: 26,,, | Performed by: RADIOLOGY

## 2021-12-23 PROCEDURE — 77067 SCR MAMMO BI INCL CAD: CPT | Mod: 26,,, | Performed by: RADIOLOGY

## 2021-12-23 PROCEDURE — 76856 US EXAM PELVIC COMPLETE: CPT | Mod: TC

## 2021-12-23 PROCEDURE — 76856 US EXAM PELVIC COMPLETE: CPT | Mod: 26,,, | Performed by: RADIOLOGY

## 2021-12-23 PROCEDURE — 76830 TRANSVAGINAL US NON-OB: CPT | Mod: 26,,, | Performed by: RADIOLOGY

## 2021-12-23 PROCEDURE — 77063 BREAST TOMOSYNTHESIS BI: CPT | Mod: 26,,, | Performed by: RADIOLOGY

## 2021-12-23 PROCEDURE — 77067 SCR MAMMO BI INCL CAD: CPT | Mod: TC

## 2021-12-28 ENCOUNTER — CLINICAL SUPPORT (OUTPATIENT)
Dept: REHABILITATION | Facility: OTHER | Age: 46
End: 2021-12-28
Attending: INTERNAL MEDICINE
Payer: OTHER GOVERNMENT

## 2021-12-28 DIAGNOSIS — R29.898 DECREASED STRENGTH OF TRUNK AND BACK: ICD-10-CM

## 2021-12-28 DIAGNOSIS — M53.86 DECREASED RANGE OF MOTION OF LUMBAR SPINE: Primary | ICD-10-CM

## 2021-12-28 DIAGNOSIS — M77.31 CALCANEAL SPUR OF RIGHT FOOT: ICD-10-CM

## 2021-12-28 DIAGNOSIS — M72.2 PLANTAR FASCIITIS: ICD-10-CM

## 2021-12-28 DIAGNOSIS — M77.30 CALCANEAL SPUR, UNSPECIFIED LATERALITY: ICD-10-CM

## 2021-12-28 DIAGNOSIS — R29.3 POOR POSTURE: ICD-10-CM

## 2021-12-28 PROCEDURE — 97140 MANUAL THERAPY 1/> REGIONS: CPT

## 2021-12-28 PROCEDURE — 97110 THERAPEUTIC EXERCISES: CPT

## 2021-12-29 ENCOUNTER — PATIENT MESSAGE (OUTPATIENT)
Dept: OBSTETRICS AND GYNECOLOGY | Facility: CLINIC | Age: 46
End: 2021-12-29
Payer: OTHER GOVERNMENT

## 2021-12-30 RX ORDER — NORGESTIMATE AND ETHINYL ESTRADIOL 0.25-0.035
1 KIT ORAL DAILY
Qty: 90 TABLET | Refills: 3 | Status: SHIPPED | OUTPATIENT
Start: 2021-12-30 | End: 2022-08-17

## 2022-01-05 ENCOUNTER — PATIENT MESSAGE (OUTPATIENT)
Dept: INTERNAL MEDICINE | Facility: CLINIC | Age: 47
End: 2022-01-05
Payer: OTHER GOVERNMENT

## 2022-01-05 ENCOUNTER — NURSE TRIAGE (OUTPATIENT)
Dept: ADMINISTRATIVE | Facility: CLINIC | Age: 47
End: 2022-01-05
Payer: OTHER GOVERNMENT

## 2022-01-06 ENCOUNTER — HOSPITAL ENCOUNTER (EMERGENCY)
Facility: HOSPITAL | Age: 47
Discharge: HOME | End: 2022-01-06
Attending: EMERGENCY MEDICINE
Payer: OTHER GOVERNMENT

## 2022-01-06 VITALS
DIASTOLIC BLOOD PRESSURE: 74 MMHG | WEIGHT: 230 LBS | OXYGEN SATURATION: 97 % | TEMPERATURE: 99 F | RESPIRATION RATE: 17 BRPM | BODY MASS INDEX: 39.27 KG/M2 | SYSTOLIC BLOOD PRESSURE: 134 MMHG | HEART RATE: 105 BPM | HEIGHT: 64 IN

## 2022-01-06 DIAGNOSIS — R51.9 ACUTE NONINTRACTABLE HEADACHE, UNSPECIFIED HEADACHE TYPE: Primary | ICD-10-CM

## 2022-01-06 LAB
B-HCG UR QL: NEGATIVE
CTP QC/QA: YES

## 2022-01-06 PROCEDURE — 96375 TX/PRO/DX INJ NEW DRUG ADDON: CPT

## 2022-01-06 PROCEDURE — 63600175 PHARM REV CODE 636 W HCPCS: Performed by: PHYSICIAN ASSISTANT

## 2022-01-06 PROCEDURE — 25000003 PHARM REV CODE 250: Performed by: PHYSICIAN ASSISTANT

## 2022-01-06 PROCEDURE — 99284 EMERGENCY DEPT VISIT MOD MDM: CPT | Mod: 25

## 2022-01-06 PROCEDURE — 96374 THER/PROPH/DIAG INJ IV PUSH: CPT

## 2022-01-06 RX ORDER — DEXAMETHASONE SODIUM PHOSPHATE 4 MG/ML
8 INJECTION, SOLUTION INTRA-ARTICULAR; INTRALESIONAL; INTRAMUSCULAR; INTRAVENOUS; SOFT TISSUE
Status: COMPLETED | OUTPATIENT
Start: 2022-01-06 | End: 2022-01-06

## 2022-01-06 RX ORDER — BUTALBITAL, ACETAMINOPHEN AND CAFFEINE 50; 325; 40 MG/1; MG/1; MG/1
1 TABLET ORAL EVERY 6 HOURS PRN
Qty: 10 TABLET | Refills: 0 | Status: SHIPPED | OUTPATIENT
Start: 2022-01-06 | End: 2023-04-12 | Stop reason: CLARIF

## 2022-01-06 RX ORDER — DIPHENHYDRAMINE HYDROCHLORIDE 50 MG/ML
50 INJECTION INTRAMUSCULAR; INTRAVENOUS
Status: COMPLETED | OUTPATIENT
Start: 2022-01-06 | End: 2022-01-06

## 2022-01-06 RX ORDER — PROCHLORPERAZINE EDISYLATE 5 MG/ML
10 INJECTION INTRAMUSCULAR; INTRAVENOUS
Status: COMPLETED | OUTPATIENT
Start: 2022-01-06 | End: 2022-01-06

## 2022-01-06 RX ADMIN — DIPHENHYDRAMINE HYDROCHLORIDE 50 MG: 50 INJECTION INTRAMUSCULAR; INTRAVENOUS at 04:01

## 2022-01-06 RX ADMIN — DEXAMETHASONE SODIUM PHOSPHATE 8 MG: 4 INJECTION INTRA-ARTICULAR; INTRALESIONAL; INTRAMUSCULAR; INTRAVENOUS; SOFT TISSUE at 04:01

## 2022-01-06 RX ADMIN — SODIUM CHLORIDE 1000 ML: 0.9 INJECTION, SOLUTION INTRAVENOUS at 04:01

## 2022-01-06 RX ADMIN — PROCHLORPERAZINE EDISYLATE 10 MG: 5 INJECTION INTRAMUSCULAR; INTRAVENOUS at 04:01

## 2022-01-06 NOTE — TELEPHONE ENCOUNTER
Pt reports having a severe headache and a stiff neck, Pt advised to go to ED now per protocol, Pt encouraged to call back with any worsening symptoms or questions and verbalized understanding.    Reason for Disposition   Stiff neck (can't touch chin to chest)    Additional Information   Negative: Difficult to awaken or acting confused (e.g., disoriented, slurred speech)   Negative: [1] Weakness of the face, arm or leg on one side of the body AND [2] new-onset   Negative: [1] Numbness of the face, arm or leg on one side of the body AND [2] new-onset   Negative: [1] Loss of speech or garbled speech AND [2] new-onset   Negative: Passed out (i.e., lost consciousness, collapsed and was not responding)   Negative: Sounds like a life-threatening emergency to the triager   Negative: Unable to walk, or can only walk with assistance (e.g., requires support)    Protocols used: HEADACHE-A-AH

## 2022-01-06 NOTE — ED PROVIDER NOTES
Encounter Date: 1/6/2022       History     Chief Complaint   Patient presents with    Migraine     Pt states that she started to have a sharp pain in her head and pain behind her eyes with photophobia x2 days, OTC meds with no relief.      Chief Complaint:  Headache  History of  Present Illness: History obtained from patient. This 46 y.o. female who has past medical history of hypothyroidism and cervical disc disease presents to the ED complaining of right-sided headache that has been gradually worsening since yesterday.  Pain is currently 10/10.  Patient reports associated nausea vomiting and photophobia.  Denies dizziness, weakness, numbness, tingling, neck pain, neck stiffness, fever, chills.  Patient attempted treatment with over-the-counter medications without relief.        Review of patient's allergies indicates:   Allergen Reactions    Morphine Shortness Of Breath    Bactrim [sulfamethoxazole-trimethoprim] Nausea And Vomiting    Demerol [meperidine] Hives    Dilaudid [hydromorphone (bulk)] Hives    Erythromycin Hives    Influenza virus vaccines Hives    Penicillins Hives    Reglan [metoclopramide] Nausea And Vomiting    Toradol [ketorolac] Nausea And Vomiting    Oyster shell Diarrhea and Nausea And Vomiting     Past Medical History:   Diagnosis Date    BRCA negative 11/2014    Cervical disc disease     C5-C6, impacting thecal sac    Gallbladder sludge     Hypothyroidism     Thyroid cancer     Uterine fibroid      Past Surgical History:   Procedure Laterality Date    BREAST BIOPSY Right     exc bx    CHOLECYSTECTOMY  2012    EPIDURAL STEROID INJECTION N/A 9/10/2019    Procedure: Injection, Steroid, Epidural CERVICAL/THORACIC C7-T1 IL JONATHAN;  Surgeon: Rachele Cody MD;  Location: [facility];  Service: Pain Management;  Laterality: N/A;  NEEDS CONSENT    FOOT SURGERY Right     THYROIDECTOMY  2004     Family History   Problem Relation Age of Onset    No Known Problems Mother      Hypertension Father     Diabetes Father     Thyroid cancer Sister     Hodgkin's lymphoma Sister     Cancer Sister         Hodgkin, thyroid, Breast, melanomas    Breast cancer Sister     Leukemia Brother     Cancer Brother         Acute leukemia    Cancer Maternal Grandmother         Breast    Diabetes Maternal Grandmother     Breast cancer Maternal Grandmother 70    Heart disease Maternal Grandfather     Cancer Paternal Grandfather         Lymphoma    Ovarian cancer Neg Hx     Melanoma Neg Hx      Social History     Tobacco Use    Smoking status: Never Smoker    Smokeless tobacco: Never Used   Substance Use Topics    Alcohol use: Yes     Alcohol/week: 2.0 standard drinks     Types: 2 Cans of beer per week     Comment: socially    Drug use: No     Review of Systems   Constitutional: Negative for chills and fever.   HENT: Negative for congestion, rhinorrhea and sore throat.    Eyes: Positive for photophobia. Negative for visual disturbance.   Respiratory: Negative for cough and shortness of breath.    Cardiovascular: Negative for chest pain.   Gastrointestinal: Positive for nausea and vomiting. Negative for abdominal pain and diarrhea.   Genitourinary: Negative for dysuria, frequency and hematuria.   Musculoskeletal: Negative for back pain.   Skin: Negative for rash.   Neurological: Positive for headaches. Negative for dizziness and weakness.       Physical Exam     Initial Vitals [01/06/22 0125]   BP Pulse Resp Temp SpO2   128/77 (!) 112 18 99 °F (37.2 °C) 97 %      MAP       --         Physical Exam    Nursing note and vitals reviewed.  Constitutional: She appears well-developed and well-nourished. No distress.   HENT:   Head: Normocephalic and atraumatic.   Right Ear: Tympanic membrane normal.   Left Ear: Tympanic membrane normal.   Nose: Nose normal.   Mouth/Throat: Uvula is midline, oropharynx is clear and moist and mucous membranes are normal.   Eyes: EOM are normal. Pupils are equal, round,  and reactive to light.   Neck: Trachea normal and phonation normal. Neck supple. No stridor present.   Normal range of motion.   Full passive range of motion without pain.     Cardiovascular: Normal rate, regular rhythm and normal heart sounds. Exam reveals no gallop and no friction rub.    No murmur heard.  Pulmonary/Chest: Effort normal and breath sounds normal. No respiratory distress. She has no wheezes. She has no rhonchi. She has no rales.   Abdominal: Abdomen is soft. Bowel sounds are normal. There is no abdominal tenderness. There is no rebound and no guarding.   Musculoskeletal:         General: Normal range of motion.      Cervical back: Full passive range of motion without pain, normal range of motion and neck supple. No rigidity. No spinous process tenderness or muscular tenderness. Normal range of motion.     Neurological: She is alert and oriented to person, place, and time. She has normal strength. No cranial nerve deficit or sensory deficit.   Skin: Skin is warm and dry. Capillary refill takes less than 2 seconds.   Psychiatric: She has a normal mood and affect.         ED Course   Procedures  Labs Reviewed - No data to display       Imaging Results    None          Medications   sodium chloride 0.9% bolus 1,000 mL (1,000 mLs Intravenous New Bag 1/6/22 0422)   prochlorperazine injection Soln 10 mg (10 mg Intravenous Given 1/6/22 0423)   diphenhydrAMINE injection 50 mg (50 mg Intravenous Given 1/6/22 0423)   dexamethasone injection 8 mg (8 mg Intravenous Given 1/6/22 0423)     Medical Decision Making:   ED Management:  This is an evaluation of a 46 y.o. female that presents to the Emergency Department for headache. The patient is a non-toxic, afebrile, and well appearing female. On physical exam: she is AAO, has no focal weakness or neurological deficits. Has full ROM of neck with no nuchal rigidity or meningeal signs.  There is no staggering or ataxic gait, vomiting, double vision, visual loss,  slurred speech, numbness of the face or body, weakness, clumsiness, or incoordination. No external signs of head injury or trauma. No tenderness or induration over the temples. she reports NO: history of malignancy, syncope, current nor recent pregnancy, or seizures associated with this headache. she is not immunocompromised.     Vital Signs are Reassuring.     Given the above findings, my overall impression is headache. Differential Diagnosis included but was not limited to: SAH, epidural hematoma, subdural hematoma, CVA, temporal arteritis, migraine headache, meningitis acute angle glaucoma    Patient in the ED with IV fluids, Compazine and Benadryl with improvement of headache.  The diagnosis, treatment plan, instructions for follow-up and reevaluation with PCP as well as ED return precautions have been discussed with the patient and the patient has verbalized an understanding of the information. All questions or concerns have been addressed.               ED Course as of 01/06/22 0542   Thu Jan 06, 2022   0512 On re-evaluation, patient sleeping on exam bed.  Patient easily aroused and reported significant improvement of headache after treatment in the ED. [AN]      ED Course User Index  [AN] Carl Wallace PA-C             Clinical Impression:   Final diagnoses:  [R51.9] Acute nonintractable headache, unspecified headache type (Primary)          ED Disposition Condition    Discharge Stable        ED Prescriptions     Medication Sig Dispense Start Date End Date Auth. Provider    butalbital-acetaminophen-caffeine -40 mg (FIORICET, ESGIC) -40 mg per tablet Take 1 tablet by mouth every 6 (six) hours as needed for Pain. 10 tablet 1/6/2022  Carl Wallace PA-C        Follow-up Information     Follow up With Specialties Details Why Contact Info    Herman Rivera MD Internal Medicine   [address]  [place] [address]  [phone]      [facility] Emergency Medicine Go in 1 day If  symptoms worsen [address] [place], [place] [address] [id number] [phone]           Carl Wallace PA-C  01/06/22 0545

## 2022-01-14 ENCOUNTER — TELEPHONE (OUTPATIENT)
Dept: INTERNAL MEDICINE | Facility: CLINIC | Age: 47
End: 2022-01-14
Payer: OTHER GOVERNMENT

## 2022-01-14 NOTE — TELEPHONE ENCOUNTER
Hi, please call her does she have a specific question/need for followup? If so I could do a video visit with her next week 1/21.  Thank you, Herman Rivera

## 2022-01-31 ENCOUNTER — PATIENT OUTREACH (OUTPATIENT)
Dept: ADMINISTRATIVE | Facility: OTHER | Age: 47
End: 2022-01-31
Payer: OTHER GOVERNMENT

## 2022-02-01 NOTE — PROGRESS NOTES
LINKS immunization registry updated  Care Everywhere updated  Health Maintenance updated  Chart reviewed for overdue Proactive [facility] Encounters ([facility]) health maintenance testing (CRS, Breast Ca, Diabetic Eye Exam)   Orders entered:N/A

## 2022-02-02 ENCOUNTER — OFFICE VISIT (OUTPATIENT)
Dept: SURGERY | Facility: CLINIC | Age: 47
End: 2022-02-02
Payer: OTHER GOVERNMENT

## 2022-02-02 VITALS
DIASTOLIC BLOOD PRESSURE: 65 MMHG | HEART RATE: 72 BPM | SYSTOLIC BLOOD PRESSURE: 131 MMHG | WEIGHT: 217.5 LBS | BODY MASS INDEX: 37.13 KG/M2 | HEIGHT: 64 IN

## 2022-02-02 DIAGNOSIS — D17.1 LIPOMA OF TORSO: Primary | ICD-10-CM

## 2022-02-02 PROCEDURE — 99203 OFFICE O/P NEW LOW 30 MIN: CPT | Mod: S$PBB,,, | Performed by: SURGERY

## 2022-02-02 PROCEDURE — 99203 PR OFFICE/OUTPT VISIT, NEW, LEVL III, 30-44 MIN: ICD-10-PCS | Mod: S$PBB,,, | Performed by: SURGERY

## 2022-02-02 PROCEDURE — 99999 PR PBB SHADOW E&M-EST. PATIENT-LVL IV: ICD-10-PCS | Mod: PBBFAC,,, | Performed by: SURGERY

## 2022-02-02 PROCEDURE — 99214 OFFICE O/P EST MOD 30 MIN: CPT | Mod: PBBFAC | Performed by: SURGERY

## 2022-02-02 PROCEDURE — 99999 PR PBB SHADOW E&M-EST. PATIENT-LVL IV: CPT | Mod: PBBFAC,,, | Performed by: SURGERY

## 2022-02-02 NOTE — PROGRESS NOTES
Subjective:      Kellen Fraser is a 46 y.o. year old female hx hypothyroidism(papillary thyroid cancer 2004), who presents to the [facility] on 02/02/2022 for subcutaneous mass. She noticed the mass in November 2021 when undergoing a PT adjustment for right hip pain. The mass is on her right lateral lower abdomen/flank. It causes her discomfort intermittently when she moves into different positions. She also notices it causes her pain when she a big meal and her stomach is distended. The mass has never had any overlying skin changes or drainage. She would like it removed.     Surg Hx: Right breast lumpectomy, lap cholecystectomy, right foot surgery, thyroidectomy  Meds: No blood thinners     Vitals:    02/02/22 1529   BP: 131/65   Pulse: 72        Patient Active Problem List   Diagnosis    Post-surgical hypothyroidism    History of thyroid cancer    Right thigh pain    Breast mass    Chronic pain    Family history of breast cancer -- sister    Decreased range of motion of lumbar spine    Decreased strength of trunk and back    Poor posture       Current Outpatient Medications   Medication Sig Dispense Refill    ALBUTEROL INHL       butalbital-acetaminophen-caffeine -40 mg (FIORICET, ESGIC) -40 mg per tablet Take 1 tablet by mouth every 6 (six) hours as needed for Pain. 10 tablet 0    levothyroxine (SYNTHROID) 200 MCG tablet Take 1 tablet (200 mcg total) by mouth once daily. 30 tablet 11    LIDOcaine HCL 2% (XYLOCAINE) 2 % jelly Apply topically as needed. Apply topically once nightly to affected part of foot/feet. 30 mL 2    LIDOcaine HCL 2% (XYLOCAINE) 2 % jelly Apply topically as needed. Apply topically once nightly to affected part of foot/feet. 30 mL 2    meloxicam (MOBIC) 15 MG tablet Take 1 tablet (15 mg total) by mouth daily as needed for Pain. 90 tablet 3    methocarbamoL (ROBAXIN) 500 MG Tab Take 1 tablet (500 mg total) by mouth 4 (four) times daily. 120 tablet 2     norgestimate-ethinyl estradioL (ORTHO-CYCLEN) 0.25-35 mg-mcg per tablet Take 1 tablet by mouth once daily. (Patient not taking: Reported on 2/2/2022) 90 tablet 3     Current Facility-Administered Medications   Medication Dose Route Frequency Provider Last Rate Last Admin    0.9%  NaCl infusion   Intravenous 1 time in Clinic/HOD Alireza Smith MD        aluminum & magnesium hydroxide-simethicone 400-400-40 mg/5 mL suspension 30 mL  30 mL Oral Q6H PRN Aileen Vargas NP   30 mL at 01/07/19 1049       Review of Systems:  10+ review of systems negative except: +right foot pain  Objective:     Physical Exam:  General: No acute distress  HENT: Atraumatic   Resp; No resp distress, normal chest movements  Cardiac: Normal rate  Abd: sift, NT, ND. Soft, mobile 3cm mass noted on right lateral abd/flank. No skin changes or drainage  Skin: Warm, dry  Extremities: Right foot boot in place   Neuro: AOx4    Assessment:   46 F with right abd/flank subcutaneous mass    Plan:   Plan for excision in minors clinic  Consent obtained. Risks and benefits discussed.

## 2022-02-04 ENCOUNTER — HOSPITAL ENCOUNTER (OUTPATIENT)
Dept: RADIOLOGY | Facility: OTHER | Age: 47
Discharge: HOME | End: 2022-02-04
Attending: PODIATRIST
Payer: OTHER GOVERNMENT

## 2022-02-04 DIAGNOSIS — M77.31 CALCANEAL SPUR OF RIGHT FOOT: ICD-10-CM

## 2022-02-04 DIAGNOSIS — M72.2 PLANTAR FASCIITIS: ICD-10-CM

## 2022-02-04 DIAGNOSIS — M77.30 CALCANEAL SPUR, UNSPECIFIED LATERALITY: ICD-10-CM

## 2022-02-04 PROCEDURE — 73718 MRI FOOT (HINDFOOT) RIGHT WITHOUT CONTRAST: ICD-10-PCS | Mod: 26,RT,, | Performed by: RADIOLOGY

## 2022-02-04 PROCEDURE — 73718 MRI LOWER EXTREMITY W/O DYE: CPT | Mod: 26,RT,, | Performed by: RADIOLOGY

## 2022-02-04 PROCEDURE — 73718 MRI LOWER EXTREMITY W/O DYE: CPT | Mod: TC,RT

## 2022-02-07 ENCOUNTER — PROCEDURE VISIT (OUTPATIENT)
Dept: OBSTETRICS AND GYNECOLOGY | Facility: CLINIC | Age: 47
End: 2022-02-07
Payer: OTHER GOVERNMENT

## 2022-02-07 ENCOUNTER — LAB VISIT (OUTPATIENT)
Dept: LAB | Facility: OTHER | Age: 47
End: 2022-02-07
Attending: OBSTETRICS & GYNECOLOGY
Payer: OTHER GOVERNMENT

## 2022-02-07 VITALS
DIASTOLIC BLOOD PRESSURE: 80 MMHG | SYSTOLIC BLOOD PRESSURE: 110 MMHG | WEIGHT: 217.81 LBS | BODY MASS INDEX: 37.19 KG/M2 | HEIGHT: 64 IN

## 2022-02-07 DIAGNOSIS — E03.9 HYPOTHYROIDISM, UNSPECIFIED TYPE: Primary | ICD-10-CM

## 2022-02-07 DIAGNOSIS — N93.9 ABNORMAL UTERINE BLEEDING (AUB): ICD-10-CM

## 2022-02-07 DIAGNOSIS — E03.9 HYPOTHYROIDISM, UNSPECIFIED TYPE: ICD-10-CM

## 2022-02-07 LAB — TSH SERPL DL<=0.005 MIU/L-ACNC: 1.84 UIU/ML (ref 0.4–4)

## 2022-02-07 PROCEDURE — 88305 TISSUE EXAM BY PATHOLOGIST: CPT | Performed by: PATHOLOGY

## 2022-02-07 PROCEDURE — 84443 ASSAY THYROID STIM HORMONE: CPT | Performed by: OBSTETRICS & GYNECOLOGY

## 2022-02-07 PROCEDURE — 88305 TISSUE EXAM BY PATHOLOGIST: CPT | Mod: 26,,, | Performed by: PATHOLOGY

## 2022-02-07 PROCEDURE — 58100 BIOPSY (GYNECOLOGICAL): ICD-10-PCS | Mod: S$PBB,,, | Performed by: OBSTETRICS & GYNECOLOGY

## 2022-02-07 PROCEDURE — 99499 NO LOS: ICD-10-PCS | Mod: S$PBB,,, | Performed by: OBSTETRICS & GYNECOLOGY

## 2022-02-07 PROCEDURE — 88305 TISSUE EXAM BY PATHOLOGIST: ICD-10-PCS | Mod: 26,,, | Performed by: PATHOLOGY

## 2022-02-07 PROCEDURE — 58100 BIOPSY OF UTERUS LINING: CPT | Mod: PBBFAC | Performed by: OBSTETRICS & GYNECOLOGY

## 2022-02-07 PROCEDURE — 99499 UNLISTED E&M SERVICE: CPT | Mod: S$PBB,,, | Performed by: OBSTETRICS & GYNECOLOGY

## 2022-02-07 PROCEDURE — 36415 COLL VENOUS BLD VENIPUNCTURE: CPT | Performed by: OBSTETRICS & GYNECOLOGY

## 2022-02-07 RX ORDER — LEVOTHYROXINE SODIUM 200 UG/1
200 TABLET ORAL DAILY
Qty: 90 TABLET | Refills: 3 | Status: SHIPPED | OUTPATIENT
Start: 2022-02-07 | End: 2023-05-01

## 2022-02-08 NOTE — PROCEDURES
Biopsy (Gynecological)    Date/Time: 2/7/2022 1:15 PM  Performed by: Mara Linares MD  Authorized by: Mara Linares MD     Consent Done?:  Yes (Written)   Patient was prepped and draped in the normal sterile fashion.  Local anesthesia used?: No      Biopsy Location:  Uterus  Estimated blood loss (cc):  0     Cervix swabbed with betadine x 3. Three passes of endometrial pipelle with moderate tissue. Tenaculum removed, hemostasis noted. Speculum removed.    TSH repeated.   Patient will restart OCPs. She had some abdominal pain after starting previously but also tested positive for COVID so unsure if these symptoms were related.   Declines trial of IUD.  Discussed surgical management with D&C/ablation or hysterectomy and patient will consider.

## 2022-02-11 LAB
FINAL PATHOLOGIC DIAGNOSIS: NORMAL
GROSS: NORMAL
Lab: NORMAL

## 2022-02-23 ENCOUNTER — PROCEDURE VISIT (OUTPATIENT)
Dept: SURGERY | Facility: CLINIC | Age: 47
End: 2022-02-23
Payer: OTHER GOVERNMENT

## 2022-02-23 VITALS
DIASTOLIC BLOOD PRESSURE: 59 MMHG | HEART RATE: 79 BPM | BODY MASS INDEX: 37.39 KG/M2 | SYSTOLIC BLOOD PRESSURE: 126 MMHG | HEIGHT: 64 IN

## 2022-02-23 DIAGNOSIS — D17.1 LIPOMA OF TORSO: Primary | ICD-10-CM

## 2022-02-23 PROCEDURE — 88304 PR  SURG PATH,LEVEL III: ICD-10-PCS | Mod: 26,,, | Performed by: STUDENT IN AN ORGANIZED HEALTH CARE EDUCATION/TRAINING PROGRAM

## 2022-02-23 PROCEDURE — 21930 EXC BACK LES SC < 3 CM: CPT | Mod: S$PBB,,, | Performed by: SURGERY

## 2022-02-23 PROCEDURE — 21930 PR EXCISION TUMOR SOFT TISSUE BACK/FLANK SUBQ <3CM: ICD-10-PCS | Mod: S$PBB,,, | Performed by: SURGERY

## 2022-02-23 PROCEDURE — 88304 TISSUE EXAM BY PATHOLOGIST: CPT | Mod: 26,,, | Performed by: STUDENT IN AN ORGANIZED HEALTH CARE EDUCATION/TRAINING PROGRAM

## 2022-02-23 PROCEDURE — 88304 TISSUE EXAM BY PATHOLOGIST: CPT | Performed by: STUDENT IN AN ORGANIZED HEALTH CARE EDUCATION/TRAINING PROGRAM

## 2022-02-23 PROCEDURE — 21930 EXC BACK LES SC < 3 CM: CPT | Mod: PBBFAC | Performed by: SURGERY

## 2022-02-23 NOTE — PROCEDURES
"Kellen Fraser is a 46 y.o. female patient.    Pulse: 79 (02/23/22 1446)  BP: (!) 126/59 (02/23/22 1446)  Weight:  (unable boot) (02/23/22 1446)  Height: 5' 4" (162.6 cm) (02/23/22 1446)       Exc, Cyst    Date/Time: 2/23/2022 3:31 PM  Performed by: Patrice Delcid MD  Authorized by: Patrice Delcid MD     Consent Done?:  Yes (Written)  Time out: Immediately prior to procedure a "time out" was called to verify the correct patient, procedure, equipment, support staff and site/side marked as required.    INDICATIONS:lipoma  LOCATION:  Location: Right flank.(Right)    PREP:  Position:  Lateral    ANESTHESIA:  Anesthesia:  Local infiltration  Local anesthetic:  Lidocaine 1% with epinephrine    PROCEDURE DETAILS:  Excision size (cm):  3  Scalpel size:  15  Incision type:  Single straight  Specimens?: Yes    Wound closure:  Intermediate layered  Sutures:  3-0 Vicryl and 4-0 Monocryl  Sterile dressings:  Gauze and Tegaderm (Dermabond)  Needle, instrument, and sponge counts were correct.  Patient tolerated the procedure well with no immediate complications.  Post-operative instructions were provided for the patient.        2/23/2022  "

## 2022-03-04 LAB
FINAL PATHOLOGIC DIAGNOSIS: NORMAL
Lab: NORMAL

## 2022-03-09 ENCOUNTER — OFFICE VISIT (OUTPATIENT)
Dept: SURGERY | Facility: CLINIC | Age: 47
End: 2022-03-09
Payer: OTHER GOVERNMENT

## 2022-03-09 VITALS
WEIGHT: 224 LBS | HEIGHT: 64 IN | DIASTOLIC BLOOD PRESSURE: 64 MMHG | BODY MASS INDEX: 38.24 KG/M2 | HEART RATE: 92 BPM | SYSTOLIC BLOOD PRESSURE: 145 MMHG

## 2022-03-09 DIAGNOSIS — D17.1 LIPOMA OF TORSO: Primary | ICD-10-CM

## 2022-03-09 PROCEDURE — 99999 PR PBB SHADOW E&M-EST. PATIENT-LVL II: ICD-10-PCS | Mod: PBBFAC,,, | Performed by: SURGERY

## 2022-03-09 PROCEDURE — 99999 PR PBB SHADOW E&M-EST. PATIENT-LVL II: CPT | Mod: PBBFAC,,, | Performed by: SURGERY

## 2022-03-09 PROCEDURE — 99212 OFFICE O/P EST SF 10 MIN: CPT | Mod: PBBFAC | Performed by: SURGERY

## 2022-03-09 PROCEDURE — 99024 POSTOP FOLLOW-UP VISIT: CPT | Mod: ,,, | Performed by: SURGERY

## 2022-03-09 PROCEDURE — 99024 PR POST-OP FOLLOW-UP VISIT: ICD-10-PCS | Mod: ,,, | Performed by: SURGERY

## 2022-03-09 NOTE — PROGRESS NOTES
Subjective: Patient doing well post op. She had a right flank lipoma removed.  Activity returning to normal.  Pain well controlled not needing narcotics.  Tolerating a diet without nausea or vomiting.  Having normal regular bowel movements.  Incision healing well without drainage.    Objective:   There were no vitals filed for this visit.    Incision sites clean, dry, and intact.  No erythema or drainage.  Abdomen soft and nontender.   Pathology: Mature adipose tissue, consistent with lipoma    Assessment: Kellen Fraser is our 46 y.o. female s/p right flank lipoma excision who has had an uncomplicated post operative course.    Plan:  - Can follow up as needed  - Return to normal activity  -Call with any questions or problems

## 2022-07-06 ENCOUNTER — PATIENT MESSAGE (OUTPATIENT)
Dept: PODIATRY | Facility: CLINIC | Age: 47
End: 2022-07-06
Payer: OTHER GOVERNMENT

## 2022-07-12 ENCOUNTER — APPOINTMENT (OUTPATIENT)
Dept: RADIOLOGY | Facility: OTHER | Age: 47
End: 2022-07-12
Attending: PODIATRIST
Payer: OTHER GOVERNMENT

## 2022-07-12 ENCOUNTER — OFFICE VISIT (OUTPATIENT)
Dept: PODIATRY | Facility: CLINIC | Age: 47
End: 2022-07-12
Payer: OTHER GOVERNMENT

## 2022-07-12 VITALS
SYSTOLIC BLOOD PRESSURE: 144 MMHG | HEART RATE: 73 BPM | DIASTOLIC BLOOD PRESSURE: 81 MMHG | BODY MASS INDEX: 38.24 KG/M2 | WEIGHT: 224 LBS | HEIGHT: 64 IN

## 2022-07-12 DIAGNOSIS — S93.691A RUPTURE OF PLANTAR FASCIA OF RIGHT FOOT, INITIAL ENCOUNTER: Primary | ICD-10-CM

## 2022-07-12 DIAGNOSIS — G89.29 CHRONIC HEEL PAIN, RIGHT: ICD-10-CM

## 2022-07-12 DIAGNOSIS — M79.671 CHRONIC HEEL PAIN, RIGHT: ICD-10-CM

## 2022-07-12 DIAGNOSIS — M24.573 EQUINUS CONTRACTURE OF ANKLE: ICD-10-CM

## 2022-07-12 DIAGNOSIS — S93.691A RUPTURE OF PLANTAR FASCIA OF RIGHT FOOT, INITIAL ENCOUNTER: ICD-10-CM

## 2022-07-12 PROCEDURE — 73650 X-RAY EXAM OF HEEL: CPT | Mod: 26,RT,, | Performed by: RADIOLOGY

## 2022-07-12 PROCEDURE — 99214 PR OFFICE/OUTPT VISIT, EST, LEVL IV, 30-39 MIN: ICD-10-PCS | Mod: 25,S$PBB,, | Performed by: PODIATRIST

## 2022-07-12 PROCEDURE — 73650 XR CALCANEUS 2 VIEW RIGHT: ICD-10-PCS | Mod: 26,RT,, | Performed by: RADIOLOGY

## 2022-07-12 PROCEDURE — 29540 PR STRAPPING; ANKLE &/OR FOOT: ICD-10-PCS | Mod: S$PBB,RT,, | Performed by: PODIATRIST

## 2022-07-12 PROCEDURE — 99999 PR PBB SHADOW E&M-EST. PATIENT-LVL IV: ICD-10-PCS | Mod: PBBFAC,,, | Performed by: PODIATRIST

## 2022-07-12 PROCEDURE — 29540 STRAPPING ANKLE &/FOOT: CPT | Mod: PBBFAC,PN,RT | Performed by: PODIATRIST

## 2022-07-12 PROCEDURE — 99214 OFFICE O/P EST MOD 30 MIN: CPT | Mod: 25,S$PBB,, | Performed by: PODIATRIST

## 2022-07-12 PROCEDURE — 73650 X-RAY EXAM OF HEEL: CPT | Mod: TC,RT

## 2022-07-12 PROCEDURE — 99214 OFFICE O/P EST MOD 30 MIN: CPT | Mod: PBBFAC,PN,25 | Performed by: PODIATRIST

## 2022-07-12 PROCEDURE — 99999 PR PBB SHADOW E&M-EST. PATIENT-LVL IV: CPT | Mod: PBBFAC,,, | Performed by: PODIATRIST

## 2022-07-12 PROCEDURE — 29540 STRAPPING ANKLE &/FOOT: CPT | Mod: S$PBB,RT,, | Performed by: PODIATRIST

## 2022-07-12 RX ORDER — MELOXICAM 15 MG/1
15 TABLET ORAL DAILY
Qty: 30 TABLET | Refills: 0 | Status: SHIPPED | OUTPATIENT
Start: 2022-07-12 | End: 2023-04-19

## 2022-07-12 NOTE — PROGRESS NOTES
Subjective:      Patient ID: Kellen Fraser is a 47 y.o. female.    Chief Complaint: Heel Pain (Right Foot)    Sharp deep pain bottom of the right heel and arch.  Rapid onset stepping down plane coronal hold few days ago.  She heard a pop and had intense pain in the bottom of the heel and arch.  Aggravated by increased weight-bearing prolonged standing some shoes.  No prior medical treatment.  Self-treatment with fracture boot knee walker help some.    Review of Systems   Constitutional: Negative for chills, diaphoresis, fever, malaise/fatigue and night sweats.   Cardiovascular: Negative for claudication, cyanosis, leg swelling and syncope.   Skin: Negative for color change, dry skin, nail changes, rash, suspicious lesions and unusual hair distribution.   Musculoskeletal: Negative for falls, joint pain, joint swelling, muscle cramps, muscle weakness and stiffness.   Gastrointestinal: Negative for constipation, diarrhea, nausea and vomiting.   Neurological: Negative for brief paralysis, disturbances in coordination, focal weakness, numbness, paresthesias, sensory change and tremors.           Objective:      Physical Exam  Constitutional:       General: She is not in acute distress.     Appearance: She is well-developed. She is not diaphoretic.   Cardiovascular:      Pulses:           Popliteal pulses are 2+ on the right side and 2+ on the left side.        Dorsalis pedis pulses are 2+ on the right side and 2+ on the left side.        Posterior tibial pulses are 2+ on the right side and 2+ on the left side.      Comments: Capillary refill 3 seconds all toes/distal feet, all toes/both feet warm to touch.      Negative lymphadenopathy bilateral popliteal fossa and tarsal tunnel.      Negavie lower extremity edema bilateral.    Musculoskeletal:      Right ankle: No swelling, deformity, ecchymosis or lacerations. Normal range of motion. Normal pulse.      Right Achilles Tendon: Normal. No defects. Fink's test  negative.      Comments: Sharp deep pain to palpation inferior heel right at medial calcaneal tubercle without ecchymosis, erythema, edema, or cardinal signs infection, and no signs of trauma.    Ankle dorsiflexion decreased at <10 degrees bilateral with moderate increase with knee flexion bilateral.    Otherwise, Normal angle, base, station of gait. All ten toes without clubbing, cyanosis, or signs of ischemia.  No pain to palpation bilateral lower extremities.  Range of motion, stability, muscle strength, and muscle tone normal bilateral feet and legs.    Lymphadenopathy:      Lower Body: No right inguinal adenopathy. No left inguinal adenopathy.      Comments: Negative lymphadenopathy bilateral popliteal fossa and tarsal tunnel.    Negative lymphangitic streaking bilateral feet/ankles/legs.   Skin:     General: Skin is warm and dry.      Capillary Refill: Capillary refill takes 2 to 3 seconds.      Coloration: Skin is not pale.      Findings: No abrasion, bruising, burn, ecchymosis, erythema, laceration, lesion or rash.      Nails: There is no clubbing.      Comments: Skin is normal age and health appropriate color, turgor, texture, and temperature bilateral lower extremities without ulceration, hyperpigmentation, discoloration, masses nodules or cords palpated.  No ecchymosis, erythema, edema, or cardinal signs of infection bilateral lower extremities.       Neurological:      Mental Status: She is alert and oriented to person, place, and time.      Sensory: No sensory deficit.      Motor: No tremor, atrophy or abnormal muscle tone.      Gait: Gait normal.      Deep Tendon Reflexes:      Reflex Scores:       Patellar reflexes are 2+ on the right side and 2+ on the left side.       Achilles reflexes are 2+ on the right side and 2+ on the left side.     Comments: Negative tinel sign to percussion sural, superficial peroneal, deep peroneal, saphenous, and posterior tibial nerves right and left ankles and feet.      Psychiatric:         Behavior: Behavior is cooperative.               Assessment:       Encounter Diagnoses   Name Primary?    Rupture of plantar fascia of right foot, initial encounter Yes    Chronic heel pain, right     Equinus contracture of ankle          Plan:       Kellen was seen today for heel pain.    Diagnoses and all orders for this visit:    Rupture of plantar fascia of right foot, initial encounter  -     X-Ray Calcaneus 2 View Right; Future    Chronic heel pain, right  -     X-Ray Calcaneus 2 View Right; Future    Equinus contracture of ankle  -     X-Ray Calcaneus 2 View Right; Future    Other orders  -     meloxicam (MOBIC) 15 MG tablet; Take 1 tablet (15 mg total) by mouth once daily.      I counseled the patient on her conditions, their implications and medical management.    RIICE    Patient will stretch the tendo achilles complex three times daily as demonstrated in the office.  Literature was dispensed illustrating proper stretching technique.    I applied a plantar rest strapping to the patient's right foot to offload symptomatic area, support the arch, and relieve pain.    Patient will obtain over the counter arch supports and wear them in shoes whenever possible.  Athletic shoes intended for walking or running are usually best.    The patient was advised that NSAID-type medications have two very important potential side effects: gastrointestinal irritation including hemorrhage and renal injuries. She was asked to take the medication with food and to stop if she experiences any GI upset. I asked her to call for vomiting, abdominal pain or black/bloody stools. The patient expresses understanding of these issues and questions were answered.    Discussed conservative treatment with shoes of adequate dimensions, material, and style to alleviate symptoms and delay or prevent surgical intervention.    fx boot right - ambulate to tolerance weaning to shoes as symptoms allow.    X-rays right  foot    Meloxicam          Follow up in about 1 month (around 8/12/2022).

## 2022-08-12 ENCOUNTER — OFFICE VISIT (OUTPATIENT)
Dept: PODIATRY | Facility: CLINIC | Age: 47
End: 2022-08-12
Payer: OTHER GOVERNMENT

## 2022-08-12 VITALS
HEART RATE: 85 BPM | DIASTOLIC BLOOD PRESSURE: 59 MMHG | WEIGHT: 224 LBS | SYSTOLIC BLOOD PRESSURE: 127 MMHG | BODY MASS INDEX: 38.24 KG/M2 | HEIGHT: 64 IN

## 2022-08-12 DIAGNOSIS — M72.2 PLANTAR FASCIITIS: ICD-10-CM

## 2022-08-12 DIAGNOSIS — M77.30 CALCANEAL SPUR, UNSPECIFIED LATERALITY: ICD-10-CM

## 2022-08-12 DIAGNOSIS — G89.29 CHRONIC HEEL PAIN, RIGHT: ICD-10-CM

## 2022-08-12 DIAGNOSIS — M79.671 CHRONIC HEEL PAIN, RIGHT: ICD-10-CM

## 2022-08-12 DIAGNOSIS — M24.573 EQUINUS CONTRACTURE OF ANKLE: ICD-10-CM

## 2022-08-12 DIAGNOSIS — S93.691A RUPTURE OF PLANTAR FASCIA OF RIGHT FOOT, INITIAL ENCOUNTER: Primary | ICD-10-CM

## 2022-08-12 PROCEDURE — 99214 OFFICE O/P EST MOD 30 MIN: CPT | Mod: PBBFAC,PN | Performed by: PODIATRIST

## 2022-08-12 PROCEDURE — 99213 OFFICE O/P EST LOW 20 MIN: CPT | Mod: S$PBB,,, | Performed by: PODIATRIST

## 2022-08-12 PROCEDURE — 99999 PR PBB SHADOW E&M-EST. PATIENT-LVL IV: ICD-10-PCS | Mod: PBBFAC,,, | Performed by: PODIATRIST

## 2022-08-12 PROCEDURE — 99999 PR PBB SHADOW E&M-EST. PATIENT-LVL IV: CPT | Mod: PBBFAC,,, | Performed by: PODIATRIST

## 2022-08-12 PROCEDURE — 99213 PR OFFICE/OUTPT VISIT, EST, LEVL III, 20-29 MIN: ICD-10-PCS | Mod: S$PBB,,, | Performed by: PODIATRIST

## 2022-08-12 NOTE — PROGRESS NOTES
Subjective:      Patient ID: Kellen Fraser is a 47 y.o. female.    Chief Complaint: Heel Pain    Sharp deep pain bottom of the right heel and arch.  Rapid onset stepping down about a month a go.  She heard a pop and had intense pain in the bottom of the heel and arch.  Aggravated by increased weight-bearing prolonged standing some shoes.  Fracture boot ambulation, knee since pre pop.  Self-treatment with fracture boot knee walker help some.  X-rays of the heel last visit show no acute osseous abnormality right heel.    Review of Systems   Constitutional: Negative for chills, diaphoresis, fever, malaise/fatigue and night sweats.   Cardiovascular: Negative for claudication, cyanosis, leg swelling and syncope.   Skin: Negative for color change, dry skin, nail changes, rash, suspicious lesions and unusual hair distribution.   Musculoskeletal: Negative for falls, joint pain, joint swelling, muscle cramps, muscle weakness and stiffness.   Gastrointestinal: Negative for constipation, diarrhea, nausea and vomiting.   Neurological: Negative for brief paralysis, disturbances in coordination, focal weakness, numbness, paresthesias, sensory change and tremors.           Objective:      Physical Exam  Constitutional:       General: She is not in acute distress.     Appearance: She is well-developed. She is not diaphoretic.   Cardiovascular:      Pulses:           Popliteal pulses are 2+ on the right side and 2+ on the left side.        Dorsalis pedis pulses are 2+ on the right side and 2+ on the left side.        Posterior tibial pulses are 2+ on the right side and 2+ on the left side.      Comments: Capillary refill 3 seconds all toes/distal feet, all toes/both feet warm to touch.      Negative lymphadenopathy bilateral popliteal fossa and tarsal tunnel.      Negavie lower extremity edema bilateral.    Musculoskeletal:      Right ankle: No swelling, deformity, ecchymosis or lacerations. Normal range of motion. Normal pulse.       Right Achilles Tendon: Normal. No defects. Fink's test negative.      Comments: Sharp deep pain to palpation inferior heel right at medial calcaneal tubercle without ecchymosis, erythema, edema, or cardinal signs infection, and no signs of trauma.    Ankle dorsiflexion decreased at <10 degrees bilateral with moderate increase with knee flexion bilateral.    Otherwise, Normal angle, base, station of gait. All ten toes without clubbing, cyanosis, or signs of ischemia.  No pain to palpation bilateral lower extremities.  Range of motion, stability, muscle strength, and muscle tone normal bilateral feet and legs.    Lymphadenopathy:      Lower Body: No right inguinal adenopathy. No left inguinal adenopathy.      Comments: Negative lymphadenopathy bilateral popliteal fossa and tarsal tunnel.    Negative lymphangitic streaking bilateral feet/ankles/legs.   Skin:     General: Skin is warm and dry.      Capillary Refill: Capillary refill takes 2 to 3 seconds.      Coloration: Skin is not pale.      Findings: No abrasion, bruising, burn, ecchymosis, erythema, laceration, lesion or rash.      Nails: There is no clubbing.      Comments: Skin is normal age and health appropriate color, turgor, texture, and temperature bilateral lower extremities without ulceration, hyperpigmentation, discoloration, masses nodules or cords palpated.  No ecchymosis, erythema, edema, or cardinal signs of infection bilateral lower extremities.       Neurological:      Mental Status: She is alert and oriented to person, place, and time.      Sensory: No sensory deficit.      Motor: No tremor, atrophy or abnormal muscle tone.      Gait: Gait normal.      Deep Tendon Reflexes:      Reflex Scores:       Patellar reflexes are 2+ on the right side and 2+ on the left side.       Achilles reflexes are 2+ on the right side and 2+ on the left side.     Comments: Negative tinel sign to percussion sural, superficial peroneal, deep peroneal, saphenous, and  posterior tibial nerves right and left ankles and feet.     Psychiatric:         Behavior: Behavior is cooperative.               Assessment:       Encounter Diagnoses   Name Primary?    Rupture of plantar fascia of right foot, initial encounter Yes    Chronic heel pain, right     Equinus contracture of ankle     Calcaneal spur, unspecified laterality     Plantar fasciitis          Plan:       Kellen was seen today for heel pain.    Diagnoses and all orders for this visit:    Rupture of plantar fascia of right foot, initial encounter  -     Ambulatory referral/consult to Physical/Occupational Therapy; Future  -     CRUTCHES FOR HOME USE    Chronic heel pain, right  -     Ambulatory referral/consult to Physical/Occupational Therapy; Future  -     CRUTCHES FOR HOME USE    Equinus contracture of ankle  -     Ambulatory referral/consult to Physical/Occupational Therapy; Future  -     CRUTCHES FOR HOME USE    Calcaneal spur, unspecified laterality  -     CRUTCHES FOR HOME USE    Plantar fasciitis  -     CRUTCHES FOR HOME USE      I counseled the patient on her conditions, their implications and medical management.    Continue stretches, inserts, ambulation in the fracture boot to tolerance, knee walker as needed.      Physical therapy.      Continue over-the-counter NSAIDs p.r.n. per label instructions.        If not better on follow-up all recommend MRI in consideration for surgical intervention.        Follow up in about 6 weeks (around 9/23/2022).

## 2022-08-17 ENCOUNTER — OFFICE VISIT (OUTPATIENT)
Dept: INTERNAL MEDICINE | Facility: CLINIC | Age: 47
End: 2022-08-17
Payer: OTHER GOVERNMENT

## 2022-08-17 VITALS
OXYGEN SATURATION: 98 % | HEART RATE: 88 BPM | WEIGHT: 246.94 LBS | HEIGHT: 64 IN | DIASTOLIC BLOOD PRESSURE: 62 MMHG | BODY MASS INDEX: 42.16 KG/M2 | RESPIRATION RATE: 18 BRPM | SYSTOLIC BLOOD PRESSURE: 122 MMHG

## 2022-08-17 DIAGNOSIS — Z00.00 ROUTINE GENERAL MEDICAL EXAMINATION AT A HEALTH CARE FACILITY: Primary | ICD-10-CM

## 2022-08-17 DIAGNOSIS — Z12.11 COLON CANCER SCREENING: ICD-10-CM

## 2022-08-17 DIAGNOSIS — Z91.018 FOOD ALLERGY: ICD-10-CM

## 2022-08-17 PROCEDURE — 99999 PR PBB SHADOW E&M-EST. PATIENT-LVL IV: ICD-10-PCS | Mod: PBBFAC,,, | Performed by: INTERNAL MEDICINE

## 2022-08-17 PROCEDURE — 99214 OFFICE O/P EST MOD 30 MIN: CPT | Mod: PBBFAC | Performed by: INTERNAL MEDICINE

## 2022-08-17 PROCEDURE — 99396 PREV VISIT EST AGE 40-64: CPT | Mod: S$PBB,,, | Performed by: INTERNAL MEDICINE

## 2022-08-17 PROCEDURE — 99999 PR PBB SHADOW E&M-EST. PATIENT-LVL IV: CPT | Mod: PBBFAC,,, | Performed by: INTERNAL MEDICINE

## 2022-08-17 PROCEDURE — 99396 PR PREVENTIVE VISIT,EST,40-64: ICD-10-PCS | Mod: S$PBB,,, | Performed by: INTERNAL MEDICINE

## 2022-08-17 NOTE — PROGRESS NOTES
Subjective:       Patient ID: Kellen Fraser is a 47 y.o. female.    Chief Complaint: Annual Exam    Here for annual exam    Wonders if she should see allergy -- crawfish -- lips swell, shrimp -- itching and hives.    Has some reflux of acid reflux.  denies warning signs for GERD -- no dysphagia, no odynophagia, no wt loss, no blood in stool or melena, no fam hx of esophageal cancer.\      R ear stays waxy, hurts at times. No drainage. No sinus congestion or post nasal gtt. She does use qtips at times.    Seeing podiatrist for PF rupture, R foot.    Review of Systems   Constitutional: Negative for activity change and unexpected weight change.   HENT: Negative for hearing loss, rhinorrhea and trouble swallowing.    Eyes: Negative for discharge and visual disturbance.   Respiratory: Negative for chest tightness and wheezing.    Cardiovascular: Negative for chest pain and palpitations.   Gastrointestinal: Negative for blood in stool, constipation, diarrhea and vomiting.   Endocrine: Negative for polydipsia and polyuria.   Genitourinary: Negative for difficulty urinating, dysuria, hematuria and menstrual problem.   Musculoskeletal: Negative for arthralgias, joint swelling and neck pain.   Neurological: Negative for weakness and headaches.   Psychiatric/Behavioral: Negative for confusion and dysphoric mood.           Objective:      Physical Exam  Vitals reviewed.   Constitutional:       General: She is not in acute distress.     Appearance: Normal appearance. She is well-developed. She is obese. She is not ill-appearing, toxic-appearing or diaphoretic.   HENT:      Head: Normocephalic and atraumatic.      Comments: Mild amt of wax seen in R ear canal     Right Ear: Tympanic membrane, ear canal and external ear normal.   Eyes:      General: No scleral icterus.     Pupils: Pupils are equal, round, and reactive to light.   Neck:      Thyroid: No thyromegaly.   Cardiovascular:      Rate and Rhythm: Normal rate and regular  rhythm.      Heart sounds: Normal heart sounds. No murmur heard.    No friction rub. No gallop.   Pulmonary:      Effort: Pulmonary effort is normal. No respiratory distress.      Breath sounds: Normal breath sounds. No wheezing or rales.   Abdominal:      General: Bowel sounds are normal. There is no distension.      Palpations: Abdomen is soft. There is no mass.      Tenderness: There is no abdominal tenderness. There is no guarding or rebound.   Musculoskeletal:         General: No tenderness. Normal range of motion.      Cervical back: Normal range of motion.      Comments: R foot in CAM boot  L ankle tr edema, no calf tenderness to deep palpation     Lymphadenopathy:      Cervical: No cervical adenopathy.   Neurological:      General: No focal deficit present.      Mental Status: She is alert and oriented to person, place, and time.   Psychiatric:         Mood and Affect: Mood normal.         Speech: Speech normal.         Behavior: Behavior normal.         Assessment:       1. Routine general medical examination at a health care facility    2. Colon cancer screening    3. Food allergy        Plan:       Kellen was seen today for annual exam.    Diagnoses and all orders for this visit:    Routine general medical examination at a health care facility    Colon cancer screening  -     Cologuard Screening (Multitarget Stool DNA); Future  -     Cologuard Screening (Multitarget Stool DNA)  Last year was not covered by insurance -- I advised she call insurance and inquire if not covered again    Food allergy  -     Ambulatory referral/consult to Allergy; Future    R ear -- she has small amt of wax. Stop q tip use. Discussed over the counter debrox use with bulb suction    DAXA symptoms -- Pt given handouts. Call if symptoms worsen. She declines medicine.    Lengthy discussion re importance of wt loss, she will try back recent program with which she lost significant wt      Health Maintenance       Date Due Completion  Date    COVID-19 Vaccine (1) Never done ---    Colorectal Cancer Screening Never done ---    Mammogram 12/23/2022 12/23/2021    Lipid Panel 09/02/2025 9/2/2020    Cervical Cancer Screening 11/03/2025 11/3/2020    Override on 12/1/2014: Done    TETANUS VACCINE 02/07/2028 2/7/2018          Follow up in about 1 year (around 8/17/2023).    Future Appointments   Date Time Provider Department Center   8/18/2022  3:00 PM Hugo Kay PT [facility]   9/23/2022  3:30 PM Clemente Young DPM [facility]

## 2022-08-17 NOTE — PATIENT INSTRUCTIONS
Tips to Control Acid Reflux  To control acid reflux, youll need to make some basic diet and lifestyle changes. The simple steps outlined below may be all youll need to relieve discomfort.  Watch What You Eat    Avoid fatty foods and spicy foods.  Eat fewer acidic foods, such as citrus and tomato-based foods. These can increase symptoms.  Limit drinking alcohol, caffeine, and fizzy beverages. All increase acid reflux.  Try limiting chocolate, peppermint, and spearmint. These can worsen acid reflux in some people.  Watch When You Eat  Avoid lying down for 3 hours after eating.  Do not snack before going to bed.  Raise Your Head    Raising your head and upper body by 4 inches to 6 inches helps limit reflux when youre lying down. Put blocks under the head of the bed frame to raise it.  Other Changes  Lose weight, if you need to.  Dont work out near bedtime.  Avoid tight-fitting clothes.  Limit aspirin and ibuprofen.  Stop smoking.   © 2589-7747 "[employer]. [address] [place] [address], [place] [address]. All rights reserved. This information is not intended as a substitute for professional medical care. Always follow your healthcare professional's instructions.        What Is Acid Reflux?    Do you have to clear your throat or cough often? Are you hoarse? Do you have difficulty swallowing? If you have these or other throat symptoms, you may have acid reflux (when stomach acid washes up and irritates your throat).  Why You Have Throat Symptoms  At both ends of the esophagus (the tube that carries food to the stomach) are the esophageal sphincters. These muscles relax to let food pass, then tighten to keep stomach acid down. When the lower esophageal sphincter (LES) doesnt tighten enough, acid can reflux from the stomach into the esophagus. This may cause heartburn. If the upper esophageal sphincter (UES) also doesnt work well, acid can travel higher and enter your throat (pharynx). In many cases, this  causes throat symptoms.  Common Throat Symptoms  Frequent need to clear your throat  Feeling like youre choking  Chronic cough  Hoarseness  Trouble swallowing  Sensation of having a lump in the throat  Sour or acid taste  Recurrent sore throat   © 4566-3581 [employer]. [address] [place] [address], [place] [address]. All rights reserved. This information is not intended as a substitute for professional medical care. Always follow your healthcare professional's instructions.

## 2022-08-18 ENCOUNTER — CLINICAL SUPPORT (OUTPATIENT)
Dept: REHABILITATION | Facility: OTHER | Age: 47
End: 2022-08-18
Attending: PODIATRIST
Payer: OTHER GOVERNMENT

## 2022-08-18 DIAGNOSIS — G89.29 CHRONIC HEEL PAIN, RIGHT: ICD-10-CM

## 2022-08-18 DIAGNOSIS — M25.671 DECREASED RANGE OF MOTION OF RIGHT ANKLE: ICD-10-CM

## 2022-08-18 DIAGNOSIS — R29.898 ANKLE WEAKNESS: ICD-10-CM

## 2022-08-18 DIAGNOSIS — M79.671 CHRONIC HEEL PAIN, RIGHT: ICD-10-CM

## 2022-08-18 DIAGNOSIS — S93.691A RUPTURE OF PLANTAR FASCIA OF RIGHT FOOT, INITIAL ENCOUNTER: ICD-10-CM

## 2022-08-18 DIAGNOSIS — M24.573 EQUINUS CONTRACTURE OF ANKLE: ICD-10-CM

## 2022-08-18 DIAGNOSIS — Z74.09 IMPAIRED FUNCTIONAL MOBILITY, BALANCE, GAIT, AND ENDURANCE: ICD-10-CM

## 2022-08-18 DIAGNOSIS — R29.898 WEAKNESS OF FOOT, RIGHT: ICD-10-CM

## 2022-08-18 PROCEDURE — 97110 THERAPEUTIC EXERCISES: CPT

## 2022-08-18 PROCEDURE — 97140 MANUAL THERAPY 1/> REGIONS: CPT

## 2022-08-18 PROCEDURE — 97161 PT EVAL LOW COMPLEX 20 MIN: CPT

## 2022-08-23 PROBLEM — M25.671 DECREASED RANGE OF MOTION OF RIGHT ANKLE: Status: ACTIVE | Noted: 2022-08-23

## 2022-08-23 PROBLEM — Z74.09 IMPAIRED FUNCTIONAL MOBILITY, BALANCE, GAIT, AND ENDURANCE: Status: ACTIVE | Noted: 2022-08-23

## 2022-08-23 PROBLEM — R29.898 ANKLE WEAKNESS: Status: ACTIVE | Noted: 2022-08-23

## 2022-08-23 PROBLEM — R29.898 WEAKNESS OF FOOT, RIGHT: Status: ACTIVE | Noted: 2022-08-23

## 2022-08-24 NOTE — PLAN OF CARE
[facility]  Physical Therapy Initial Evaluation  Date: 8/18/2022   Name: Kellen Fraser  [facility] Number: [id number]    Therapy Diagnosis:   Encounter Diagnoses   Name Primary?    Rupture of plantar fascia of right foot, initial encounter     Chronic heel pain, right     Equinus contracture of ankle     Ankle weakness     Decreased range of motion of right ankle     Weakness of foot, right     Impaired functional mobility, balance, gait, and endurance      Physician: Clemente Young DPM    Physician Orders: PT Eval and Treat   Medical Diagnosis from Referral:   S93.691A (ICD-10-CM) - Rupture of plantar fascia of right foot, initial encounter   M79.671,G89.29 (ICD-10-CM) - Chronic heel pain, right   M24.573 (ICD-10-CM) - Equinus contracture of ankle     Evaluation Date: 8/18/2022  Authorization Period Expiration: 12/7/2022  Plan of Care Expiration: 11/18/2022  Visit # / Visits authorized: 1/ 1 (pending) Progress Note Due: 9/18/2022  FOTO: 1/ 1    Precautions: Standard, Weightbearing and cancer    Time In: 3:00 pm  Time Out: 4:00 pm  Total Appointment Time (timed & untimed codes): 60 minutes    Subjective   Date of onset: 7/3/2022  History of current condition - Kellen reports: she has been having foot and heel pain since evacuating [place] and was in a boot for 4 months. She received a steroid shot in December that helped significant for about 4 months, and she was able to take the boot off around march and ween off it for a few months. Then on 4th of July weekend she was playing [employer] and she stepped forward to throw a bag and as she stepped she felt a pop and fell over. There wasn't pain initially but when she stood up she could not put weight through it because it hurt so bad. Aggravating factors include weightbearing, standing, walking, stairs, driving, and dorsiflexion. Alleviated with walking boot, injection, rest, and elevation.     RYAN: stepped forward  Any falls: No  Any pain  Plantar fascia pain: Yes  Any pain Deltoid ligament: no  Any pain ATFL, calcaneou fibular or posterior talofibular L: no  Any ankle bruise: initially after the injury, none now  Pain radiates: no  Pain constant or intermittent: intermittent  Any injection: previously with relief      Current 0/10, worst 8/10, best 0/10   Location: right heel, bottom of the foot  Description: Sharp  Aggravating Factors: weightbearing, standing, walking, stairs, driving, and dorsiflexion  Easing Factors: walking boot, injection, rest, and elevation    Prior Therapy: given HEP, but never formal PT  Social History: lives in [facility] 2 steps to enter. lives with their spouse  Occupation: Admin work for [employer]  Prior Level of Function: independent  Current Level of Function: increased pain and decreased tolerance to weightbearing, dorsiflexion, walking, standing, driving, sitting, sit to stand, lifting, and household chores    Pts goals: be able to walk without the boot without pain      Imaging: XR CALCANEUS 2 VIEW RIGHT     CLINICAL HISTORY:  Other sprain of right foot, initial encounter     FINDINGS:  Calcaneus two views right:     There is DJD.  There are spurs on the calcaneus.  No trauma seen.     Impression:     No acute process seen.    Medical History:   Past Medical History:   Diagnosis Date    BRCA negative 11/2014    Cervical disc disease     C5-C6, impacting thecal sac    Gallbladder sludge     Hypothyroidism     Thyroid cancer     Uterine fibroid        Surgical History:   Kellen Fraser  has a past surgical history that includes Thyroidectomy (2004); Cholecystectomy (2012); Foot surgery (Right); Breast biopsy (Right); and Epidural steroid injection (N/A, 9/10/2019).    Medications:   Kellen has a current medication list which includes the following prescription(s): albuterol, butalbital-acetaminophen-caffeine -40 mg, levothyroxine, lidocaine hcl 2%, lidocaine hcl 2%, meloxicam, and methocarbamol, and the following  Facility-Administered Medications: sodium chloride 0.9% and aluminum & magnesium hydroxide-simethicone.    Allergies:   Review of patient's allergies indicates:   Allergen Reactions    Morphine Shortness Of Breath    Shellfish containing products Hives and Itching    Dilaudid [hydromorphone (bulk)] Hives    Erythromycin Hives    Influenza virus vaccines Hives    Meperidine Hives     Other reaction(s): Rash, Urticaria, Rash, Urticaria    Penicillins Hives     Other reaction(s): Other: hives    Reglan [metoclopramide] Nausea And Vomiting    Toradol [ketorolac] Nausea And Vomiting    Oyster shell Diarrhea and Nausea And Vomiting    Sulfamethoxazole-trimethoprim Nausea And Vomiting     Other reaction(s): Unknown          Objective     Observation: pt presents ambulating with crutches and non weight bearing in R ankle while wearing walking boot    Sensation: Light touch:intact to light touch    GAIT DEVIATIONS: Kellen displays non weight bearing through     Ankle Range of Motion:   Ankle Right Left   Dorsiflexion (7) 5   Plantarflexion 40 50   Inversion 31 37   Eversion 11 18      Strength:   Right Ankle   Left Ankle    Dorsiflexion: 4-/5 Dorsiflexion: 5/5   Plantarflexion:  3+/5 Plantarflexion: 4+/5   Inversion: 4-/5 Inversion: 4+/5   Eversion: 4-/5 Eversion: 4+/5       Right LE  Left LE    Hip flexion: 4-/5 Hip flexion: 4/5   Hip extension:  3+/5 Hip extension: 3+/5   Hip abduction: 3+/5 Hip abduction: 3+/5   Hip adduction: 3+/5 Hip adduction 3+/5   Knee extension: 4/5 Knee extension: 4+/5   Knee flexion: 4/5 Knee flexion: 4/5     Special Tests:   Right Left   Posterior Drawer Test - -   Anterior Drawer Test - -   Squeeze test - -   Fink test - -   Subtalar Neutral - -   Heel walking + -   Toe walking + -   Navicular Drop - -   Windlass test + -       Joint Mobility: hypomobility of R talocrural joint, normal on L    Palpation: tender to palpation of plantar fascia, calcaenues, 5th metatarsal, and most  "tender at medial calcaneal tubercle    Flexibility: decreased soft tissue flexibility and mobility of R plantar fascia and throughout R LE      CMS Impairment/Limitation/Restriction for FOTO foot Survey    Therapist reviewed FOTO scores for Kellen Fraser on 8/18/2022.   FOTO documents entered into [employer] - see Media section.             TREATMENT   Treatment Time In: 3:30  Treatment Time Out: 4:00  Total Treatment time separate from Evaluation: 30 minutes    Kellen received therapeutic exercises to develop strength, ROM and flexibility for 15 minutes including:    Towel gastroc/PF stretch 3x30"  Plantar fascia stretch biased with big toe 3x30"  Plantarflexion GTB x20  Inversion GTB 2x10  Eversion GTB 2x10  Towel scrunches x3 min    Kellen received the following manual therapy techniques: Soft tissue Mobilization and dry needling were applied to the: R plantar fascia for 10 minutes, including:    STM R plantar fasica    FDN: pt received dry needling to R plantar fascia as it inserts into the medial calcaneal tubercle. (1) 40 mm needle was inserted and pistoned and manipulated while performing periosteal pecking and then removed. No adverse effects noted.     Kellen received Hot pack for 5 minutes to R plantar fascia.    Home Exercises and Patient Education Provided    Education provided:   - HEP, POC    Written Home Exercises Provided: yes.  Exercises were reviewed and Kellen was able to demonstrate them prior to the end of the session.  Kellen demonstrated good  understanding of the education provided.     See EMR under Patient Instructions for exercises provided 8/18/2022.    Assessment   Kellen is a 47 y.o. female referred to outpatient Physical Therapy with a medical diagnosis of S93.691A (ICD-10-CM) - Rupture of plantar fascia of right foot, M79.671,G89.29 (ICD-10-CM) - Chronic heel pain, right, and  M24.573 (ICD-10-CM) - Equinus contracture of ankle. Pt presents with signs and symptoms consistent with diagnosis " including decreased range of motion of R ankle, R lower extremity weakness, most notably in foot and ankle, decreased joint mobility, decreased soft tissue flexibility and mobility, inability to bear weight through R foot, poor balance, gait deviations, and decreased functional mobility tolerance. These deficits are limiting patient in full participation in ADL's and leisure activities such as weightbearing, dorsiflexion, walking, standing, driving, sitting, sit to stand, lifting, and household chores. Pt will benefit from skilled outpatient Physical Therapy to address the deficits stated above and in the chart below, provide pt/family education, and to maximize pt's level of independence.     Pt prognosis is Good.       Plan of care discussed with patient: Yes  Pt's spiritual, cultural and educational needs considered and patient is agreeable to the plan of care and goals as stated below:     Anticipated Barriers for therapy: History of similar symptoms, chronicity of condition    Medical Necessity is demonstrated by the following  History  Co-morbidities and personal factors that may impact the plan of care Co-morbidities:   difficulty sleeping, high BMI, history of cancer and prior R foot surgery, hypothyroidism     Personal Factors:   no deficits     Complexity: low   Examination  Body Structures and Functions, activity limitations and participation restrictions that may impact the plan of care Body Regions:   back  lower extremities  trunk    Body Systems:    gross symmetry  ROM  strength  gross coordinated movement  balance  gait  transfers  transitions  motor control    Participation Restrictions:   See above    Activity limitations:   Learning and applying knowledge  no deficits    General Tasks and Commands  no deficits    Communication  no deficits    Mobility  lifting and carrying objects  walking  driving (bike, car, motorcycle)    Self care  no deficits    Domestic Life  shopping  doing house work  (cleaning house, washing dishes, laundry)  assisting others    Interactions/Relationships  no deficits    Life Areas  no deficits    Community and Social Life  no deficits         Complexity: low  See FOTO outcome assessment   Clinical Presentation stable and uncomplicated low   Decision Making/ Complexity Score: low     Goals:  GOALS: Short Term Goals:  4 weeks  1.Report decreased  in  pain at worse less than  <   / =  5  /10  to increase tolerance for improved functional actvities. On going  2. Pt to improve range of motion by 25% to allow for improved functional mobility to allow for improvement in IADLs. On going  3. Increased R ankle MMT 1/2 grade  to increase tolerance for ADL and work activities.On going  4. Pt to demonstrate full weightbearing in R LE without pain. Ongoing  5. Pt to tolerate HEP to improve ROM and independence with ADL's. On going    Long Term Goals: 8 weeks  1.Report decreased  in  pain at worse  less than  <   / =  3  /10  to increase tolerance for improved functional actvities. On going  2.Pt to improve range of motion by 75% to allow for improved functional mobility to allow for improvement in IADLs. On going  3.Increased R ankle MMT 1 grade  to increase tolerance for ADL and work activities.On going  4. Pt will score at least 43% or less on  FOTO outcome assessment  to increase functional activities and mobility. On going  5. Pt to be Independent with HEP to improve ROM and independence with ADL's. On going  6. Pt will be able to ambulate 500 ft without increased foot pain      Plan   Plan of care Certification: 8/18/2022 to 10/18/2022.    Outpatient Physical Therapy 2 times weekly for 8 weeks to include the following interventions: Gait Training, Manual Therapy, Moist Heat/ Ice, Neuromuscular Re-ed, Orthotic Management and Training, Patient Education, Therapeutic Activities and Therapeutic Exercise. Dry needling.     Hugo Kay, PT      I CERTIFY THE NEED FOR THESE SERVICES  FURNISHED UNDER THIS PLAN OF TREATMENT AND WHILE UNDER MY CARE   Physician's comments:     Physician's Signature: ___________________________________________________

## 2022-09-07 ENCOUNTER — CLINICAL SUPPORT (OUTPATIENT)
Dept: REHABILITATION | Facility: OTHER | Age: 47
End: 2022-09-07
Attending: PHYSICAL MEDICINE & REHABILITATION
Payer: OTHER GOVERNMENT

## 2022-09-07 DIAGNOSIS — R29.898 ANKLE WEAKNESS: Primary | ICD-10-CM

## 2022-09-07 DIAGNOSIS — R29.898 WEAKNESS OF FOOT, RIGHT: ICD-10-CM

## 2022-09-07 DIAGNOSIS — Z74.09 IMPAIRED FUNCTIONAL MOBILITY, BALANCE, GAIT, AND ENDURANCE: ICD-10-CM

## 2022-09-07 DIAGNOSIS — M25.671 DECREASED RANGE OF MOTION OF RIGHT ANKLE: ICD-10-CM

## 2022-09-07 PROCEDURE — 97110 THERAPEUTIC EXERCISES: CPT | Mod: CQ

## 2022-09-07 PROCEDURE — 97140 MANUAL THERAPY 1/> REGIONS: CPT | Mod: CQ

## 2022-09-07 NOTE — PROGRESS NOTES
Physical Therapy Daily Treatment Note     Name: Kellen Fraser  [facility] Number: [id number]    Therapy Diagnosis:   Encounter Diagnoses   Name Primary?    Ankle weakness Yes    Decreased range of motion of right ankle     Weakness of foot, right     Impaired functional mobility, balance, gait, and endurance      Physician: Clemente Young DPM    Visit Date: 9/7/2022    Physician Orders: PT Eval and Treat   Medical Diagnosis from Referral:   S93.691A (ICD-10-CM) - Rupture of plantar fascia of right foot, initial encounter   M79.671,G89.29 (ICD-10-CM) - Chronic heel pain, right   M24.573 (ICD-10-CM) - Equinus contracture of ankle      Evaluation Date: 8/18/2022  Authorization Period Expiration: 12/7/2022  Plan of Care Expiration: 11/18/2022  Visit # / Visits authorized: 1/ 15 (2 total)   Progress Note Due: 9/18/2022  FOTO: 1/ 1      Time In: 2:55 pm  Time Out: 3:55  Total Billable Time: 45 minutes    Precautions: Standard, Weightbearing and cancer    Subjective     Pt reports: no R heel upon arrival for first follow up. She spent more time on her feet over the weekend in her walking boot and ambulating with crutches. She experienced increased discomfort and swelling following extended weight bearing.    She was compliant with home exercise program.  Response to previous treatment: decrease pain following dry needling last visit  Functional change: None Yet    Pain: 0/10  Location:  right heel, bottom of the foot     Objective   Ankle Range of Motion: 8/18/22  Ankle Right Left   Dorsiflexion (7) 5   Plantarflexion 40 50   Inversion 31 37   Eversion 11 18      Strength: 8/18/22  Right Ankle    Left Ankle     Dorsiflexion: 4-/5 Dorsiflexion: 5/5   Plantarflexion:  3+/5 Plantarflexion: 4+/5   Inversion: 4-/5 Inversion: 4+/5   Eversion: 4-/5 Eversion: 4+/5         Right LE   Left LE     Hip flexion: 4-/5 Hip flexion: 4/5   Hip extension:  3+/5 Hip extension: 3+/5   Hip abduction: 3+/5 Hip abduction: 3+/5   Hip adduction: 3+/5  "Hip adduction 3+/5   Knee extension: 4/5 Knee extension: 4+/5   Knee flexion: 4/5 Knee flexion:               4/5     TREATMENT     Kellen received therapeutic exercises to develop strength, endurance, ROM, flexibility, posture and core stabilization for 35 minutes including:    Recumbent bike level 1 for 5 minutes    Towel gastroc/PF stretch 3x30"  Plantar fascia stretch biased with big toe 3x30"  Plantarflexion GTB x30  Inversion GTB 2x10  Eversion GTB 2x10  +Dorsiflexion GTB 2x10  Towel scrunches x3 min  +Toe Yoga x3 min  +Plantar/dorsiflexion on fitterboard x30  +CW/CCW on fitterboard x20 ea  +seated heel raises x20      Kellen received the following manual therapy techniques: Soft tissue Mobilization were applied to the: R Plantar fascia for 10 minutes         Kellen received cold pack for 10 minutes to R foot.      Home Exercises Provided and Patient Education Provided     Education provided:   - HEP review  -Cueing with exercises    Written Home Exercises Provided: Patient instructed to cont prior HEP.  Exercises were reviewed and Kellen was able to demonstrate them prior to the end of the session.  Kellen demonstrated good  understanding of the education provided.     See EMR under Patient Instructions for exercises provided prior visit.    Assessment   Kellen returned for her first follow up visit without complaints of R heel pain.  She experienced increased discomfort and swelling over last weekend that she attributes to increased weight bearing and ambulating with walking boot and axillary crutches.  Treatment began with HEP review with Kellen demonstrating good recall of exercises needing only minimal verbal cues to ensure proper form.  Toe yoga, seated heel raises, and fitter board ankle mobility exercises added today with Kellen tolerating progressions well with no increase in pain; she demonstrated weakness with plantar flexion on seated heel raises.  Will monitor and attempt to progress per pt's " tolerance.    Kellen Is progressing well towards her goals.   Pt prognosis is Good.     Pt will continue to benefit from skilled outpatient physical therapy to address the deficits listed in the problem list box on initial evaluation, provide pt/family education and to maximize pt's level of independence in the home and community environment.     Pt's spiritual, cultural and educational needs considered and pt agreeable to plan of care and goals.     Anticipated barriers to physical therapy: History of similar symptoms, chronicity of condition    Goals:   GOALS: Short Term Goals:  4 weeks  1.Report decreased  in  pain at worse less than  <   / =  5  /10  to increase tolerance for improved functional actvities. On going  2. Pt to improve range of motion by 25% to allow for improved functional mobility to allow for improvement in IADLs. On going  3. Increased R ankle MMT 1/2 grade  to increase tolerance for ADL and work activities.On going  4. Pt to demonstrate full weightbearing in R LE without pain. Ongoing  5. Pt to tolerate HEP to improve ROM and independence with ADL's. On going     Long Term Goals: 8 weeks  1.Report decreased  in  pain at worse  less than  <   / =  3  /10  to increase tolerance for improved functional actvities. On going  2.Pt to improve range of motion by 75% to allow for improved functional mobility to allow for improvement in IADLs. On going  3.Increased R ankle MMT 1 grade  to increase tolerance for ADL and work activities.On going  4. Pt will score at least 43% or less on  FOTO outcome assessment  to increase functional activities and mobility. On going  5. Pt to be Independent with HEP to improve ROM and independence with ADL's. On going  6. Pt will be able to ambulate 500 ft without increased foot pain       Plan     Plan of care Certification: 8/18/2022 to 10/18/2022.     Outpatient Physical Therapy 2 times weekly for 8 weeks to include the following interventions: Gait Training,  Manual Therapy, Moist Heat/ Ice, Neuromuscular Re-ed, Orthotic Management and Training, Patient Education, Therapeutic Activities and Therapeutic Exercise. Dry needling.        Nishant Stovall, PTA

## 2022-09-20 ENCOUNTER — CLINICAL SUPPORT (OUTPATIENT)
Dept: REHABILITATION | Facility: OTHER | Age: 47
End: 2022-09-20
Attending: PODIATRIST
Payer: OTHER GOVERNMENT

## 2022-09-20 DIAGNOSIS — R29.898 WEAKNESS OF FOOT, RIGHT: ICD-10-CM

## 2022-09-20 DIAGNOSIS — M25.671 DECREASED RANGE OF MOTION OF RIGHT ANKLE: ICD-10-CM

## 2022-09-20 DIAGNOSIS — Z74.09 IMPAIRED FUNCTIONAL MOBILITY, BALANCE, GAIT, AND ENDURANCE: ICD-10-CM

## 2022-09-20 DIAGNOSIS — R29.898 ANKLE WEAKNESS: Primary | ICD-10-CM

## 2022-09-20 PROCEDURE — 97110 THERAPEUTIC EXERCISES: CPT

## 2022-09-20 PROCEDURE — 97140 MANUAL THERAPY 1/> REGIONS: CPT

## 2022-09-20 NOTE — PROGRESS NOTES
Physical Therapy Daily Treatment Note     Name: Kellen Fraser  [facility] Number: [id number]    Therapy Diagnosis:   Encounter Diagnoses   Name Primary?    Ankle weakness Yes    Decreased range of motion of right ankle     Weakness of foot, right     Impaired functional mobility, balance, gait, and endurance        Physician: Clemente Young DPM    Visit Date: 9/20/2022    Physician Orders: PT Eval and Treat   Medical Diagnosis from Referral:   S93.691A (ICD-10-CM) - Rupture of plantar fascia of right foot, initial encounter   M79.671,G89.29 (ICD-10-CM) - Chronic heel pain, right   M24.573 (ICD-10-CM) - Equinus contracture of ankle      Evaluation Date: 8/18/2022  Authorization Period Expiration: 12/7/2022  Plan of Care Expiration: 10/18/2022  Visit # / Visits authorized: 3/ 15   Progress Note Due: 10/20/2022  FOTO: 1/ 1      Time In: 3:00 pm  Time Out: 4:05 pm  Total Billable Time: 60 minutes    Precautions: Standard, Weightbearing and cancer    Subjective     Pt reports: she has continued to wear the boot, she felt like she was doing better after last visit, but she had to help with some kathya and her foot is hurting more from that as a result    She was compliant with home exercise program.  Response to previous treatment: reduced pain  Functional change: None Yet    Pain: 4/10  Location:  right heel, bottom of the foot     Objective   Ankle Range of Motion: 9/20/2022  Ankle Right Left   Dorsiflexion 2 5   Plantarflexion 43 50   Inversion 34 37   Eversion 13 18      Strength: 9/20/2022  Right Ankle    Left Ankle     Dorsiflexion: 4/5 Dorsiflexion: 5/5   Plantarflexion:  3+/5 Plantarflexion: 4+/5   Inversion: 4/5 Inversion: 4+/5   Eversion: 4/5 Eversion: 4+/5         Right LE   Left LE     Hip flexion: 4-/5 Hip flexion: 4/5   Hip extension:  3+/5 Hip extension: 3+/5   Hip abduction: 3+/5 Hip abduction: 3+/5   Hip adduction: 3+/5 Hip adduction 3+/5   Knee extension: 4/5 Knee extension: 4+/5   Knee flexion: 4/5 Knee  "flexion:               4/5     TREATMENT     Kellen received therapeutic exercises to develop strength, endurance, ROM, flexibility, posture and core stabilization for 45 minutes including:    Recumbent bike level 1 for 5 minutes    Towel gastroc/PF stretch 3x30"  Plantar fascia stretch biased with big toe 3x30"  Plantarflexion BTB 3x10 cues for toe curl to target plantar fascia  Inversion GTB 2x10  Eversion GTB 2x10  Dorsiflexion GTB 2x10  Towel scrunches x3 min  Toe Yoga x3 min  Plantar/dorsiflexion on fitterboard x20  CW/CCW on fitterboard x20 ea  seated heel raises 10# KB 2x10      Kellen received the following manual therapy techniques: dry needling and Soft tissue Mobilization and dry needling were applied to the: R Plantar fascia for 15 minutes     STM R plantar fascia x5'    40mm needle were inserted into the plantar fascia insertion at the medial calcaneal tubercle and was manipulated for 15-20 seconds and then was removed. Another 40mm needle was insterted into the medial head of the gastroc at the musculotendinous junction. This needle was left in and manipulated every 2-3 min for a total of 10 min       Kellen received hot pack for 5 minutes to R foot.      Home Exercises Provided and Patient Education Provided     Education provided:   - HEP review  -Cueing with exercises    Written Home Exercises Provided: Patient instructed to cont prior HEP.  Exercises were reviewed and Kellen was able to demonstrate them prior to the end of the session.  Kellen demonstrated good  understanding of the education provided.     See EMR under Patient Instructions for exercises provided prior visit.    Assessment   Kellen presents to her second followup since initial evaluation with reports of increased pain levels after increased activity outside of clinic. She had reported decreased pain after last session and felt she was improving, but pain is limiting her today. Reassessment was performed with pt demo gains in all " planes of R ankle ROM and improved strength, but is still limited in gait, balance, and functional mobility activities due to poor tolerance to weight bearing. She is still unable to tolerate weight through the R heel and walks on her toes when she is barefoot out of shoe or boot. Good tolerance to all strengthening today, but unable to progress standing and weightbearing exercises due to pain. Continue to progress as tolerated.     Kellen Is progressing well towards her goals.   Pt prognosis is Good.     Pt will continue to benefit from skilled outpatient physical therapy to address the deficits listed in the problem list box on initial evaluation, provide pt/family education and to maximize pt's level of independence in the home and community environment.     Pt's spiritual, cultural and educational needs considered and pt agreeable to plan of care and goals.     Anticipated barriers to physical therapy: History of similar symptoms, chronicity of condition    Goals:   GOALS: Short Term Goals:  4 weeks  1.Report decreased  in  pain at worse less than  <   / =  5  /10  to increase tolerance for improved functional actvities. (Not met, progressing)  2. Pt to improve range of motion by 25% to allow for improved functional mobility to allow for improvement in IADLs. MET  3. Increased R ankle MMT 1/2 grade  to increase tolerance for ADL and work activities. (Not met, progressing)  4. Pt to demonstrate full weightbearing in R LE without pain. (Not met, progressing)  5. Pt to tolerate HEP to improve ROM and independence with ADL's. MET     Long Term Goals: 8 weeks  1.Report decreased  in  pain at worse  less than  <   / =  3  /10  to increase tolerance for improved functional actvities. (Not met, progressing)  2.Pt to improve range of motion by 75% to allow for improved functional mobility to allow for improvement in IADLs. (Not met, progressing)  3.Increased R ankle MMT 1 grade  to increase tolerance for ADL and work  activities. (Not met, progressing)  4. Pt will score at least 43% or less on  FOTO outcome assessment  to increase functional activities and mobility. (Not met, progressing)  5. Pt to be Independent with HEP to improve ROM and independence with ADL's. (Not met, progressing)  6. Pt will be able to ambulate 500 ft without increased foot pain. (Not met, progressing)       Plan     Plan of care Certification: 8/18/2022 to 10/18/2022.     Outpatient Physical Therapy 2 times weekly for 8 weeks to include the following interventions: Gait Training, Manual Therapy, Moist Heat/ Ice, Neuromuscular Re-ed, Orthotic Management and Training, Patient Education, Therapeutic Activities and Therapeutic Exercise. Dry needling.        Hugo Kay, PT

## 2022-09-23 ENCOUNTER — OFFICE VISIT (OUTPATIENT)
Dept: PODIATRY | Facility: CLINIC | Age: 47
End: 2022-09-23
Payer: OTHER GOVERNMENT

## 2022-09-23 VITALS
SYSTOLIC BLOOD PRESSURE: 135 MMHG | DIASTOLIC BLOOD PRESSURE: 64 MMHG | WEIGHT: 246.94 LBS | BODY MASS INDEX: 42.16 KG/M2 | HEIGHT: 64 IN | HEART RATE: 82 BPM

## 2022-09-23 DIAGNOSIS — M79.671 FOOT PAIN, RIGHT: ICD-10-CM

## 2022-09-23 DIAGNOSIS — M72.2 PLANTAR FASCIITIS: ICD-10-CM

## 2022-09-23 DIAGNOSIS — M24.573 EQUINUS CONTRACTURE OF ANKLE: ICD-10-CM

## 2022-09-23 DIAGNOSIS — G89.29 CHRONIC HEEL PAIN, RIGHT: ICD-10-CM

## 2022-09-23 DIAGNOSIS — M79.671 CHRONIC HEEL PAIN, RIGHT: ICD-10-CM

## 2022-09-23 DIAGNOSIS — S93.691A RUPTURE OF PLANTAR FASCIA OF RIGHT FOOT, INITIAL ENCOUNTER: Primary | ICD-10-CM

## 2022-09-23 PROCEDURE — 99213 OFFICE O/P EST LOW 20 MIN: CPT | Mod: PBBFAC,PN | Performed by: PODIATRIST

## 2022-09-23 PROCEDURE — 99213 OFFICE O/P EST LOW 20 MIN: CPT | Mod: S$PBB,,, | Performed by: PODIATRIST

## 2022-09-23 PROCEDURE — 99999 PR PBB SHADOW E&M-EST. PATIENT-LVL III: CPT | Mod: PBBFAC,,, | Performed by: PODIATRIST

## 2022-09-23 PROCEDURE — 99999 PR PBB SHADOW E&M-EST. PATIENT-LVL III: ICD-10-PCS | Mod: PBBFAC,,, | Performed by: PODIATRIST

## 2022-09-23 PROCEDURE — 99213 PR OFFICE/OUTPT VISIT, EST, LEVL III, 20-29 MIN: ICD-10-PCS | Mod: S$PBB,,, | Performed by: PODIATRIST

## 2022-09-23 NOTE — PROGRESS NOTES
Subjective:      Patient ID: Kellen Fraser is a 47 y.o. female.    Chief Complaint: Follow-up (Heel pain)    Sharp deep pain bottom of the right heel and arch.  Rapid onset stepping down about a 10 weeks a go.  She heard a pop and had intense pain in the bottom of the heel and arch.  Aggravated by increased weight-bearing prolonged standing some shoes.  Fracture boot ambulation, knee since pre pop.  Self-treatment with fracture boot knee walker help some.  X-rays of the heel last visit show no acute osseous abnormality right heel.  PT helps some, but still cannot bear any weight.    Review of Systems   Constitutional: Negative for chills, diaphoresis, fever, malaise/fatigue and night sweats.   Cardiovascular:  Negative for claudication, cyanosis, leg swelling and syncope.   Skin:  Negative for color change, dry skin, nail changes, rash, suspicious lesions and unusual hair distribution.   Musculoskeletal:  Negative for falls, joint pain, joint swelling, muscle cramps, muscle weakness and stiffness.   Gastrointestinal:  Negative for constipation, diarrhea, nausea and vomiting.   Neurological:  Negative for brief paralysis, disturbances in coordination, focal weakness, numbness, paresthesias, sensory change and tremors.         Objective:      Physical Exam  Constitutional:       General: She is not in acute distress.     Appearance: She is well-developed. She is not diaphoretic.   Cardiovascular:      Pulses:           Popliteal pulses are 2+ on the right side and 2+ on the left side.        Dorsalis pedis pulses are 2+ on the right side and 2+ on the left side.        Posterior tibial pulses are 2+ on the right side and 2+ on the left side.      Comments: Capillary refill 3 seconds all toes/distal feet, all toes/both feet warm to touch.      Negative lymphadenopathy bilateral popliteal fossa and tarsal tunnel.      Negavie lower extremity edema bilateral.    Musculoskeletal:      Right ankle: No swelling, deformity,  ecchymosis or lacerations. Normal range of motion. Normal pulse.      Right Achilles Tendon: Normal. No defects. Fink's test negative.      Comments: Sharp deep pain to palpation inferior heel right at medial calcaneal tubercle without ecchymosis, erythema, edema, or cardinal signs infection, and no signs of trauma.    Ankle dorsiflexion decreased at <10 degrees bilateral with moderate increase with knee flexion bilateral.    Otherwise, Normal angle, base, station of gait. All ten toes without clubbing, cyanosis, or signs of ischemia.  No pain to palpation bilateral lower extremities.  Range of motion, stability, muscle strength, and muscle tone normal bilateral feet and legs.    Lymphadenopathy:      Lower Body: No right inguinal adenopathy. No left inguinal adenopathy.      Comments: Negative lymphadenopathy bilateral popliteal fossa and tarsal tunnel.    Negative lymphangitic streaking bilateral feet/ankles/legs.   Skin:     General: Skin is warm and dry.      Capillary Refill: Capillary refill takes 2 to 3 seconds.      Coloration: Skin is not pale.      Findings: No abrasion, bruising, burn, ecchymosis, erythema, laceration, lesion or rash.      Nails: There is no clubbing.      Comments: Skin is normal age and health appropriate color, turgor, texture, and temperature bilateral lower extremities without ulceration, hyperpigmentation, discoloration, masses nodules or cords palpated.  No ecchymosis, erythema, edema, or cardinal signs of infection bilateral lower extremities.       Neurological:      Mental Status: She is alert and oriented to person, place, and time.      Sensory: No sensory deficit.      Motor: No tremor, atrophy or abnormal muscle tone.      Gait: Gait normal.      Deep Tendon Reflexes:      Reflex Scores:       Patellar reflexes are 2+ on the right side and 2+ on the left side.       Achilles reflexes are 2+ on the right side and 2+ on the left side.     Comments: Negative tinel sign to  percussion sural, superficial peroneal, deep peroneal, saphenous, and posterior tibial nerves right and left ankles and feet.     Psychiatric:         Behavior: Behavior is cooperative.             Assessment:       Encounter Diagnoses   Name Primary?    Rupture of plantar fascia of right foot, initial encounter Yes    Chronic heel pain, right     Equinus contracture of ankle     Plantar fasciitis     Foot pain, right          Plan:       Kellen was seen today for follow-up.    Diagnoses and all orders for this visit:    Rupture of plantar fascia of right foot, initial encounter  -     MRI Foot ([facility]) Right Without Contrast; Future    Chronic heel pain, right  -     MRI Foot ([facility]) Right Without Contrast; Future    Equinus contracture of ankle  -     MRI Foot ([facility]) Right Without Contrast; Future    Plantar fasciitis  -     MRI Foot ([facility]) Right Without Contrast; Future    Foot pain, right  -     MRI Foot ([facility]) Right Without Contrast; Future    I counseled the patient on her conditions, their implications and medical management.    Continue stretches, inserts, ambulation in the fracture boot to tolerance, PT, knee walker as needed.       Continue over-the-counter NSAIDs p.r.n. per label instructions.        Plan more definitive management based on MRI information.    No follow-ups on file.

## 2022-09-26 ENCOUNTER — CLINICAL SUPPORT (OUTPATIENT)
Dept: REHABILITATION | Facility: OTHER | Age: 47
End: 2022-09-26
Attending: PODIATRIST
Payer: OTHER GOVERNMENT

## 2022-09-26 DIAGNOSIS — Z74.09 IMPAIRED FUNCTIONAL MOBILITY, BALANCE, GAIT, AND ENDURANCE: ICD-10-CM

## 2022-09-26 DIAGNOSIS — M25.671 DECREASED RANGE OF MOTION OF RIGHT ANKLE: ICD-10-CM

## 2022-09-26 DIAGNOSIS — R29.898 ANKLE WEAKNESS: Primary | ICD-10-CM

## 2022-09-26 DIAGNOSIS — R29.898 WEAKNESS OF FOOT, RIGHT: ICD-10-CM

## 2022-09-26 PROCEDURE — 97140 MANUAL THERAPY 1/> REGIONS: CPT | Mod: CQ

## 2022-09-26 PROCEDURE — 97110 THERAPEUTIC EXERCISES: CPT | Mod: CQ

## 2022-09-26 NOTE — PROGRESS NOTES
"Physical Therapy Daily Treatment Note     Name: Kellen Fraser  [facility] Number: [id number]    Therapy Diagnosis:   Encounter Diagnoses   Name Primary?    Ankle weakness Yes    Decreased range of motion of right ankle     Weakness of foot, right     Impaired functional mobility, balance, gait, and endurance        Physician: Clemente Young, YOLANDA    Visit Date: 9/26/2022    Physician Orders: PT Eval and Treat   Medical Diagnosis from Referral:   S93.691A (ICD-10-CM) - Rupture of plantar fascia of right foot, initial encounter   M79.671,G89.29 (ICD-10-CM) - Chronic heel pain, right   M24.573 (ICD-10-CM) - Equinus contracture of ankle      Evaluation Date: 8/18/2022  Authorization Period Expiration: 12/7/2022  Plan of Care Expiration: 10/18/2022  Visit # / Visits authorized: 4/ 15   Progress Note Due: 10/20/2022  FOTO: 1/ 1      Time In: 3:00 pm  Time Out: 4:00 pm  Total Billable Time: 55 minutes    Precautions: Standard, Weightbearing and cancer    Subjective     Pt reports:continued c/o (R) plantar fascia pain at heel and is unable to bear weight through her foot even with her Cam Walking boot. States that she utilizes her "knee walker" all the time to get around. She reports no real change after dry needling treatment last session and notes some gastroc soreness from this as well.  She report meeting with Dr. Young last week who has scheduled a MRI for Oct 7th.   She was compliant with home exercise program.  Response to previous treatment: reduced pain  Functional change: None yet    Pain: 0/10 pain at rest and 7-8/10 with any type of WB'ing.   Location:  right heel, bottom of the foot     Objective   Ankle Range of Motion: 9/20/2022  Ankle Right Left   Dorsiflexion 2 5   Plantarflexion 43 50   Inversion 34 37   Eversion 13 18      Strength: 9/20/2022  Right Ankle    Left Ankle     Dorsiflexion: 4/5 Dorsiflexion: 5/5   Plantarflexion:  3+/5 Plantarflexion: 4+/5   Inversion: 4/5 Inversion: 4+/5   Eversion: 4/5 Eversion: " "4+/5         Right LE   Left LE     Hip flexion: 4-/5 Hip flexion: 4/5   Hip extension:  3+/5 Hip extension: 3+/5   Hip abduction: 3+/5 Hip abduction: 3+/5   Hip adduction: 3+/5 Hip adduction 3+/5   Knee extension: 4/5 Knee extension: 4+/5   Knee flexion: 4/5 Knee flexion:               4/5     TREATMENT     Kellen received therapeutic exercises to develop strength, endurance, ROM, flexibility, posture and core stabilization for 45 minutes including:    Recumbent bike level 3 for 5 minutes    Towel gastroc/PF stretch 3x30"  Plantar fascia stretch biased with big toe 3x30"  Plantarflexion BTB 3x10 cues for toe curl to target plantar fascia  Inversion GTB 2x10  Eversion GTB 2x10  Dorsiflexion GTB 2x10  Towel scrunches x3 min  Toe Yoga x3 min  Plantar/dorsiflexion on fitterboard x20  CW/CCW on fitterboard x20 ea  seated heel raises 10# KB 3x10  +Standing weight shifts on Airex x 2 minutes x 2 trials   +self roller massage (Ribbed roller) to plantar fascia area x 2 minures    Kellen received the following manual therapy techniques: x 10 minutes to include: Soft tissue Mobilization to the: R Plantar fascia, distal gastroc . Application of Kinesiotape with 1-"I" strip along plantar fascia with ~ 50% tension and 2nd "I" strip for arch support with ~ 50% tension.  Patient educated on wear time and removal instructions.     40mm needle were inserted into the plantar fascia insertion at the medial calcaneal tubercle and was manipulated for 15-20 seconds and then was removed. Another 40mm needle was insterted into the medial head of the gastroc at the musculotendinous junction. This needle was left in and manipulated every 2-3 min for a total of 10 min --NP      Kellen received hot pack for 5 minutes to R foot.--NP      Home Exercises Provided and Patient Education Provided     Education provided:   - HEP review  -Cueing with exercises    Written Home Exercises Provided: Patient instructed to cont prior HEP.  Exercises were " reviewed and Kellen was able to demonstrate them prior to the end of the session.  Kellen demonstrated good  understanding of the education provided.     See EMR under Patient Instructions for exercises provided prior visit.    Assessment   Kellen returns with persistent (R) plantar fascia pain with attempts at WB'ing in and out of her walking boot. She will be receiving a MRI in the next 2 weeks to further determine the etiology of her sympoms.  Unfortunately, she did not notice any difference after dry needling treatment last session. Treatment continued with (R) ankle/foot flexibility and strengthening ex's. Added standing weight shifting activities on AIrex with fair tolerance for (R)LE wb'ing. Patient received manual techniques for STM and also added trial application of Kinesiotape for plantar fascia and arch support.  She reports no adverse effects with this in clinic but will monitor for longer term effectiveness. Will attempt to progress as tolerated.     Kellen Is progressing well towards her goals.   Pt prognosis is Good.     Pt will continue to benefit from skilled outpatient physical therapy to address the deficits listed in the problem list box on initial evaluation, provide pt/family education and to maximize pt's level of independence in the home and community environment.     Pt's spiritual, cultural and educational needs considered and pt agreeable to plan of care and goals.     Anticipated barriers to physical therapy: History of similar symptoms, chronicity of condition    Goals:   GOALS: Short Term Goals:  4 weeks  1.Report decreased  in  pain at worse less than  <   / =  5  /10  to increase tolerance for improved functional actvities. (Not met, progressing)  2. Pt to improve range of motion by 25% to allow for improved functional mobility to allow for improvement in IADLs. MET  3. Increased R ankle MMT 1/2 grade  to increase tolerance for ADL and work activities. (Not met, progressing)  4. Pt  to demonstrate full weightbearing in R LE without pain. (Not met, progressing)  5. Pt to tolerate HEP to improve ROM and independence with ADL's. MET     Long Term Goals: 8 weeks  1.Report decreased  in  pain at worse  less than  <   / =  3  /10  to increase tolerance for improved functional actvities. (Not met, progressing)  2.Pt to improve range of motion by 75% to allow for improved functional mobility to allow for improvement in IADLs. (Not met, progressing)  3.Increased R ankle MMT 1 grade  to increase tolerance for ADL and work activities. (Not met, progressing)  4. Pt will score at least 43% or less on  FOTO outcome assessment  to increase functional activities and mobility. (Not met, progressing)  5. Pt to be Independent with HEP to improve ROM and independence with ADL's. (Not met, progressing)  6. Pt will be able to ambulate 500 ft without increased foot pain. (Not met, progressing)       Plan     Plan of care Certification: 8/18/2022 to 10/18/2022.     Outpatient Physical Therapy 2 times weekly for 8 weeks to include the following interventions: Gait Training, Manual Therapy, Moist Heat/ Ice, Neuromuscular Re-ed, Orthotic Management and Training, Patient Education, Therapeutic Activities and Therapeutic Exercise. Dry needling.        Eddi Storm, PTA

## 2022-10-13 ENCOUNTER — CLINICAL SUPPORT (OUTPATIENT)
Dept: REHABILITATION | Facility: OTHER | Age: 47
End: 2022-10-13
Attending: PODIATRIST
Payer: OTHER GOVERNMENT

## 2022-10-13 DIAGNOSIS — R29.898 ANKLE WEAKNESS: Primary | ICD-10-CM

## 2022-10-13 DIAGNOSIS — Z74.09 IMPAIRED FUNCTIONAL MOBILITY, BALANCE, GAIT, AND ENDURANCE: ICD-10-CM

## 2022-10-13 DIAGNOSIS — M25.671 DECREASED RANGE OF MOTION OF RIGHT ANKLE: ICD-10-CM

## 2022-10-13 DIAGNOSIS — R29.898 WEAKNESS OF FOOT, RIGHT: ICD-10-CM

## 2022-10-13 PROCEDURE — 97110 THERAPEUTIC EXERCISES: CPT

## 2022-10-13 PROCEDURE — 97140 MANUAL THERAPY 1/> REGIONS: CPT

## 2022-10-13 NOTE — PROGRESS NOTES
Physical Therapy Updated Plan of Care     Name: Kellen Fraser  Clinic Number: [id number]    Therapy Diagnosis:   Encounter Diagnoses   Name Primary?    Ankle weakness Yes    Decreased range of motion of right ankle     Weakness of foot, right     Impaired functional mobility, balance, gait, and endurance        Physician: Clemente Young DPM    Visit Date: 10/13/2022    Physician Orders: PT Eval and Treat   Medical Diagnosis from Referral:   S93.691A (ICD-10-CM) - Rupture of plantar fascia of right foot, initial encounter   M79.671,G89.29 (ICD-10-CM) - Chronic heel pain, right   M24.573 (ICD-10-CM) - Equinus contracture of ankle      Evaluation Date: 8/18/2022  Authorization Period Expiration: 12/7/2022  Plan of Care Expiration: 12/22/2022  Visit # / Visits authorized: 5/ 15   Progress Note Due: 10/20/2022  FOTO: 1/ 1      Time In: 3:00 pm  Time Out: 4:00 pm  Total Billable Time: 55 minutes    Precautions: Standard, Weightbearing and cancer    Subjective     Pt reports: facility had to reschedule her MRI to October 26th.    She was compliant with home exercise program.  Response to previous treatment: reduced pain  Functional change: None yet    Pain: 0/10 pain at rest and 7-8/10 with any type of WB'ing.   Location:  right heel, bottom of the foot     Objective   Ankle Range of Motion: 9/20/2022  Ankle Right Left   Dorsiflexion 2 5   Plantarflexion 43 50   Inversion 34 37   Eversion 13 18      Strength: 9/20/2022  Right Ankle    Left Ankle     Dorsiflexion: 4/5 Dorsiflexion: 5/5   Plantarflexion:  3+/5 Plantarflexion: 4+/5   Inversion: 4/5 Inversion: 4+/5   Eversion: 4/5 Eversion: 4+/5         Right LE   Left LE     Hip flexion: 4-/5 Hip flexion: 4/5   Hip extension:  3+/5 Hip extension: 3+/5   Hip abduction: 3+/5 Hip abduction: 3+/5   Hip adduction: 3+/5 Hip adduction 3+/5   Knee extension: 4/5 Knee extension: 4+/5   Knee flexion: 4/5 Knee flexion:               4/5     TREATMENT     Kellen received therapeutic  "exercises to develop strength, endurance, ROM, flexibility, posture and core stabilization for 45 minutes including:    Recumbent bike level 3 for 5 minutes    Towel gastroc/PF stretch 3x30"  Plantar fascia stretch biased with big toe 3x30"  Plantarflexion BTB 3x10 cues for toe curl to target plantar fascia  Inversion GTB 2x10  Eversion GTB 2x10  Dorsiflexion GTB 2x10  Towel scrunches x3 min  Toe Yoga x3 min  Plantar/dorsiflexion on fitterboard x20  CW/CCW on fitterboard x20 ea  seated heel raises 10# KB 3x10  Standing weight shifts on Airex x 2 minutes x 2 trials   +self roller massage (Ribbed roller) to plantar fascia area x 2 minures    Kellen received the following manual therapy techniques: x 15minutes to include: Soft tissue Mobilization to the: R Plantar fascia, distal gastroc . Application of Kinesiotape with 1-"I" strip along plantar fascia with ~ 50% tension and 2nd "I" strip for arch support with ~ 50% tension.  Patient educated on wear time and removal instructions.     40mm needle were inserted into the plantar fascia insertion at the medial calcaneal tubercle and was manipulated for 15-20 seconds and then was removed. Another 40mm needle was insterted into the medial head of the gastroc at the musculotendinous junction. This needle was left in and manipulated every 2-3 min for a total of 10 min --NP      Kellen received hot pack for 5 minutes to R foot.--NP      Home Exercises Provided and Patient Education Provided     Education provided:   - HEP review  -Cueing with exercises    Written Home Exercises Provided: Patient instructed to cont prior HEP.  Exercises were reviewed and Kellen was able to demonstrate them prior to the end of the session.  Kellen demonstrated good  understanding of the education provided.     See EMR under Patient Instructions for exercises provided prior visit.    Assessment   Kellen returns with continued (R) plantar fascia pain with attempts at WB'ing in and out of her " walking boot. She states that her MRI was cancelled and rescheduled to October 26th. Continuation of dry needling followed by toe intrinsic strengthening and gentle weight bearing provided this visit. Will assess response to DN next visit.     Kellen Is progressing well towards her goals.   Pt prognosis is Good.     Pt will continue to benefit from skilled outpatient physical therapy to address the deficits listed in the problem list box on initial evaluation, provide pt/family education and to maximize pt's level of independence in the home and community environment.     Pt's spiritual, cultural and educational needs considered and pt agreeable to plan of care and goals.     Anticipated barriers to physical therapy: History of similar symptoms, chronicity of condition      GOALS: Short Term Goals:  4 weeks  1.Report decreased  in  pain at worse less than  <   / =  5  /10  to increase tolerance for improved functional actvities. (Not met, progressing)  2. Pt to improve range of motion by 25% to allow for improved functional mobility to allow for improvement in IADLs. MET  3. Increased R ankle MMT 1/2 grade  to increase tolerance for ADL and work activities. (Not met, progressing)  4. Pt to demonstrate full weightbearing in R LE without pain. (Not met, progressing)  5. Pt to tolerate HEP to improve ROM and independence with ADL's. MET     Long Term Goals: 8 weeks  1.Report decreased  in  pain at worse  less than  <   / =  3  /10  to increase tolerance for improved functional actvities. (Not met, progressing)  2.Pt to improve range of motion by 75% to allow for improved functional mobility to allow for improvement in IADLs. (Not met, progressing)  3.Increased R ankle MMT 1 grade  to increase tolerance for ADL and work activities. (Not met, progressing)  4. Pt will score at least 43% or less on  FOTO outcome assessment  to increase functional activities and mobility. (Not met, progressing)  5. Pt to be Independent  with HEP to improve ROM and independence with ADL's. (Not met, progressing)  6. Pt will be able to ambulate 500 ft without increased foot pain. (Not met, progressing)       Plan   Certification Period: 10/13/2022 to 12/22/2022  Recommended Treatment Plan: 2 times per week for 10 weeks: Cervical/Lumbar Traction, Electrical Stimulation PRN, Gait Training, Iontophoresis (with PRN), Manual Therapy, Moist Heat/ Ice, Neuromuscular Re-ed, Patient Education, Self Care, Therapeutic Activities, and Therapeutic Exercise  Other Recommendations: modalities prn, ASTYM prn, kinesiotape prn, Functional Dry Needling prn       Therapist: Wellington Retana DPT    I CERTIFY THE NEED FOR THESE SERVICES FURNISHED UNDER THIS PLAN OF TREATMENT AND WHILE UNDER MY CARE    Physician's comments: ________________________________________________________________________________________________________________________________________________      Physician's Name: ___________________________________       Wellington Retana PT

## 2022-10-20 ENCOUNTER — CLINICAL SUPPORT (OUTPATIENT)
Dept: REHABILITATION | Facility: OTHER | Age: 47
End: 2022-10-20
Attending: PODIATRIST
Payer: OTHER GOVERNMENT

## 2022-10-20 DIAGNOSIS — M25.671 DECREASED RANGE OF MOTION OF RIGHT ANKLE: ICD-10-CM

## 2022-10-20 DIAGNOSIS — R29.898 WEAKNESS OF FOOT, RIGHT: ICD-10-CM

## 2022-10-20 DIAGNOSIS — Z74.09 IMPAIRED FUNCTIONAL MOBILITY, BALANCE, GAIT, AND ENDURANCE: ICD-10-CM

## 2022-10-20 DIAGNOSIS — R29.898 ANKLE WEAKNESS: Primary | ICD-10-CM

## 2022-10-20 PROCEDURE — 97110 THERAPEUTIC EXERCISES: CPT

## 2022-10-20 NOTE — PROGRESS NOTES
Physical Therapy Updated Plan of Care     Name: eKllen Fraser  [facility] Number: [id number]    Therapy Diagnosis:   Encounter Diagnoses   Name Primary?    Ankle weakness Yes    Decreased range of motion of right ankle     Weakness of foot, right     Impaired functional mobility, balance, gait, and endurance          Physician: Clemente Young DPM    Visit Date: 10/20/2022    Physician Orders: PT Eval and Treat   Medical Diagnosis from Referral:   S93.691A (ICD-10-CM) - Rupture of plantar fascia of right foot, initial encounter   M79.671,G89.29 (ICD-10-CM) - Chronic heel pain, right   M24.573 (ICD-10-CM) - Equinus contracture of ankle      Evaluation Date: 8/18/2022  Authorization Period Expiration: 12/7/2022  Plan of Care Expiration: 12/22/2022  Visit # / Visits authorized: 5/ 15 (+eval)   Progress Note Due: 11/20/2022  FOTO: 1/ 1      Time In: 3:00 pm  Time Out: 4:00 pm  Total Billable Time: 55 minutes    Precautions: Standard, Weightbearing and cancer    Subjective     Pt reports: she is feeling about the same today. She states that she was not as sore as she was before with the needling but she is not sure if it is helping.    She was compliant with home exercise program.  Response to previous treatment: reduced pain  Functional change: None yet    Pain: 0/10 pain at rest and 7-8/10 with any type of WB'ing.   Location:  right heel, bottom of the foot     Objective   Ankle Range of Motion: 10/20/2022  Ankle Right Left   Dorsiflexion 2 5   Plantarflexion 43 50   Inversion 34 37   Eversion 13 18      Strength: 10/20/2022  Right Ankle    Left Ankle     Dorsiflexion: 4/5 Dorsiflexion: 5/5   Plantarflexion:  3+/5 Plantarflexion: 4+/5   Inversion: 4/5 Inversion: 4+/5   Eversion: 4/5 Eversion: 4+/5         Right LE   Left LE     Hip flexion: 4-/5 Hip flexion: 4/5   Hip extension:  3+/5 Hip extension: 3+/5   Hip abduction: 3+/5 Hip abduction: 3+/5   Hip adduction: 3+/5 Hip adduction 3+/5   Knee extension: 4/5 Knee extension:  "4+/5   Knee flexion: 4/5 Knee flexion:               4/5     TREATMENT     Kellen received therapeutic exercises to develop strength, endurance, ROM, flexibility, posture and core stabilization for 55 minutes including:    Recumbent bike level 3 for 5 minutes    Towel gastroc/PF stretch 3x30"  Plantar fascia stretch biased with big toe 3x30"  Plantarflexion BTB 3x10 cues for toe curl to target plantar fascia  Inversion GTB 2x10  Eversion GTB 2x10  Dorsiflexion GTB 2x10  Towel scrunches x3 min  Toe Yoga x3 min  Plantar/dorsiflexion on fitterboard x20  CW/CCW on fitterboard x20 ea  seated heel raises 10# KB 3x10  Standing weight shifts on Airex x 2 minutes x 2 trials   +self roller massage (Ribbed roller) to plantar fascia area x 2 minures    Kellen received the following manual therapy techniques: x 0 minutes to include: Soft tissue Mobilization to the: R Plantar fascia, distal gastroc . Application of Kinesiotape with 1-"I" strip along plantar fascia with ~ 50% tension and 2nd "I" strip for arch support with ~ 50% tension.  Patient educated on wear time and removal instructions.     40mm needle were inserted into the plantar fascia insertion at the medial calcaneal tubercle and was manipulated for 15-20 seconds and then was removed. Another 40mm needle was insterted into the medial head of the gastroc at the musculotendinous junction. This needle was left in and manipulated every 2-3 min for a total of 10 min --NP      Kellen received hot pack for 5 minutes to R foot.--NP      Home Exercises Provided and Patient Education Provided     Education provided:   - HEP review  -Cueing with exercises    Written Home Exercises Provided: Patient instructed to cont prior HEP.  Exercises were reviewed and Kellen was able to demonstrate them prior to the end of the session.  Kellen demonstrated good  understanding of the education provided.     See EMR under Patient Instructions for exercises provided prior visit.    Assessment "   Kellen returned to therapy with no improvement in heel pain with weight bearing. Pt reports that next Wednesday she goes to get MRI and then meets with her MD to discuss results and treatment plan. Pt states that she will be holding off on therapy until meeting with MD. Plan to re-assess and resume PT POC per MD recommendations.       Kellen Is progressing well towards her goals.   Pt prognosis is Good.     Pt will continue to benefit from skilled outpatient physical therapy to address the deficits listed in the problem list box on initial evaluation, provide pt/family education and to maximize pt's level of independence in the home and community environment.     Pt's spiritual, cultural and educational needs considered and pt agreeable to plan of care and goals.     Anticipated barriers to physical therapy: History of similar symptoms, chronicity of condition      GOALS: Short Term Goals:  4 weeks  1.Report decreased  in  pain at worse less than  <   / =  5  /10  to increase tolerance for improved functional actvities. (Not met, progressing)  2. Pt to improve range of motion by 25% to allow for improved functional mobility to allow for improvement in IADLs. MET  3. Increased R ankle MMT 1/2 grade  to increase tolerance for ADL and work activities. (Not met, progressing)  4. Pt to demonstrate full weightbearing in R LE without pain. (Not met, progressing)  5. Pt to tolerate HEP to improve ROM and independence with ADL's. MET     Long Term Goals: 8 weeks  1.Report decreased  in  pain at worse  less than  <   / =  3  /10  to increase tolerance for improved functional actvities. (Not met, progressing)  2.Pt to improve range of motion by 75% to allow for improved functional mobility to allow for improvement in IADLs. (Not met, progressing)  3.Increased R ankle MMT 1 grade  to increase tolerance for ADL and work activities. (Not met, progressing)  4. Pt will score at least 43% or less on  FOTO outcome assessment  to  increase functional activities and mobility. (Not met, progressing)  5. Pt to be Independent with HEP to improve ROM and independence with ADL's. (Not met, progressing)  6. Pt will be able to ambulate 500 ft without increased foot pain. (Not met, progressing)       Plan   Certification Period: 10/13/2022 to 12/22/2022  Recommended Treatment Plan: 2 times per week for 10 weeks: Cervical/Lumbar Traction, Electrical Stimulation PRN, Gait Training, Iontophoresis (with PRN), Manual Therapy, Moist Heat/ Ice, Neuromuscular Re-ed, Patient Education, Self Care, Therapeutic Activities, and Therapeutic Exercise  Other Recommendations: modalities prn, ASTYM prn, kinesiotape prn, Functional Dry Needling prn       Therapist: Wellington Retana DPT    I CERTIFY THE NEED FOR THESE SERVICES FURNISHED UNDER THIS PLAN OF TREATMENT AND WHILE UNDER MY CARE    Physician's comments: ________________________________________________________________________________________________________________________________________________      Physician's Name: ___________________________________       Wellingotn Retana PT

## 2022-10-26 ENCOUNTER — HOSPITAL ENCOUNTER (OUTPATIENT)
Dept: RADIOLOGY | Facility: HOSPITAL | Age: 47
Discharge: HOME | End: 2022-10-26
Attending: PODIATRIST
Payer: OTHER GOVERNMENT

## 2022-10-26 DIAGNOSIS — M79.671 CHRONIC HEEL PAIN, RIGHT: ICD-10-CM

## 2022-10-26 DIAGNOSIS — S93.691A RUPTURE OF PLANTAR FASCIA OF RIGHT FOOT, INITIAL ENCOUNTER: ICD-10-CM

## 2022-10-26 DIAGNOSIS — M24.573 EQUINUS CONTRACTURE OF ANKLE: ICD-10-CM

## 2022-10-26 DIAGNOSIS — M72.2 PLANTAR FASCIITIS: ICD-10-CM

## 2022-10-26 DIAGNOSIS — G89.29 CHRONIC HEEL PAIN, RIGHT: ICD-10-CM

## 2022-10-26 DIAGNOSIS — M79.671 FOOT PAIN, RIGHT: ICD-10-CM

## 2022-10-26 PROCEDURE — 73718 MRI LOWER EXTREMITY W/O DYE: CPT | Mod: TC,RT

## 2022-10-26 PROCEDURE — 73718 MRI FOOT (HINDFOOT) RIGHT WITHOUT CONTRAST: ICD-10-PCS | Mod: 26,RT,, | Performed by: RADIOLOGY

## 2022-10-26 PROCEDURE — 73718 MRI LOWER EXTREMITY W/O DYE: CPT | Mod: 26,RT,, | Performed by: RADIOLOGY

## 2022-10-28 ENCOUNTER — OFFICE VISIT (OUTPATIENT)
Dept: PODIATRY | Facility: CLINIC | Age: 47
End: 2022-10-28
Payer: OTHER GOVERNMENT

## 2022-10-28 VITALS
WEIGHT: 246.94 LBS | HEIGHT: 64 IN | DIASTOLIC BLOOD PRESSURE: 72 MMHG | SYSTOLIC BLOOD PRESSURE: 132 MMHG | BODY MASS INDEX: 42.16 KG/M2 | HEART RATE: 91 BPM

## 2022-10-28 DIAGNOSIS — G89.29 CHRONIC HEEL PAIN, RIGHT: ICD-10-CM

## 2022-10-28 DIAGNOSIS — S93.691A RUPTURE OF PLANTAR FASCIA OF RIGHT FOOT, INITIAL ENCOUNTER: Primary | ICD-10-CM

## 2022-10-28 DIAGNOSIS — M24.573 EQUINUS CONTRACTURE OF ANKLE: ICD-10-CM

## 2022-10-28 DIAGNOSIS — M72.2 PLANTAR FASCIITIS: ICD-10-CM

## 2022-10-28 DIAGNOSIS — M79.671 CHRONIC HEEL PAIN, RIGHT: ICD-10-CM

## 2022-10-28 PROCEDURE — 99999 PR PBB SHADOW E&M-EST. PATIENT-LVL III: CPT | Mod: PBBFAC,,, | Performed by: PODIATRIST

## 2022-10-28 PROCEDURE — 99213 PR OFFICE/OUTPT VISIT, EST, LEVL III, 20-29 MIN: ICD-10-PCS | Mod: 25,S$PBB,, | Performed by: PODIATRIST

## 2022-10-28 PROCEDURE — 99213 OFFICE O/P EST LOW 20 MIN: CPT | Mod: 25,S$PBB,, | Performed by: PODIATRIST

## 2022-10-28 PROCEDURE — 99213 OFFICE O/P EST LOW 20 MIN: CPT | Mod: PBBFAC,PN | Performed by: PODIATRIST

## 2022-10-28 PROCEDURE — 99999 PR PBB SHADOW E&M-EST. PATIENT-LVL III: ICD-10-PCS | Mod: PBBFAC,,, | Performed by: PODIATRIST

## 2022-10-28 NOTE — PROGRESS NOTES
Subjective:      Patient ID: Kellen Fraser is a 47 y.o. female.    Chief Complaint: Follow-up (Heel pain)    Sharp deep pain bottom of the right heel and arch.  She heard a pop and had intense pain in the bottom of the heel and arch.  Aggravated by increased weight-bearing prolonged standing some shoes.  Fracture boot ambulation, PT, athletic shoes, inserts, nsaids, 2 injections no improvement..  Self-treatment with fracture boot knee walker help some.  X-rays of the heel last visit show no acute osseous abnormality right heel.  PT helps some, but still cannot bear any weight.    MRI shows only rupture medial plantar fascial band at origin.    Review of Systems   Constitutional: Negative for chills, diaphoresis, fever, malaise/fatigue and night sweats.   Cardiovascular:  Negative for claudication, cyanosis, leg swelling and syncope.   Skin:  Negative for color change, dry skin, nail changes, rash, suspicious lesions and unusual hair distribution.   Musculoskeletal:  Negative for falls, joint pain, joint swelling, muscle cramps, muscle weakness and stiffness.   Gastrointestinal:  Negative for constipation, diarrhea, nausea and vomiting.   Neurological:  Negative for brief paralysis, disturbances in coordination, focal weakness, numbness, paresthesias, sensory change and tremors.         Objective:      Physical Exam  Constitutional:       General: She is not in acute distress.     Appearance: She is well-developed. She is not diaphoretic.   Cardiovascular:      Pulses:           Popliteal pulses are 2+ on the right side and 2+ on the left side.        Dorsalis pedis pulses are 2+ on the right side and 2+ on the left side.        Posterior tibial pulses are 2+ on the right side and 2+ on the left side.      Comments: Capillary refill 3 seconds all toes/distal feet, all toes/both feet warm to touch.      Negative lymphadenopathy bilateral popliteal fossa and tarsal tunnel.      Negavie lower extremity edema  bilateral.    Musculoskeletal:      Right ankle: No swelling, deformity, ecchymosis or lacerations. Normal range of motion. Normal pulse.      Right Achilles Tendon: Normal. No defects. Fink's test negative.      Comments: Sharp deep pain to palpation inferior heel right at medial calcaneal tubercle and just distal without ecchymosis, erythema, edema, or cardinal signs infection, and no signs of trauma.    Ankle dorsiflexion decreased at just <10 degrees bilateral with moderate increase with knee flexion bilateral.    Otherwise, Normal angle, base, station of gait. All ten toes without clubbing, cyanosis, or signs of ischemia.  No pain to palpation bilateral lower extremities.  Range of motion, stability, muscle strength, and muscle tone normal bilateral feet and legs.    Lymphadenopathy:      Lower Body: No right inguinal adenopathy. No left inguinal adenopathy.      Comments: Negative lymphadenopathy bilateral popliteal fossa and tarsal tunnel.    Negative lymphangitic streaking bilateral feet/ankles/legs.   Skin:     General: Skin is warm and dry.      Capillary Refill: Capillary refill takes 2 to 3 seconds.      Coloration: Skin is not pale.      Findings: No abrasion, bruising, burn, ecchymosis, erythema, laceration, lesion or rash.      Nails: There is no clubbing.      Comments: Skin is normal age and health appropriate color, turgor, texture, and temperature bilateral lower extremities without ulceration, hyperpigmentation, discoloration, masses nodules or cords palpated.  No ecchymosis, erythema, edema, or cardinal signs of infection bilateral lower extremities.       Neurological:      Mental Status: She is alert and oriented to person, place, and time.      Sensory: No sensory deficit.      Motor: No tremor, atrophy or abnormal muscle tone.      Gait: Gait normal.      Deep Tendon Reflexes:      Reflex Scores:       Patellar reflexes are 2+ on the right side and 2+ on the left side.       Achilles  reflexes are 2+ on the right side and 2+ on the left side.     Comments: Negative tinel sign to percussion sural, superficial peroneal, deep peroneal, saphenous, and posterior tibial nerves right and left ankles and feet.     Psychiatric:         Behavior: Behavior is cooperative.             Assessment:       No diagnosis found.        Plan:       There are no diagnoses linked to this encounter.    I counseled the patient on her conditions, their implications and medical management.    I applied a plantar rest strapping to the patient's right foot to offload symptomatic area, support the arch, and relieve pain.      Continue stretches, inserts, ambulation in the fracture boot to tolerance, PT, knee walker as needed.       New Rx custom orthotics./    Continue over-the-counter NSAIDs p.r.n. per label instructions.      New fx boot    Counseled on conservative treatments including custom orthotics, shoe and activity change, physical therapy, antiinflammatory, and pain medication, and sometimes injections for symptomatic relief.    Counseled on surgical correction, - endoscopic plantar fasciotomy right -  risks and benefits, including, but not limited to recurrence, infection, pain, scar, poor cosmesis, loss of function, arthritis, healing in poor position, new or different ulceration or pre ulcerative callus, nerve pain, nerve damage, numbness, syndromes (RSD), need for future surgical procedures, and or long term use of orthotics, blood clots of leg, lung, heart, brain, death.    Consider carefully, appoint with pcp for surgical clearance, follow here prn as symptoms dictate for consent, post op Rx, and scheduling.        No follow-ups on file.

## 2022-11-09 ENCOUNTER — OFFICE VISIT (OUTPATIENT)
Dept: ALLERGY | Facility: CLINIC | Age: 47
End: 2022-11-09
Payer: OTHER GOVERNMENT

## 2022-11-09 ENCOUNTER — LAB VISIT (OUTPATIENT)
Dept: LAB | Facility: HOSPITAL | Age: 47
End: 2022-11-09
Payer: OTHER GOVERNMENT

## 2022-11-09 VITALS
WEIGHT: 247.38 LBS | SYSTOLIC BLOOD PRESSURE: 140 MMHG | DIASTOLIC BLOOD PRESSURE: 64 MMHG | HEART RATE: 76 BPM | HEIGHT: 64 IN | OXYGEN SATURATION: 100 % | BODY MASS INDEX: 42.23 KG/M2

## 2022-11-09 DIAGNOSIS — K21.9 GASTROESOPHAGEAL REFLUX DISEASE, UNSPECIFIED WHETHER ESOPHAGITIS PRESENT: ICD-10-CM

## 2022-11-09 DIAGNOSIS — Z91.018 FOOD ALLERGY: ICD-10-CM

## 2022-11-09 DIAGNOSIS — M19.90 OSTEOARTHRITIS, UNSPECIFIED OSTEOARTHRITIS TYPE, UNSPECIFIED SITE: ICD-10-CM

## 2022-11-09 DIAGNOSIS — T50.905A ADVERSE EFFECT OF DRUG, INITIAL ENCOUNTER: ICD-10-CM

## 2022-11-09 DIAGNOSIS — J31.0 CHRONIC RHINITIS: ICD-10-CM

## 2022-11-09 DIAGNOSIS — Z85.850 HISTORY OF THYROID CANCER: Primary | ICD-10-CM

## 2022-11-09 DIAGNOSIS — E89.0 POST-SURGICAL HYPOTHYROIDISM: ICD-10-CM

## 2022-11-09 DIAGNOSIS — G89.29 OTHER CHRONIC PAIN: ICD-10-CM

## 2022-11-09 DIAGNOSIS — G43.809 OTHER MIGRAINE WITHOUT STATUS MIGRAINOSUS, NOT INTRACTABLE: ICD-10-CM

## 2022-11-09 PROCEDURE — 99214 OFFICE O/P EST MOD 30 MIN: CPT | Mod: PBBFAC | Performed by: ALLERGY & IMMUNOLOGY

## 2022-11-09 PROCEDURE — 86003 ALLG SPEC IGE CRUDE XTRC EA: CPT | Performed by: ALLERGY & IMMUNOLOGY

## 2022-11-09 PROCEDURE — 86003 ALLG SPEC IGE CRUDE XTRC EA: CPT | Mod: 59 | Performed by: ALLERGY & IMMUNOLOGY

## 2022-11-09 PROCEDURE — 83520 IMMUNOASSAY QUANT NOS NONAB: CPT | Performed by: ALLERGY & IMMUNOLOGY

## 2022-11-09 PROCEDURE — 99999 PR PBB SHADOW E&M-EST. PATIENT-LVL IV: CPT | Mod: PBBFAC,,, | Performed by: ALLERGY & IMMUNOLOGY

## 2022-11-09 PROCEDURE — 99999 PR PBB SHADOW E&M-EST. PATIENT-LVL IV: ICD-10-PCS | Mod: PBBFAC,,, | Performed by: ALLERGY & IMMUNOLOGY

## 2022-11-09 PROCEDURE — 99245 OFF/OP CONSLTJ NEW/EST HI 55: CPT | Mod: S$PBB,,, | Performed by: ALLERGY & IMMUNOLOGY

## 2022-11-09 PROCEDURE — 99245 PR OFFICE CONSULTATION,LEVEL V: ICD-10-PCS | Mod: S$PBB,,, | Performed by: ALLERGY & IMMUNOLOGY

## 2022-11-09 PROCEDURE — 36415 COLL VENOUS BLD VENIPUNCTURE: CPT | Performed by: ALLERGY & IMMUNOLOGY

## 2022-11-09 PROCEDURE — 82785 ASSAY OF IGE: CPT | Performed by: ALLERGY & IMMUNOLOGY

## 2022-11-09 NOTE — PROGRESS NOTES
Kellen Fraser is referred by Dr. Herman Rivera for a consult regarding multiple medication and food allergies.  She is here alone.  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    She has a history of multiple drug allergies or intolerances including:    Childhood Penicillin hives  1994 Betadine rash  1995 Bactrim vomiting  1997 Erythromycin IV rash  1999 Reglan vomiting  1999 Toradol increased pain   2002 Demerol IV rash  2012 Morphine IV shortness of breath  2017 Influenza vaccine rash.    She also has a history of multiple food reactions including:  April 2018 chargrilled oysters nausea, vomiting, diarrhea, and abdominal cramping lasting 12 days  December 2018 raw oysters and oysters cooked in a dressing nausea, vomiting, diarrhea, and abdominal cramping lasting 15 days  May 2019 fried oysters nausea, vomiting, cramping, and dizziness  2021 fresh kiwi tongue swelling  2021 estelle tongue swelling  February 2021 crawfish swelling of lips  June 2021 scallops possibly with cross contamination with oysters nausea, vomiting, diarrhea, cramping pain lasting 7 days  January 2022 crawfish swelling of lips  January 2022 shrimp itchy rash on face and chest  February 2022 shrimp itchy rash on face, chest, mouth, ears, and scalp  February 2022 French fries with presumed cross contamination with oysters nausea, vomiting, diarrhea  October 2022 salsa with tomatoes, lime, cilantro swelling of tongue  October 2022 pineapple swelling of tongue.    She only occasionally has rhinitis with sneezing and clear rhinorrhea.  Her most recent episode lasted two days.  She took Zyrtec and Benadryl with resolution.     She does say that she gets mucus in her throat, postnasal drip, and a mild cough after eating and drinking any substance.  She denies any hoarseness, sore throat, or difficulty swallowing.     She only occasionally has heartburn.     She had thyroid cancer in 2004 and had a thyroidectomy.  She is on replacement.     She  had her gallbladder out in 2012.    She has migraine headaches and takes Fioricet.  They are usually on the right side.    She has ruptured plantar fasciitis on the right and is to have surgery soon. She is currently in a boot.    She has chronic pain in her back and hips.  She thinks she has DJD.    She has four children girls ages 29, 24, and 20. She has a stepson age 24.     [facility] PEQ ALLERGY QUESTIONNAIRE LONG 11/8/2022   Head or facial pain: No symptoms   Eyes: No symptoms   Do you have difficulty wearing contacts, if applicable?  No   Ears: No symptoms   Do you have ear infections? No   Do you have ear tubes? No   Did you have ear surgery? No   Nose: No symptoms   Did you have a blocked nose? No   Did you have nasal surgery? No   Has your nose ever been broken? No   Throat: No symptoms   Sinuses: No symptoms   Have you had x-rays done for your sinuses? No   Have you had a CT scan done for your sinuses? No   Lungs: No symptoms   Was your last chest x-ray normal or abnormal, if applicable? Normal   Have you ever has a tuberculosis skin test?  Yes   When did you have a tuberculosis skin test? 2015   Was your tuberculosis skin test positive or negative? Negative   Have you ever had a lung-function test? Yes   When was your lung-function test? 2014   Have you had a flu shot this year? No   Have you had the pneumonia vaccine?  No   Do you have any known problems with your immune system? No   Do you suspect you may have problems with your immune system? No   Do you have frequent infections? No   Skin: No symptoms   When did these symptoms first occur? On going issues with Foods and Medication   Are they getting worse or better? Worse   How often do these symptoms occur? Depends on what i eat or what medication is goven to me   When do these symptoms occur? Within minutes   Do they occur year round? Yes   If there is any seasonal variation in your symptoms, when are they worse? N/a   Is there a particular time of the  day or night when the symptoms are worse? N/a   Is there anything you have identified, which can cause symptoms or make them worse? (such as dust, grass, plant or animal products, mold, heat, cold, strong odors, exercise) Shellfish   Is there anything you have identified, which can make symptoms better?  N/a   What medications have you tried in the past to help control these symptoms?  Benadryl or any type of antihistamine   Please list all the vitamins or herbal medications you are taking. Jennifer Bennett   Have you ever seen an allergist for these symptoms? No   Have you ever had skin tests? No   Have you ever had any other type of allergy testing? No   Have you ever had allergy shots? No   Do you have food allergies? Yes   Please list the food(s), type of reaction(s) and last date of reaction(s) Shellfish-mollusk-(oysters and scallops) severe gastroenteritis-2018-current. Shellfish-crustaceans-crawfish (swelling of lips) June 2021. Shrimp (itching hives chest/face/in month/in ears. Jan 2022. Fresh Kiwi/[place]/Pineapple. (Pain and Swelling of tougue)   Do you have insect allergies? No   Do you have latex allergies? No   Constitution No symptoms   Cardiovascular: No symptoms   Gastrointestinal: No symptoms   Genital/ urinary: No symptoms   Musculoskeletal: Back pain, Joint pain, Neck pain   Endocrine: No symptoms   Hematologic: No symptoms   Please note which family members have allergies or asthma and specify which they have. None   How long have you lived at your current address? 2 years   Has your residence ever had water or flood damage? No   Is there any evidence of mold in the house? No   Does your house have: Central air conditioning, Electric heat   Does your bedroom have: Venetian blinds   What type of pillow do you have, for example feather, foam and fiberfill?  Fiberfilled   Do you have pets? Yes   Please list the type of pet(s), how many, how long you have had the pet(s), whether or not the pet(s) are  living inside or outside, and whether the pet(s) aggravate your symptoms.  Cat-2019 inside, Dog-2019 inside   Does anyone in the house smoke? Yes   What is your occupation? [employer]    Do any of the symptoms increase at school or work? Please specify which symptoms, if applicable.  N/a   Do you suspect anything at work or school, which may be causing your symptoms? If so, please elaborate.  N/a   Is there anything else that might be important for the doctor to know?  Allergy to a list of medications   Did you find this questionnaire helpful in addressing your symptoms?  Yes      Physical Examination:  General: Well-developed, well-nourished, no acute distress.  Head: No sinus tenderness.  Eyes: Conjunctivae:  No bulbar or palpebral conjunctival injection.  Ears: EAC's clear.  TM's clear.  No pre-auricular nodes.  Nose: Nasal Mucosa:  Pink.  Septum: No apparent deviation.  Turbinates:  No significant edema.  Polyps/Mass:  None visible.  Teeth/Gums:  No bleeding noted.  Oropharynx: No exudates.  Neck: Supple without thyromegaly. No cervical lymphadenopathy.    Respiratory/Chest: Effort: Good.  Auscultation:  Clear bilaterally.  Cardiovascular:  No murmur, rubs, or gallop heard.   GI:  Non-tender.  No masses.  No organomegaly.  Extremities:  No cyanosis, clubbing, or edema.  Right foot in boot.  Skin: Good turgor.  No urticaria or angioedema.  Neuro/Psych: Oriented x 3.    Assessment:  1. Chronic mild rhinitis, consider allergic.   2. Consider food allergy.   3. Multiple drug reactions and intolerances.  4. History of penicillin allergy.   5. GERD.   6. Consider LPR.   7. Thyroid cancer 2004 with thyroidectomy on replacement.  8. S/P cholecystectomy 2012.   9. Migraine headaches.  10. Ruptured right plantar fasciitis.   11. DJD.    Recommendations:  1. Laboratory as ordered.  2. Consider food skin testing off antihistamines in the future if needed.   3. Consider penicillin skin testing in the  future.   4. Return to clinic in two weeks.    Time attributed to the following activities: preparing to see the patient, obtaining and/or reviewing separately obtained history, performing a medically appropriate examination and/or evaluation, counseling and education the patient/family/caregiver, documenting clinical information in the electronic or other health record, independently interpreting results (not separately reported) and communicating results to the patient/family/caregiver, care coordination (not separately reported), and ordering medications, tests, or procedures.  75 minutes.

## 2022-11-10 LAB
IGE SERPL-ACNC: <35 IU/ML (ref 0–100)
TRYPTASE LEVEL: 3.9 NG/ML

## 2022-11-11 LAB
ALLERGEN NAME: NORMAL
ALLERGEN NAME: NORMAL
ALLERGEN RESULT: NORMAL
ALLERGEN RESULT: NORMAL

## 2022-11-15 LAB
A ALTERNATA IGE QN: <0.1 KU/L
A FUMIGATUS IGE QN: <0.1 KU/L
BERMUDA GRASS IGE QN: <0.1 KU/L
CAT DANDER IGE QN: <0.1 KU/L
CEDAR IGE QN: <0.1 KU/L
CLAM IGE QN: <0.1 KU/L
CRAB IGE QN: <0.1 KU/L
CRAWFISH IGE QN: <0.1 KU/L
D FARINAE IGE QN: <0.1 KU/L
D PTERONYSS IGE QN: <0.1 KU/L
DEPRECATED A ALTERNATA IGE RAST QL: NORMAL
DEPRECATED A FUMIGATUS IGE RAST QL: NORMAL
DEPRECATED BERMUDA GRASS IGE RAST QL: NORMAL
DEPRECATED CAT DANDER IGE RAST QL: NORMAL
DEPRECATED CEDAR IGE RAST QL: NORMAL
DEPRECATED CLAM IGE RAST QL: NORMAL
DEPRECATED CRAB IGE RAST QL: NORMAL
DEPRECATED CRAWFISH IGE RAST QL: NORMAL
DEPRECATED D FARINAE IGE RAST QL: NORMAL
DEPRECATED D PTERONYSS IGE RAST QL: NORMAL
DEPRECATED DOG DANDER IGE RAST QL: NORMAL
DEPRECATED ELDER IGE RAST QL: NORMAL
DEPRECATED ENGL PLANTAIN IGE RAST QL: NORMAL
DEPRECATED KIWIFRUIT IGE RAST QL: NORMAL
DEPRECATED LOBSTER IGE RAST QL: NORMAL
DEPRECATED MANGO IGE RAST QL: NORMAL
DEPRECATED OYSTER IGE RAST QL: NORMAL
DEPRECATED PECAN/HICK TREE IGE RAST QL: NORMAL
DEPRECATED PINEAPPLE IGE RAST QL: NORMAL
DEPRECATED ROACH IGE RAST QL: NORMAL
DEPRECATED SCALLOP IGE RAST QL: NORMAL
DEPRECATED SHRIMP IGE RAST QL: NORMAL
DEPRECATED SILVER BIRCH IGE RAST QL: NORMAL
DEPRECATED TIMOTHY IGE RAST QL: NORMAL
DEPRECATED TOMATO IGE RAST QL: NORMAL
DEPRECATED WEST RAGWEED IGE RAST QL: NORMAL
DEPRECATED WHITE OAK IGE RAST QL: NORMAL
DOG DANDER IGE QN: <0.1 KU/L
ELDER IGE QN: <0.1 KU/L
ENGL PLANTAIN IGE QN: <0.1 KU/L
KIWIFRUIT IGE QN: <0.1 KU/L
LOBSTER IGE QN: <0.1 KU/L
MANGO IGE QN: <0.1 KU/L
OYSTER IGE QN: <0.1 KU/L
PECAN/HICK TREE IGE QN: <0.1 KU/L
PINEAPPLE IGE QN: <0.1 KU/L
ROACH IGE QN: <0.1 KU/L
SCALLOP IGE QN: <0.1 KU/L
SHRIMP IGE QN: <0.1 KU/L
SILVER BIRCH IGE QN: <0.1 KU/L
TIMOTHY IGE QN: <0.1 KU/L
TOMATO IGE QN: <0.1 KU/L
WEST RAGWEED IGE QN: <0.1 KU/L
WHITE OAK IGE QN: <0.1 KU/L

## 2022-11-21 ENCOUNTER — OFFICE VISIT (OUTPATIENT)
Dept: ALLERGY | Facility: CLINIC | Age: 47
End: 2022-11-21
Payer: OTHER GOVERNMENT

## 2022-11-21 VITALS
HEART RATE: 95 BPM | SYSTOLIC BLOOD PRESSURE: 138 MMHG | OXYGEN SATURATION: 98 % | BODY MASS INDEX: 42.23 KG/M2 | WEIGHT: 247.38 LBS | DIASTOLIC BLOOD PRESSURE: 62 MMHG | HEIGHT: 64 IN

## 2022-11-21 DIAGNOSIS — Z91.018 FOOD ALLERGY: ICD-10-CM

## 2022-11-21 DIAGNOSIS — E89.0 POST-SURGICAL HYPOTHYROIDISM: ICD-10-CM

## 2022-11-21 DIAGNOSIS — K21.9 GASTROESOPHAGEAL REFLUX DISEASE, UNSPECIFIED WHETHER ESOPHAGITIS PRESENT: ICD-10-CM

## 2022-11-21 DIAGNOSIS — J31.0 CHRONIC RHINITIS: Primary | ICD-10-CM

## 2022-11-21 PROCEDURE — 95004 PERQ TESTS W/ALRGNC XTRCS: CPT | Mod: S$PBB,,, | Performed by: ALLERGY & IMMUNOLOGY

## 2022-11-21 PROCEDURE — 99214 PR OFFICE/OUTPT VISIT, EST, LEVL IV, 30-39 MIN: ICD-10-PCS | Mod: S$PBB,25,, | Performed by: ALLERGY & IMMUNOLOGY

## 2022-11-21 PROCEDURE — 99999 PR PBB SHADOW E&M-EST. PATIENT-LVL IV: CPT | Mod: PBBFAC,,, | Performed by: ALLERGY & IMMUNOLOGY

## 2022-11-21 PROCEDURE — 99214 OFFICE O/P EST MOD 30 MIN: CPT | Mod: S$PBB,25,, | Performed by: ALLERGY & IMMUNOLOGY

## 2022-11-21 PROCEDURE — 99214 OFFICE O/P EST MOD 30 MIN: CPT | Mod: PBBFAC | Performed by: ALLERGY & IMMUNOLOGY

## 2022-11-21 PROCEDURE — 95004 PR ALLERGY SKIN TESTS,ALLERGENS: ICD-10-PCS | Mod: S$PBB,,, | Performed by: ALLERGY & IMMUNOLOGY

## 2022-11-21 PROCEDURE — 99999 PR PBB SHADOW E&M-EST. PATIENT-LVL IV: ICD-10-PCS | Mod: PBBFAC,,, | Performed by: ALLERGY & IMMUNOLOGY

## 2022-11-21 PROCEDURE — 95004 PERQ TESTS W/ALRGNC XTRCS: CPT | Mod: PBBFAC | Performed by: ALLERGY & IMMUNOLOGY

## 2022-11-21 NOTE — PROGRESS NOTES
Kellen Fraser returns to clinic today for continued evaluation of a possible food allergy and multiple drug allergies. She is here with her  Dylan.  She was last seen November 9, 2022.    Since her last visit, she has continued to avoid oysters and all shellfish.  She has not had any further symptoms.     She denies any rhinitis or conjunctivitis.     She denies any cough, wheezing, or shortness of breath.     She is interested in having penicillin skin testing in the future.    [facility] PE[facility] ALLERGY QUESTIONNAIRE SHORT 11/21/2022   Head or facial pain: No symptoms   Ears: No symptoms   Hearing loss? -   Nose: No symptoms   Throat: No symptoms   Trouble swallowing? -   Eyes: No symptoms   Eye discharge? -   Lungs: No symptoms   Wheezing? -   Chest tightness? -   Skin: No symptoms      Physical Examination:  General: Well-developed, well-nourished, no acute distress.  Head: No sinus tenderness.  Eyes: Conjunctivae:  No bulbar or palpebral conjunctival injection.  Ears: EAC's clear.  TM's clear.  No pre-auricular nodes.  Nose: Nasal Mucosa:  Pink.  Septum: No apparent deviation.  Turbinates:  No significant edema.  Polyps/Mass:  None visible.  Teeth/Gums:  No bleeding noted.  Oropharynx: No exudates.  Neck: Supple without thyromegaly. No cervical lymphadenopathy.    Respiratory/Chest: Effort: Good.  Auscultation:  Clear bilaterally.  Extremities:  No cyanosis, clubbing, or edema.  Right foot in boot.  Skin: Good turgor.  No urticaria or angioedema.  Neuro/Psych: Oriented x 3.    Laboratory 11/09/2022:   IgE level:  Less than 35.   ImmunoCAP:  Negative inhalants and foods.   Serum tryptase:  3.9.    Food skin tests 11/21/2022: 3+ histamine control. All tests were negative.    Assessment:  1. Chronic mild rhinitis, consider allergic.   2. Consider food allergy.   3. Multiple drug reactions and intolerances.  4. History of penicillin allergy.   5. GERD.   6. Consider LPR.   7. Thyroid cancer 2004 with thyroidectomy on  replacement.  8. S/P cholecystectomy 2012.   9. Migraine headaches.  10. Ruptured right plantar fasciitis.   11. DJD.    Recommendations:  1. Discussed possible challenge to shrimp in the future.  She will call for follow-up.  2. Consider penicillin skin testing in the future.   3. Return to clinic in the future.

## 2023-03-10 ENCOUNTER — PATIENT MESSAGE (OUTPATIENT)
Dept: PODIATRY | Facility: CLINIC | Age: 48
End: 2023-03-10

## 2023-03-17 ENCOUNTER — OFFICE VISIT (OUTPATIENT)
Dept: PODIATRY | Facility: CLINIC | Age: 48
End: 2023-03-17
Payer: OTHER GOVERNMENT

## 2023-03-17 ENCOUNTER — TELEPHONE (OUTPATIENT)
Dept: PODIATRY | Facility: CLINIC | Age: 48
End: 2023-03-17

## 2023-03-17 VITALS
HEART RATE: 76 BPM | BODY MASS INDEX: 42.23 KG/M2 | HEIGHT: 64 IN | SYSTOLIC BLOOD PRESSURE: 146 MMHG | WEIGHT: 247.38 LBS | DIASTOLIC BLOOD PRESSURE: 69 MMHG

## 2023-03-17 DIAGNOSIS — M24.573 EQUINUS CONTRACTURE OF ANKLE: ICD-10-CM

## 2023-03-17 DIAGNOSIS — M72.2 PLANTAR FASCIITIS: Primary | ICD-10-CM

## 2023-03-17 DIAGNOSIS — S93.691A RUPTURE OF PLANTAR FASCIA OF RIGHT FOOT, INITIAL ENCOUNTER: ICD-10-CM

## 2023-03-17 DIAGNOSIS — M79.671 CHRONIC HEEL PAIN, RIGHT: ICD-10-CM

## 2023-03-17 DIAGNOSIS — G89.29 CHRONIC HEEL PAIN, RIGHT: ICD-10-CM

## 2023-03-17 PROCEDURE — 29540 PR STRAPPING; ANKLE &/OR FOOT: ICD-10-PCS | Mod: S$PBB,RT,, | Performed by: PODIATRIST

## 2023-03-17 PROCEDURE — 99214 OFFICE O/P EST MOD 30 MIN: CPT | Mod: 25,S$PBB,, | Performed by: PODIATRIST

## 2023-03-17 PROCEDURE — 29540 STRAPPING ANKLE &/FOOT: CPT | Mod: PBBFAC,PN,RT | Performed by: PODIATRIST

## 2023-03-17 PROCEDURE — 99999 PR PBB SHADOW E&M-EST. PATIENT-LVL III: CPT | Mod: PBBFAC,,, | Performed by: PODIATRIST

## 2023-03-17 PROCEDURE — 29540 STRAPPING ANKLE &/FOOT: CPT | Mod: S$PBB,RT,, | Performed by: PODIATRIST

## 2023-03-17 PROCEDURE — 99213 OFFICE O/P EST LOW 20 MIN: CPT | Mod: PBBFAC,PN | Performed by: PODIATRIST

## 2023-03-17 PROCEDURE — 99214 PR OFFICE/OUTPT VISIT, EST, LEVL IV, 30-39 MIN: ICD-10-PCS | Mod: 25,S$PBB,, | Performed by: PODIATRIST

## 2023-03-17 PROCEDURE — 99999 PR PBB SHADOW E&M-EST. PATIENT-LVL III: ICD-10-PCS | Mod: PBBFAC,,, | Performed by: PODIATRIST

## 2023-03-17 RX ORDER — LIDOCAINE HYDROCHLORIDE 20 MG/ML
JELLY TOPICAL
Qty: 30 ML | Refills: 2 | Status: SHIPPED | OUTPATIENT
Start: 2023-03-17

## 2023-03-17 NOTE — TELEPHONE ENCOUNTER
Staff informed patient when scheduled for PT to do nonweightbearing to learn how to use knee walker.    Patient verbalized understanding.

## 2023-03-17 NOTE — PROGRESS NOTES
Subjective:      Patient ID: Kellen Fraser is a 47 y.o. female.    Chief Complaint: Consult (Surgery )    Sharp deep pain in the bottom right heel.  Gradual onset following increased activity greater than 18 months ago.  Minimal improvement since.  She was beginning to recover from the plantar fasciitis and suffered a rupture of the medial origin plantar fascia right foot.  This was confirmed on MRI.  Due to family [facility] she is had to discontinue her treatment for the past several months.  She has continued sharp deep focal pain in the same area and would like surgical treatment to try to alleviate.  She wears fracture boot all the time.  It is unable to ambulate in regular shoe gear.  Walks with crutches for stability.    Review of Systems   Constitutional: Negative for chills, diaphoresis, fever, malaise/fatigue and night sweats.   Cardiovascular:  Negative for claudication, cyanosis, leg swelling and syncope.   Skin:  Negative for color change, dry skin, nail changes, rash, suspicious lesions and unusual hair distribution.   Musculoskeletal:  Negative for falls, joint pain, joint swelling, muscle cramps, muscle weakness and stiffness.   Gastrointestinal:  Negative for constipation, diarrhea, nausea and vomiting.   Neurological:  Negative for brief paralysis, disturbances in coordination, focal weakness, numbness, paresthesias, sensory change and tremors.         Objective:      Physical Exam  Constitutional:       General: She is not in acute distress.     Appearance: She is well-developed. She is not diaphoretic.   Cardiovascular:      Pulses:           Popliteal pulses are 2+ on the right side and 2+ on the left side.        Dorsalis pedis pulses are 2+ on the right side and 2+ on the left side.        Posterior tibial pulses are 2+ on the right side and 2+ on the left side.      Comments: Capillary refill 3 seconds all toes/distal feet, all toes/both feet warm to touch.      Negative  lymphadenopathy bilateral popliteal fossa and tarsal tunnel.      Negavie lower extremity edema bilateral.    Musculoskeletal:      Right ankle: No swelling, deformity, ecchymosis or lacerations. Normal range of motion. Normal pulse.      Right Achilles Tendon: Normal. No defects. Fink's test negative.      Comments: Sharp deep pain to palpation inferior heel right at medial calcaneal tubercle without ecchymosis, erythema, edema, or cardinal signs infection, and no signs of trauma.    Ankle dorsiflexion approximates 10° bilateral with moderate increase with knee flexion bilateral.    Otherwise, Normal angle, base, station of gait. All ten toes without clubbing, cyanosis, or signs of ischemia.  No pain to palpation bilateral lower extremities.  Range of motion, stability, muscle strength, and muscle tone normal bilateral feet and legs.    Lymphadenopathy:      Lower Body: No right inguinal adenopathy. No left inguinal adenopathy.      Comments: Negative lymphadenopathy bilateral popliteal fossa and tarsal tunnel.    Negative lymphangitic streaking bilateral feet/ankles/legs.   Skin:     General: Skin is warm and dry.      Capillary Refill: Capillary refill takes 2 to 3 seconds.      Coloration: Skin is not pale.      Findings: No abrasion, bruising, burn, ecchymosis, erythema, laceration, lesion or rash.      Nails: There is no clubbing.      Comments:   Skin is normal age and health appropriate color, turgor, texture, and temperature bilateral lower extremities without ulceration, hyperpigmentation, discoloration, masses nodules or cords palpated.  No ecchymosis, erythema, edema, or cardinal signs of infection bilateral lower extremities.     Neurological:      Mental Status: She is alert and oriented to person, place, and time.      Sensory: No sensory deficit.      Motor: No tremor, atrophy or abnormal muscle tone.      Gait: Gait normal.      Deep Tendon Reflexes:      Reflex Scores:       Patellar reflexes  are 2+ on the right side and 2+ on the left side.       Achilles reflexes are 2+ on the right side and 2+ on the left side.     Comments: Negative tinel sign to percussion sural, superficial peroneal, deep peroneal, saphenous, and posterior tibial nerves right and left ankles and feet.    Negative allodynia both feet   Psychiatric:         Behavior: Behavior is cooperative.             Assessment:       Encounter Diagnoses   Name Primary?    Plantar fasciitis Yes    Rupture of plantar fascia of right foot, initial encounter     Equinus contracture of ankle     Chronic heel pain, right          Plan:       Kellen was seen today for consult.    Diagnoses and all orders for this visit:    Plantar fasciitis    Rupture of plantar fascia of right foot, initial encounter    Equinus contracture of ankle    Chronic heel pain, right      I counseled the patient on her conditions, their implications and medical management.        Consult Physical therapy to assess ability to safely non weightbear right one-week postop.      Thorough discussion of the endoscopic plantar fasciotomy procedure and the open plantar fasciotomy procedure.      Counseled on conservative treatments including custom orthotics, shoe and activity change, physical therapy, antiinflammatory, and pain medication, and sometimes injections for symptomatic relief.    Counseled on surgical correction,-endoscopic plantar fasciotomy right foot-open procedure if necessary right foot.  risks and benefits, including, but not limited to recurrence, infection, pain, scar, poor cosmesis, loss of function, arthritis, healing in poor position, new or different ulceration or pre ulcerative callus, nerve pain, nerve damage, numbness, syndromes (RSD), need for future surgical procedures, and or long term use of orthotics, blood clots of leg, lung, heart, brain, death.    Consider carefully, appoint with pcp for surgical clearance, follow here prn as symptoms dictate for  consent, post op Rx, and scheduling.            Follow up in about 1 month (around 4/17/2023) for Consent /scheduling EPF right.

## 2023-03-23 ENCOUNTER — CLINICAL SUPPORT (OUTPATIENT)
Dept: REHABILITATION | Facility: OTHER | Age: 48
End: 2023-03-23
Attending: PODIATRIST
Payer: OTHER GOVERNMENT

## 2023-03-23 DIAGNOSIS — S93.691A RUPTURE OF PLANTAR FASCIA OF RIGHT FOOT, INITIAL ENCOUNTER: ICD-10-CM

## 2023-03-23 DIAGNOSIS — R29.898 ANKLE WEAKNESS: ICD-10-CM

## 2023-03-23 DIAGNOSIS — R29.898 WEAKNESS OF FOOT, RIGHT: Primary | ICD-10-CM

## 2023-03-23 DIAGNOSIS — Z74.09 IMPAIRED FUNCTIONAL MOBILITY, BALANCE, GAIT, AND ENDURANCE: ICD-10-CM

## 2023-03-23 DIAGNOSIS — M24.573 EQUINUS CONTRACTURE OF ANKLE: ICD-10-CM

## 2023-03-23 DIAGNOSIS — M72.2 PLANTAR FASCIITIS: ICD-10-CM

## 2023-03-23 PROCEDURE — 97161 PT EVAL LOW COMPLEX 20 MIN: CPT

## 2023-03-23 PROCEDURE — 97110 THERAPEUTIC EXERCISES: CPT

## 2023-03-23 PROCEDURE — 97116 GAIT TRAINING THERAPY: CPT

## 2023-03-27 NOTE — PLAN OF CARE
[facility]  Physical Therapy Initial Evaluation  Date: 3/23/2023   Name: Kellen Fraser  Clinic Number: [id number]    Therapy Diagnosis:   Encounter Diagnoses   Name Primary?    Plantar fasciitis     Rupture of plantar fascia of right foot, initial encounter     Equinus contracture of ankle     Weakness of foot, right Yes    Ankle weakness     Impaired functional mobility, balance, gait, and endurance      Physician: Clemente Young, YOLANDA    Physician Orders: PT Eval and Treat   Medical Diagnosis from Referral:   M72.2 (ICD-10-CM) - Plantar fasciitis   S93.691A (ICD-10-CM) - Rupture of plantar fascia of right foot, initial encounter   M24.573 (ICD-10-CM) - Equinus contracture of ankle     Evaluation Date: 3/23/2023  Authorization Period Expiration: 7/14/2023  Plan of Care Expiration: 5/23/2023  Visit # / Visits authorized: 1/ 1 (pending) Progress Note Due: 4/23/2023  FOTO: 1/ 1    Precautions: Standard, Weightbearing and cancer    Time In: 9:00 am  Time Out: 10:00 am  Total Appointment Time (timed & untimed codes): 60 minutes    Subjective   Date of onset: 18 months ago  History of current condition - Kellen reports: she has been have foot and heel pain for over a year and half. Came to therapy a couple of times with some relief, but still very limited. A few months ago the plantar fascia actually ruptured and this was confirmed via MRI. She is planning to get surgery to correct medial plantar fascia tear sometime in April. Her surgeon would like for her to do PT prior to surgery to practice non-weight bearing and to strengthen the foot prior to surgery.     RYAN: PF rupture  Any falls: no  Any pain Plantar fascia pain: yes  Any pain Deltoid ligament: no  Any pain ATFL, calcaneou fibular or posterior talofibular L: no  Any ankle bruise: no  Pain radiates: no  Pain constant or intermittent: constant  Any injection: no      Current 0/10, worst 7/10, best 0/10   Location: right medial  "heel/foot   Description: Aching and Sharp  Aggravating Factors: Standing, Touching, Walking, and Getting out of bed/chair  Easing Factors: rest and elevation    Prior Therapy: yes multiple times with min relief  Social History: lives in trailer 2 steps to enter. lives with their spouse  Occupation: Admin work for [employer]  Prior Level of Function: independent  Current Level of Function: increased pain and decreased tolerance to weightbearing, dorsiflexion, walking, standing, driving, sitting, sit to stand, lifting, and household chores       Pts goals: "get my foot strong before surgery"      Imaging: MRI FOOT (HINDFOOT) RIGHT WITHOUT CONTRAST     CLINICAL HISTORY:  Heel pain, chronic;plantar fascial rupture - other?;  Other sprain of right foot, initial encounter     TECHNIQUE:  Multiplanar multisequence MRI examination of the RIGHT ankle.     COMPARISON:  02/04/2022     FINDINGS:  TENDONS:  Posterior tibial, flexor digitorum longus, flexor hallucis longus, peroneus longus and brevis, and extensor tendons are intact.  Achilles tendon is intact.     LIGAMENTS:  Anterior talofibular, calcaneofibular, posterior talofibular ligaments are intact. Anterior and posterior tibiofibular (syndesmotic) ligaments are intact. Deltoid, spring, and Lis Franc ligaments are intact.     MUSCLES:  Normal bulk and signal.The tarsal tunnel muscles including abductor hallucis longus, flexor digitorum brevis, and abductor digiti minimi are without denervation or atrophy.     BONES:  No fracture, contusion, or marrow replacement process.     CARTILAGE:  Tibiotalar, subtalar, and joint spaces of the midfoot are preserved.  Talar dome is normal.     MISC:  There is  edema of the soft tissues plantar to the plantar fascia and edema of the tissues deep to the aponeurosis. There is  plantar fascial thickening measuring approximately 6 mm.  There is  osseous edema at the calcaneal insertion of the plantar fascia and calcaneal spur formation. There is " early rupture of the fascia.  Coronal fat saturated PD sequence 26, for example, as well as sagittal STIR image 13 and axial PD FS image 34 demonstrate small focal suspected rupture of medial cord of the plantar fascia at insertional level.  There is no evidence of plantar fibromatosis. Findings are consistent with plantar fasciitis with small focal rupture of plantar fascia, medial cord, at calcaneal attachment..  Lateral cord of plantar fascia demonstrates normal appearance and insertion upon the base of the 5th MT. Sinus tarsi is normal.     Impression:     Plantar fasciitis with small focal rupture of medial cord plantar fascia at calcaneal attachment.    Medical History:   Past Medical History:   Diagnosis Date    Allergy     BRCA negative 11/2014    Cervical disc disease     C5-C6, impacting thecal sac    Gallbladder sludge     Hypothyroidism     Thyroid cancer     Uterine fibroid        Surgical History:   Kellen Fraser  has a past surgical history that includes Thyroidectomy (2004); Cholecystectomy (2012); Foot surgery (Right); Breast biopsy (Right); and Epidural steroid injection (N/A, 9/10/2019).    Medications:   Kellen has a current medication list which includes the following prescription(s): albuterol, butalbital-acetaminophen-caffeine -40 mg, levothyroxine, lidocaine hcl 2%, lidocaine hcl 2%, lidocaine hcl 2%, meloxicam, and methocarbamol, and the following Facility-Administered Medications: sodium chloride 0.9% and aluminum & magnesium hydroxide-simethicone.    Allergies:   Review of patient's allergies indicates:   Allergen Reactions    Morphine Shortness Of Breath    Shellfish containing products Hives and Itching    Dilaudid [hydromorphone (bulk)] Hives    Dilaudid [hydromorphone] Hives    Erythromycin Hives    Influenza virus vaccines Hives    Meperidine Hives     Other reaction(s): Rash, Urticaria, Rash, Urticaria    Penicillins Hives     Other reaction(s): Other: hives    Reglan  [metoclopramide] Nausea And Vomiting    Toradol [ketorolac] Nausea And Vomiting    Oyster shell Diarrhea and Nausea And Vomiting    Sulfamethoxazole-trimethoprim Nausea And Vomiting     Other reaction(s): Unknown        Objective     Observation: pt presents to clinic with mobility scooter\    Sensation: Light touch:intact to light touch    GAIT DEVIATIONS: Kellen while ambulating with crutches displays unsteady gait;decreased step length;decreased weight shift;antalgic gait    Ankle Range of Motion:   Ankle Right Left   Dorsiflexion 6 10   Plantarflexion 42 50   Inversion 38 40   Eversion 14 18      Strength:   Right Ankle   Left Ankle    Dorsiflexion: 4+/5 Dorsiflexion: 5/5   Plantarflexion:  4-/5 Plantarflexion: 5/5   Inversion: 4/5 Inversion: 5/5   Eversion: 4/5 Eversion: 5/5       Right LE  Left LE    Hip flexion: 4/5 Hip flexion: 4/5   Hip extension:  3+/5 Hip extension: 3+/5   Hip abduction: 3+/5 Hip abduction: 3+/5   Hip adduction: 3+/5 Hip adduction 3+/5   Knee extension: 4+/5 Knee extension: 4+/5   Knee flexion: 4+/5 Knee flexion: 4+/5     Special Tests:   Right Left   Posterior Drawer Test - -   Anterior Drawer Test - -   Squeeze test - -   Fink test - -   Subtalar Neutral - -   Heel walking + +   Toe walking + +   Navicular Drop - -   Windlass test + -       Joint Mobility: normal    Palpation: tender to palpation of inferiomedial calcaneus in area of medial plantar fascia insertion    Flexibility: decreased soft tissue flexibility and mobility of R foot       CMS Impairment/Limitation/Restriction for FOTO foot Survey    Therapist reviewed FOTO scores for Kellen Fraser on 3/23/2023.   FOTO documents entered into [employer] - see Media section.               TREATMENT   Treatment Time In: 9:30  Treatment Time Out: 10:00  Total Treatment time separate from Evaluation: 30 minutes    Kellen received therapeutic exercises to develop strength, ROM, and flexibility for 20 minutes including:  Gastroc/PF towel  "stretch 3x30"  PF Blue TB 2x20  Seated heel raise 10# 2x10  Inversion GTB 2x10  Eversion GTB 2x10  DF GTB 2x10  Single leg sit to stand with non-WB R LE and 2 crutches x5    Kellen participated in gait training to improve functional mobility and safety for 10  minutes, including:    non-weight bearing 2 crutch 3-point gait ambulation with CGA  - forward ambulation  - turning to R and L 190 deg  - lateral side stepping movements     Home Exercises and Patient Education Provided    Education provided:   - HEP, POC, non-weight bearing 2 crutch gait pattern    Written Home Exercises Provided: yes.  Exercises were reviewed and Kellen was able to demonstrate them prior to the end of the session.  Kellen demonstrated good  understanding of the education provided.     See EMR under Patient Instructions for exercises provided 3/23/2023.    Assessment   Kellen is a 47 y.o. female referred to outpatient Physical Therapy with a medical diagnosis of M72.2 (ICD-10-CM) - Plantar fasciitis, S93.691A (ICD-10-CM) - Rupture of plantar fascia of right foot, initial encounter, and M24.573 (ICD-10-CM) - Equinus contracture of ankle. Pt presents with signs and symptoms consistent with diagnosis include decreased range of motion of R ankle, weakness of R ankle and R LE, poor balance, gait deviations, decreased soft tissue flexibility and mobility, and decreased functional mobility tolerance. These deficits are limiting patient in full participation in ADL's and leisure activities such as weightbearing, dorsiflexion, walking, standing, driving, sitting, sit to stand, lifting, and household chores. 3 point 2 crutch gait pattern with non-WB R LE practiced today with good tolerance and performance with min cueing required and no LOB. Pt planning to have surgery in April to release plantar fascia and will be seen after procedure.     Pt prognosis is Fair.   Pt will benefit from skilled outpatient Physical Therapy to address the deficits stated " above and in the chart below, provide pt/family education, and to maximize pt's level of independence.     Plan of care discussed with patient: Yes  Pt's spiritual, cultural and educational needs considered and patient is agreeable to the plan of care and goals as stated below:     Anticipated Barriers for therapy: chronicity of condition    Medical Necessity is demonstrated by the following  History  Co-morbidities and personal factors that may impact the plan of care Co-morbidities:   difficulty sleeping, high BMI, history of cancer and prior R foot surgery, hypothyroidism      Personal Factors:   no deficits       Complexity: low   Examination  Body Structures and Functions, activity limitations and participation restrictions that may impact the plan of care Body Regions:   back  lower extremities  trunk     Body Systems:    gross symmetry  ROM  strength  gross coordinated movement  balance  gait  transfers  transitions  motor control     Participation Restrictions:   See above     Activity limitations:   Learning and applying knowledge  no deficits     General Tasks and Commands  no deficits     Communication  no deficits     Mobility  lifting and carrying objects  walking  driving (bike, car, motorcycle)     Self care  no deficits     Domestic Life  shopping  doing house work (cleaning house, washing dishes, laundry)  assisting others     Interactions/Relationships  no deficits     Life Areas  no deficits     Community and Social Life  no deficits             Complexity: low  See FOTO outcome assessment   Clinical Presentation stable and uncomplicated low   Decision Making/ Complexity Score: low      Goals:  GOALS: Short Term Goals:  4 weeks  1.Report decreased  in  pain at worse less than  <   / =  5  /10  to increase tolerance for improved functional actvities. On going  2. Pt to improve range of motion by 25% to allow for improved functional mobility to allow for improvement in IADLs. On going  3. Increased  R ankle MMT 1/2 grade  to increase tolerance for ADL and work activities.On going  4. Pt to demonstrate 3 point non weight bearing gait pattern with bilateral crutches. Ongoing  5. Pt to tolerate HEP to improve ROM and independence with ADL's. On going     Long Term Goals: 8 weeks  1.Report decreased  in  pain at worse  less than  <   / =  3  /10  to increase tolerance for improved functional actvities. On going  2.Pt to improve range of motion by 75% to allow for improved functional mobility to allow for improvement in IADLs. On going  3.Increased R ankle MMT 1 grade  to increase tolerance for ADL and work activities.On going  4. Pt will score at least 43% or less on  FOTO outcome assessment  to increase functional activities and mobility. On going  5. Pt to be Independent with HEP to improve ROM and independence with ADL's. On going  6. Pt will be able to ambulate 500 ft without increased foot pain       Plan   Plan of care Certification: 3/23/2023 to 5/23/2023.    Outpatient Physical Therapy 1 times weekly for 8 weeks to include the following interventions: Aquatic Therapy, Gait Training, Manual Therapy, Moist Heat/ Ice, Neuromuscular Re-ed, Patient Education, Therapeutic Activities, and Therapeutic Exercise. Dry needling.     Hugo Kay, PT      I CERTIFY THE NEED FOR THESE SERVICES FURNISHED UNDER THIS PLAN OF TREATMENT AND WHILE UNDER MY CARE   Physician's comments:     Physician's Signature: ___________________________________________________

## 2023-03-30 ENCOUNTER — CLINICAL SUPPORT (OUTPATIENT)
Dept: REHABILITATION | Facility: OTHER | Age: 48
End: 2023-03-30
Payer: OTHER GOVERNMENT

## 2023-03-30 DIAGNOSIS — Z74.09 IMPAIRED FUNCTIONAL MOBILITY, BALANCE, GAIT, AND ENDURANCE: ICD-10-CM

## 2023-03-30 DIAGNOSIS — R29.898 WEAKNESS OF FOOT, RIGHT: Primary | ICD-10-CM

## 2023-03-30 DIAGNOSIS — R29.898 ANKLE WEAKNESS: ICD-10-CM

## 2023-03-30 PROCEDURE — 97140 MANUAL THERAPY 1/> REGIONS: CPT

## 2023-03-30 PROCEDURE — 97116 GAIT TRAINING THERAPY: CPT

## 2023-03-30 NOTE — PROGRESS NOTES
"[facility]   Physical Therapy Treatment Note     Name: Kellen Fraser  Clinic Number: [id number]    Therapy Diagnosis:   Encounter Diagnoses   Name Primary?    Weakness of foot, right Yes    Ankle weakness     Impaired functional mobility, balance, gait, and endurance      Physician: Clemente Young DPM    Visit Date: 3/30/2023    Physician Orders: PT Eval and Treat   Medical Diagnosis from Referral:   M72.2 (ICD-10-CM) - Plantar fasciitis   S93.691A (ICD-10-CM) - Rupture of plantar fascia of right foot, initial encounter   M24.573 (ICD-10-CM) - Equinus contracture of ankle      Evaluation Date: 3/23/2023  Authorization Period Expiration: 7/14/2023  Plan of Care Expiration: 5/23/2023  Visit # / Visits authorized: 2/20 Progress Note Due: 4/23/2023  FOTO: 1/ 1      PTA Visit #: 0/5     Time In: 3:30 pm  Time Out: 4:30 pm  Total Billable Time: 30 minutes  Total Treatment Time: 55 minutes    SUBJECTIVE     Pt reports: able to perform all HEP, she has her pre-op appointment tomorrow and then meets with the surgeon next week.  She was compliant with home exercise program.  Response to previous treatment: no adverse effects  Functional change: none yet, first follow-up    Pain: 3/10  Location: right heel    OBJECTIVE     Objective Measures updated at progress report unless specified.     Treatment     Kellen received the treatments listed below:      therapeutic exercises to develop strength, ROM, and flexibility for 25 minutes including:    Recumbent bike Lv 4 x5'   Slantboard gastroc stretch 2x30"  Gastroc/PF towel stretch 3x30"  PF Blue TB 2x20  Seated heel raise 10# 2x10  Towel scrunches x3'  Arch lifts x10 , w/ RTB under big toe 2x10x3  Inversion GTB 3x10  Eversion GTB 3x10  DF GTB 3x10  Single leg sit to stand with non-WB R LE and 2 crutches 3x5    manual therapy techniques: Myofacial release and Soft tissue Mobilization were applied to the: R foor for 15 minutes, including:  R plantar " fascia and gastroc STM  PROM stretching of R plantar fascia and gastroc    Kellen participated in gait training to improve functional mobility and safety for 15  minutes, including:     non-weight bearing 2 crutch 3-point gait ambulation with CGA  - forward ambulation  - obstacle course  - turning to R and L 180 deg  - lateral side stepping movements     cold pack for 5 minutes to R foot/ankle.        Patient Education and Home Exercises     Home Exercises Provided and Patient Education Provided     Education provided:   - HEP, POC    Written Home Exercises Provided: Patient instructed to cont prior HEP. Exercises were reviewed and Kellen was able to demonstrate them prior to the end of the session.  Kellen demonstrated good  understanding of the education provided. See EMR under Patient Instructions for exercises provided during therapy sessions    ASSESSMENT     Kellen presents today for her first follow up since initial evaluation. Reviewed HEP with pt demo good performance and min cueing required. Still with weakness throughout ankle musculature. Good performance and no LOB noted with non weight bearing 3 point crutch gait practice. Pt to have surgery in coming weeks and will be re-evaluated afterwards.     Kellen Is progressing well towards her goals.   Pt prognosis is Good.     Pt will continue to benefit from skilled outpatient physical therapy to address the deficits listed in the problem list box on initial evaluation, provide pt/family education and to maximize pt's level of independence in the home and community environment.     Pt's spiritual, cultural and educational needs considered and pt agreeable to plan of care and goals.     Anticipated barriers to physical therapy: chronicity of condition    Goals:  GOALS: Short Term Goals:  4 weeks  1.Report decreased  in  pain at worse less than  <   / =  5  /10  to increase tolerance for improved functional actvities. On going  2. Pt to improve range of motion  by 25% to allow for improved functional mobility to allow for improvement in IADLs. On going  3. Increased R ankle MMT 1/2 grade  to increase tolerance for ADL and work activities.On going  4. Pt to demonstrate 3 point non weight bearing gait pattern with bilateral crutches. Ongoing  5. Pt to tolerate HEP to improve ROM and independence with ADL's. On going     Long Term Goals: 8 weeks  1.Report decreased  in  pain at worse  less than  <   / =  3  /10  to increase tolerance for improved functional actvities. On going  2.Pt to improve range of motion by 75% to allow for improved functional mobility to allow for improvement in IADLs. On going  3.Increased R ankle MMT 1 grade  to increase tolerance for ADL and work activities.On going  4. Pt will score at least 43% or less on  FOTO outcome assessment  to increase functional activities and mobility. On going  5. Pt to be Independent with HEP to improve ROM and independence with ADL's. On going  6. Pt will be able to ambulate 500 ft without increased foot pain       PLAN     Plan of care Certification: 3/23/2023 to 5/23/2023.    Continue Treatment per established POC    Hugo Kay, PT   3/30/2023

## 2023-03-31 ENCOUNTER — OFFICE VISIT (OUTPATIENT)
Dept: INTERNAL MEDICINE | Facility: CLINIC | Age: 48
End: 2023-03-31
Payer: OTHER GOVERNMENT

## 2023-03-31 VITALS
HEIGHT: 64 IN | DIASTOLIC BLOOD PRESSURE: 80 MMHG | OXYGEN SATURATION: 98 % | WEIGHT: 246.06 LBS | SYSTOLIC BLOOD PRESSURE: 126 MMHG | BODY MASS INDEX: 42.01 KG/M2 | HEART RATE: 96 BPM

## 2023-03-31 DIAGNOSIS — Z01.818 PRE-OP EVALUATION: Primary | ICD-10-CM

## 2023-03-31 DIAGNOSIS — E89.0 POST-SURGICAL HYPOTHYROIDISM: ICD-10-CM

## 2023-03-31 PROCEDURE — 99214 OFFICE O/P EST MOD 30 MIN: CPT | Mod: PBBFAC

## 2023-03-31 PROCEDURE — 99213 PR OFFICE/OUTPT VISIT, EST, LEVL III, 20-29 MIN: ICD-10-PCS | Mod: S$PBB,,,

## 2023-03-31 PROCEDURE — 99213 OFFICE O/P EST LOW 20 MIN: CPT | Mod: S$PBB,,,

## 2023-03-31 PROCEDURE — 99999 PR PBB SHADOW E&M-EST. PATIENT-LVL IV: CPT | Mod: PBBFAC,,,

## 2023-03-31 PROCEDURE — 99999 PR PBB SHADOW E&M-EST. PATIENT-LVL IV: ICD-10-PCS | Mod: PBBFAC,,,

## 2023-03-31 NOTE — PROGRESS NOTES
Subjective     Chief Complaint: Pre-OP Evaluation     History of Present Illness:  Ms. Kellen Fraser is a 47 y.o. female here for pre-op evaluation. She has a significant past medical hx of post-surgical hypothyroidism taking levothyroxine. She is also being evaluated by allergy regarding multiple allergies. Her upcoming procedure is with podiatry, Dr. Young regarding her plantar fascia of the R foot. This will be done laparoscopically per pt. Previous surgical hx includes, thyroid removal(2004), CCY, and foot surgery(2007). She denies any complications regarding anesthesia or the post-op period . She denies recent illness, cardiac hx including HTN, HLD, or ischemic heart diease. She denies pulmonary symptoms, and had a PFT performed years ago which was found to be normal.     Review of Systems   Constitutional:  Negative for chills, fever and malaise/fatigue.   HENT:  Negative for congestion and sore throat.    Respiratory:  Negative for cough, shortness of breath and wheezing.    Cardiovascular:  Negative for chest pain, palpitations and leg swelling.   Gastrointestinal:  Negative for abdominal pain, constipation, diarrhea, heartburn, nausea and vomiting.   Genitourinary:  Negative for dysuria and urgency.   Musculoskeletal:  Positive for joint pain and myalgias.   Neurological:  Negative for dizziness and headaches.   Endo/Heme/Allergies:  Positive for environmental allergies. Does not bruise/bleed easily.   Psychiatric/Behavioral:  Negative for depression. The patient is not nervous/anxious.      PAST HISTORY:     Past Medical History:   Diagnosis Date    Allergy     BRCA negative 11/2014    Cervical disc disease     C5-C6, impacting thecal sac    Gallbladder sludge     Hypothyroidism     Thyroid cancer     Uterine fibroid        Past Surgical History:   Procedure Laterality Date    BREAST BIOPSY Right     exc bx    CHOLECYSTECTOMY  2012    EPIDURAL STEROID INJECTION N/A 9/10/2019    Procedure: Injection,  Steroid, Epidural CERVICAL/THORACIC C7-T1 IL JONATHAN;  Surgeon: Rachele Cody MD;  Location: [facility];  Service: Pain Management;  Laterality: N/A;  NEEDS CONSENT    FOOT SURGERY Right     THYROIDECTOMY  2004       Family History   Problem Relation Age of Onset    No Known Problems Mother     Hypertension Father     Diabetes Father     Thyroid cancer Sister     Hodgkin's lymphoma Sister     Cancer Sister         Hodgkin, thyroid, Breast, melanomas    Breast cancer Sister     Leukemia Brother     Cancer Brother         Acute leukemia    Hypertension Brother     Diabetes Brother     Cancer Maternal Grandmother         Breast    Diabetes Maternal Grandmother     Breast cancer Maternal Grandmother 70    Heart disease Maternal Grandfather     Cancer Paternal Grandfather         Lymphoma    Ovarian cancer Neg Hx     Melanoma Neg Hx        Social History     Tobacco Use    Smoking status: Never    Smokeless tobacco: Never   Substance Use Topics    Alcohol use: Yes     Alcohol/week: 2.0 standard drinks     Types: 2 Cans of beer per week     Comment: socially    Drug use: No       MEDICATIONS & ALLERGIES:     Current Outpatient Medications on File Prior to Visit   Medication Sig    ALBUTEROL INHL     butalbital-acetaminophen-caffeine -40 mg (FIORICET, ESGIC) -40 mg per tablet Take 1 tablet by mouth every 6 (six) hours as needed for Pain.    levothyroxine (SYNTHROID) 200 MCG tablet Take 1 tablet (200 mcg total) by mouth once daily.    LIDOcaine HCL 2% (XYLOCAINE) 2 % jelly Apply topically as needed. Apply topically once nightly to affected part of foot/feet.    LIDOcaine HCL 2% (XYLOCAINE) 2 % jelly Apply topically as needed. Apply topically once nightly to affected part of foot/feet.    LIDOcaine HCL 2% (XYLOCAINE) 2 % jelly Apply topically as needed. Apply topically once nightly to affected part of foot/feet.    meloxicam (MOBIC) 15 MG tablet Take 1 tablet (15 mg total) by mouth once daily.     "methocarbamoL (ROBAXIN) 500 MG Tab Take 1 tablet (500 mg total) by mouth 4 (four) times daily.     Current Facility-Administered Medications on File Prior to Visit   Medication    0.9%  NaCl infusion    aluminum & magnesium hydroxide-simethicone 400-400-40 mg/5 mL suspension 30 mL       Review of patient's allergies indicates:   Allergen Reactions    Morphine Shortness Of Breath    Shellfish containing products Hives and Itching    Dilaudid [hydromorphone (bulk)] Hives    Dilaudid [hydromorphone] Hives    Erythromycin Hives    Influenza virus vaccines Hives    Meperidine Hives     Other reaction(s): Rash, Urticaria, Rash, Urticaria    Penicillins Hives     Other reaction(s): Other: hives    Reglan [metoclopramide] Nausea And Vomiting    Toradol [ketorolac] Nausea And Vomiting    Oyster shell Diarrhea and Nausea And Vomiting    Sulfamethoxazole-trimethoprim Nausea And Vomiting     Other reaction(s): Unknown       OBJECTIVE:     Vital Signs:  Vitals:    03/31/23 1442   BP: 126/80   BP Location: Left arm   Patient Position: Sitting   BP Method: Large (Manual)   Pulse: 96   SpO2: 98%   Weight: 111.6 kg (246 lb 0.5 oz)   Height: 5' 4" (1.626 m)       Body mass index is 42.23 kg/m².     Physical Exam  Constitutional:       General: She is not in acute distress.     Appearance: Normal appearance. She is obese. She is not ill-appearing, toxic-appearing or diaphoretic.   HENT:      Head: Normocephalic and atraumatic.   Cardiovascular:      Rate and Rhythm: Normal rate and regular rhythm.      Pulses: Normal pulses.      Heart sounds: Normal heart sounds.   Pulmonary:      Effort: Pulmonary effort is normal. No respiratory distress.      Breath sounds: Normal breath sounds. No wheezing.   Abdominal:      General: Abdomen is flat. There is no distension.      Palpations: Abdomen is soft.      Tenderness: There is no abdominal tenderness.   Musculoskeletal:         General: Tenderness (R plantar foot) and signs of injury (R " plantar foot) present.        Feet:    Skin:     General: Skin is warm and dry.   Neurological:      General: No focal deficit present.      Mental Status: She is alert and oriented to person, place, and time.   Psychiatric:         Mood and Affect: Mood normal.         Behavior: Behavior normal.     Health Maintenance Due   Topic Date Due    COVID-19 Vaccine (1) Never done    Hemoglobin A1c (Diabetic Prevention Screening)  Never done    Colorectal Cancer Screening  Never done    Mammogram  12/23/2022         ASSESSMENT & PLAN:   Ms. Kellen Fraser is a 47 y.o. female here for pre-op evaluation for podiatric procedure         her estimated risk for an adverse cardiac outcome (myocardial infarction, pulmonary edema, ventricular fibrillation, cardiac arrest, or complete heart block) with the proposed surgery based on functional capacity at the Revised Cardiac Risk Index [RCRI] - Mika Criteria is RCRI = 0; 3.9% risk.    Cardiovascular Risk Assessment:  No active cardiac problems (such as unstable angina, decompensated heart failure, significant uncontrolled arrhythmias or severe valvular disease).  Functional Status: able to walk 2+ blocks without chest pain/palpitations or SOB. Can perform daily tasks of living such as shopping and maneuvering around the house only limited by R foot foot/pain.  (> 4 METS)  Her revised cardiac risk index is 0.     1 pt Each: Ischemic Heart Disease, Cerebrovascular Disease,                     CHF, DM, Creatinine > 2           Anticoagulation:  not on anticoagulation      Recommendation:  - Patient is medically optimized from an internal medicine standpoint   - Allergy list reviewed with patient. Encouraged pt to discuss any changes with surgeon and anesthesia   - RCRI score of 0    Low risk procedure = Endoscopic procedures, superficial, cataracts, Breast, Ambulatory       Discussed with Dr. Leigh - staff attestation to follow      Sascha Ybarra DO, MSB   Internal Medicine,  PGY-I  [facility]  [address] [place] [address]  [phone]

## 2023-04-05 ENCOUNTER — OFFICE VISIT (OUTPATIENT)
Dept: PODIATRY | Facility: CLINIC | Age: 48
End: 2023-04-05
Payer: OTHER GOVERNMENT

## 2023-04-05 DIAGNOSIS — M79.671 FOOT PAIN, RIGHT: ICD-10-CM

## 2023-04-05 DIAGNOSIS — G89.29 CHRONIC HEEL PAIN, RIGHT: ICD-10-CM

## 2023-04-05 DIAGNOSIS — M72.2 PLANTAR FASCIITIS: Primary | ICD-10-CM

## 2023-04-05 DIAGNOSIS — M79.671 CHRONIC HEEL PAIN, RIGHT: ICD-10-CM

## 2023-04-05 DIAGNOSIS — S93.691A RUPTURE OF PLANTAR FASCIA OF RIGHT FOOT, INITIAL ENCOUNTER: ICD-10-CM

## 2023-04-05 PROCEDURE — 99999 PR PBB SHADOW E&M-EST. PATIENT-LVL III: CPT | Mod: PBBFAC,,, | Performed by: PODIATRIST

## 2023-04-05 PROCEDURE — 99999 PR PBB SHADOW E&M-EST. PATIENT-LVL III: ICD-10-PCS | Mod: PBBFAC,,, | Performed by: PODIATRIST

## 2023-04-05 PROCEDURE — 99213 OFFICE O/P EST LOW 20 MIN: CPT | Mod: PBBFAC,PN | Performed by: PODIATRIST

## 2023-04-05 PROCEDURE — 99215 OFFICE O/P EST HI 40 MIN: CPT | Mod: S$PBB,,, | Performed by: PODIATRIST

## 2023-04-05 PROCEDURE — 99215 PR OFFICE/OUTPT VISIT, EST, LEVL V, 40-54 MIN: ICD-10-PCS | Mod: S$PBB,,, | Performed by: PODIATRIST

## 2023-04-05 NOTE — PROGRESS NOTES
Subjective:      Patient ID: Kellen Fraser is a 47 y.o. female.    Chief Complaint: Foot Problem (Sign consent/set surgery date)    Sharp deep pain in the bottom right heel.  Gradual onset following increased activity greater than 18 months ago.  Minimal improvement since.  She was beginning to recover from the plantar fasciitis and suffered a rupture of the medial origin plantar fascia right foot.  This was confirmed on MRI.  Due to family Health Emergency she is had to discontinue her treatment for the past several months.  She has continued sharp deep focal pain in the same area and would like surgical treatment to try to alleviate.  She wears fracture boot all the time.  It is unable to ambulate in regular shoe gear.  Walks with crutches for stability.    Special perioperative attention: Allergic to iodine Betadine products, allergic to morphine Toradol meperidine Lortab all with itching/hives of varied severity.  She believes she has taken Percocet before without difficulty.  She is aware it is derived from the same molecules    Review of Systems   Constitutional: Negative for chills, diaphoresis, fever, malaise/fatigue and night sweats.   Cardiovascular:  Negative for claudication, cyanosis, leg swelling and syncope.   Skin:  Negative for color change, dry skin, nail changes, rash, suspicious lesions and unusual hair distribution.   Musculoskeletal:  Negative for falls, joint pain, joint swelling, muscle cramps, muscle weakness and stiffness.   Gastrointestinal:  Negative for constipation, diarrhea, nausea and vomiting.   Neurological:  Negative for brief paralysis, disturbances in coordination, focal weakness, numbness, paresthesias, sensory change and tremors.         Objective:      Physical Exam  Constitutional:       General: She is not in acute distress.     Appearance: She is well-developed. She is not diaphoretic.   Cardiovascular:      Pulses:           Popliteal pulses are 2+ on the right side and  2+ on the left side.        Dorsalis pedis pulses are 2+ on the right side and 2+ on the left side.        Posterior tibial pulses are 2+ on the right side and 2+ on the left side.      Comments: Capillary refill 3 seconds all toes/distal feet, all toes/both feet warm to touch.      Negative lymphadenopathy bilateral popliteal fossa and tarsal tunnel.      Negavie lower extremity edema bilateral.    Musculoskeletal:      Right ankle: No swelling, deformity, ecchymosis or lacerations. Normal range of motion. Normal pulse.      Right Achilles Tendon: Normal. No defects. Fink's test negative.      Comments: Sharp deep pain to palpation inferior heel right at medial calcaneal tubercle without ecchymosis, erythema, edema, or cardinal signs infection, and no signs of trauma.    Ankle dorsiflexion approximates 10° bilateral with moderate increase with knee flexion bilateral.    Otherwise, Normal angle, base, station of gait. All ten toes without clubbing, cyanosis, or signs of ischemia.  No pain to palpation bilateral lower extremities.  Range of motion, stability, muscle strength, and muscle tone normal bilateral feet and legs.    Lymphadenopathy:      Lower Body: No right inguinal adenopathy. No left inguinal adenopathy.      Comments: Negative lymphadenopathy bilateral popliteal fossa and tarsal tunnel.    Negative lymphangitic streaking bilateral feet/ankles/legs.   Skin:     General: Skin is warm and dry.      Capillary Refill: Capillary refill takes 2 to 3 seconds.      Coloration: Skin is not pale.      Findings: No abrasion, bruising, burn, ecchymosis, erythema, laceration, lesion or rash.      Nails: There is no clubbing.      Comments:   Skin is normal age and health appropriate color, turgor, texture, and temperature bilateral lower extremities without ulceration, hyperpigmentation, discoloration, masses nodules or cords palpated.  No ecchymosis, erythema, edema, or cardinal signs of infection bilateral  lower extremities.     Neurological:      Mental Status: She is alert and oriented to person, place, and time.      Sensory: No sensory deficit.      Motor: No tremor, atrophy or abnormal muscle tone.      Gait: Gait normal.      Deep Tendon Reflexes:      Reflex Scores:       Patellar reflexes are 2+ on the right side and 2+ on the left side.       Achilles reflexes are 2+ on the right side and 2+ on the left side.     Comments: Negative tinel sign to percussion sural, superficial peroneal, deep peroneal, saphenous, and posterior tibial nerves right and left ankles and feet.    Negative allodynia both feet   Psychiatric:         Behavior: Behavior is cooperative.             Assessment:       No diagnosis found.        Plan:       There are no diagnoses linked to this encounter.    I counseled the patient on her conditions, their implications and medical management.        Consult Physical therapy to assess ability to safely non weightbear right one-week postop.      Thorough discussion of the endoscopic plantar fasciotomy procedure and the open plantar fasciotomy procedure.      Counseled on conservative treatments including custom orthotics, shoe and activity change, physical therapy, antiinflammatory, and pain medication, and sometimes injections for symptomatic relief.    Counseled on surgical correction,-endoscopic plantar fasciotomy right foot-open procedure if necessary right foot.  risks and benefits, including, but not limited to recurrence, infection, pain, scar, poor cosmesis, loss of function, arthritis, healing in poor position, new or different ulceration or pre ulcerative callus, nerve pain, nerve damage, numbness, syndromes (RSD), need for future surgical procedures, and or long term use of orthotics, blood clots of leg, lung, heart, brain, death.    Consider carefully, appoint with pcp for surgical clearance, follow here prn as symptoms dictate for consent, post op Rx, and scheduling.    Target  surgery date 04/26/23.        No follow-ups on file.

## 2023-04-06 ENCOUNTER — PATIENT MESSAGE (OUTPATIENT)
Dept: PODIATRY | Facility: CLINIC | Age: 48
End: 2023-04-06

## 2023-04-06 RX ORDER — SODIUM CHLORIDE 0.9 % (FLUSH) 0.9 %
3 SYRINGE (ML) INJECTION EVERY 6 HOURS PRN
Status: SHIPPED | OUTPATIENT
Start: 2023-04-26

## 2023-04-12 ENCOUNTER — ANESTHESIA EVENT (OUTPATIENT)
Dept: SURGERY | Facility: OTHER | Age: 48
End: 2023-04-12
Payer: OTHER GOVERNMENT

## 2023-04-12 ENCOUNTER — HOSPITAL ENCOUNTER (OUTPATIENT)
Dept: PREADMISSION TESTING | Facility: OTHER | Age: 48
Discharge: HOME | End: 2023-04-12
Attending: PODIATRIST
Payer: OTHER GOVERNMENT

## 2023-04-12 VITALS
OXYGEN SATURATION: 98 % | WEIGHT: 245 LBS | DIASTOLIC BLOOD PRESSURE: 64 MMHG | SYSTOLIC BLOOD PRESSURE: 127 MMHG | RESPIRATION RATE: 16 BRPM | TEMPERATURE: 98 F | HEIGHT: 64 IN | BODY MASS INDEX: 41.83 KG/M2 | HEART RATE: 65 BPM

## 2023-04-12 RX ORDER — LIDOCAINE HYDROCHLORIDE 10 MG/ML
0.5 INJECTION, SOLUTION EPIDURAL; INFILTRATION; INTRACAUDAL; PERINEURAL ONCE
Status: CANCELLED | OUTPATIENT
Start: 2023-04-12 | End: 2023-04-12

## 2023-04-12 RX ORDER — ACETAMINOPHEN 500 MG
1000 TABLET ORAL
Status: CANCELLED | OUTPATIENT
Start: 2023-04-12 | End: 2023-04-12

## 2023-04-12 RX ORDER — PREGABALIN 75 MG/1
75 CAPSULE ORAL ONCE
Status: CANCELLED | OUTPATIENT
Start: 2023-04-12 | End: 2023-04-12

## 2023-04-12 RX ORDER — SODIUM CHLORIDE, SODIUM LACTATE, POTASSIUM CHLORIDE, CALCIUM CHLORIDE 600; 310; 30; 20 MG/100ML; MG/100ML; MG/100ML; MG/100ML
INJECTION, SOLUTION INTRAVENOUS CONTINUOUS
Status: CANCELLED | OUTPATIENT
Start: 2023-04-12

## 2023-04-12 NOTE — ANESTHESIA PREPROCEDURE EVALUATION
04/12/2023  Kellen Fraser is a 47 y.o., female.      Pre-op Assessment    I have reviewed the Patient Summary Reports.     I have reviewed the Nursing Notes. I have reviewed the NPO Status.   I have reviewed the Medications.     Review of Systems  Anesthesia Hx:  Next day nausea after pain injection epidural Denies Family Hx of Anesthesia complications.   Denies Personal Hx of Anesthesia complications.   Social:  Non-Smoker    Hematology/Oncology:  Hematology Normal   Oncology Normal   Oncology Comments: Thyroid CA     EENT/Dental:EENT/Dental Normal   Cardiovascular:  Cardiovascular Normal     Pulmonary:  Pulmonary Normal    Renal/:  Renal/ Normal     Hepatic/GI:  Hepatic/GI Normal    Musculoskeletal:  Musculoskeletal Normal    Neurological:  Neurology Normal    Endocrine:   Hypothyroidism  Morbid Obesity / BMI > 40  Dermatological:  Skin Normal    Psych:  Psychiatric Normal           Physical Exam  General: Well nourished and Alert    Airway:  Mallampati: II   Mouth Opening: Normal  Tongue: Normal    Dental:  Intact        Anesthesia Plan  Type of Anesthesia, risks & benefits discussed:    Anesthesia Type: MAC  Intra-op Monitoring Plan: Standard ASA Monitors  Post Op Pain Control Plan: multimodal analgesia  Induction:  IV  Informed Consent: Informed consent signed with the Patient and all parties understand the risks and agree with anesthesia plan.  All questions answered.   ASA Score: 3  Anesthesia Plan Notes: IV propofol for local by surgeon    Ready For Surgery From Anesthesia Perspective.     .

## 2023-04-12 NOTE — DISCHARGE INSTRUCTIONS
Information to Prepare you for your Surgery    PRE-ADMIT TESTING -  [phone]    [address] [facility]          Your surgery has been scheduled at [facility]. We are pleased to have the opportunity to serve you. For Further Information please call [phone].    On the day of surgery please report to the Information Desk on the 1st floor.    CONTACT YOUR PHYSICIAN'S OFFICE THE DAY PRIOR TO YOUR SURGERY TO OBTAIN YOUR ARRIVAL TIME.     The evening before surgery do not eat anything after 9 p.m. ( this includes hard candy, chewing gum and mints).  You may only have GATORADE, POWERADE AND WATER  from 9 p.m. until you leave your home.   DO NOT DRINK ANY LIQUIDS ON THE WAY TO THE HOSPITAL.      Why does your anesthesiologist allow you to drink Gatorade/Powerade before surgery?  Gatorade/Powerade helps to increase your comfort before surgery and to decrease your nausea after surgery. The carbohydrates in Gatorade/Powerade help reduce your body's stress response to surgery.  If you are a diabetic-drink only water prior to surgery.       Patients may have 2 visitors pre and post procedure. Only 2 visitors will be allowed in the Surgical building with the patient. No one under the age of 12 will be allowed into the facility.    SPECIAL MEDICATION INSTRUCTIONS: TAKE medications checked off by the Anesthesiologist on your Medication List.    Angiogram Patients: Take medications as instructed by your physician, including aspirin.     Surgery Patients:    If you take ASPIRIN - Your PHYSICIAN/SURGEON will need to inform you IF/OR when you need to stop taking aspirin prior to your surgery.     The week prior to surgery do not ot take any medications containing IBUPROFEN or NSAIDS ( Advil, Motrin, Goodys, BC, Aleve, Naproxen etc) If you are not sure if you should take a medicine please call your surgeon's office.  Ok to take Tylenol    Do Not Wear any make-up  (especially eye make-up) to surgery. Please remove any false eyelashes or eyelash extensions. If you arrive the day of surgery with makeup/eyelashes on you will be required to remove prior to surgery. (There is a risk of corneal abrasions if eye makeup/eyelash extensions are not removed)      Leave all valuables at home.   Do Not wear any jewelry or watches, including any metal in body piercings. Jewelry must be removed prior to coming to the hospital.  There is a possibility that rings that are unable to be removed may be cut off if they are on the surgical extremity.    Please remove all hair extensions, wigs, clips and any other metal accessories/ ornaments from your hair.  These items may pose a flammable/fire risk in Surgery and must be removed.    Do not shave your surgical area at least 5 days prior to your surgery. The surgical prep will be performed at the hospital according to Infection Control regulations.    Contact Lens must be removed before surgery. Either do not wear the contact lens or bring a case and solution for storage.  Please bring a container for eyeglasses or dentures as required.  Bring any paperwork your physician has provided, such as consent forms,  history and physicals, doctor's orders, etc.   Bring comfortable clothes that are loose fitting to wear upon discharge. Take into consideration the type of surgery being performed.  Maintain your diet as advised per your physician the day prior to surgery.      Adequate rest the night before surgery is advised.   Park in the Parking lot behind the hospital or in the [facility] across the street from the parking lot. Parking is complimentary.  If you will be discharged the same day as your procedure, please arrange for a responsible adult to drive you home or to accompany you if traveling by taxi.   YOU WILL NOT BE PERMITTED TO DRIVE OR TO LEAVE THE HOSPITAL ALONE AFTER SURGERY.   If you are being discharged the same day, it is  strongly recommended that you arrange for someone to remain with you for the first 24 hrs following your surgery.    The Surgeon will speak to your family/visitor after your surgery regarding the outcome of your surgery and post op care.  The Surgeon may speak to you after your surgery, but there is a possibility you may not remember the details.  Please check with your family members regarding the conversation with the Surgeon.    We strongly recommend whoever is bringing you home be present for discharge instructions.  This will ensure a thorough understanding for your post op home care.    ALL CHILDREN MUST ALWAYS BE ACCOMPANIED BY AN ADULT.    Visitors-Refer to current Visitor policy handouts.    Thank you for your cooperation.  The Staff of [facility].            Bathing Instructions with Hibiclens    Shower the evening before and morning of your procedure with Chlorhexidine (Hibiclens)  do not use Chlorhexidine on your face or genitals. Do not get in your eyes.  Wash your face with water and your regular face wash/soap  Use your regular shampoo  Apply Chlorhexidine (Hibiclens) directly on your skin or on a wet washcloth and wash gently. When showering: Move away from the shower stream when applying Chlorhexidine (Hibiclens) to avoid rinsing off too soon.  Rinse thoroughly with warm water  Do not dilute Chlorhexidine (Hibiclens)   Dry off as usual, do not use any deodorant, powder, body lotions, perfume, after shave or cologne.

## 2023-04-18 ENCOUNTER — PATIENT MESSAGE (OUTPATIENT)
Dept: SURGERY | Facility: OTHER | Age: 48
End: 2023-04-18

## 2023-04-19 ENCOUNTER — ANESTHESIA (OUTPATIENT)
Dept: SURGERY | Facility: OTHER | Age: 48
End: 2023-04-19
Payer: OTHER GOVERNMENT

## 2023-04-19 ENCOUNTER — PATIENT MESSAGE (OUTPATIENT)
Dept: SURGERY | Facility: OTHER | Age: 48
End: 2023-04-19

## 2023-04-19 ENCOUNTER — TELEPHONE (OUTPATIENT)
Dept: PODIATRY | Facility: CLINIC | Age: 48
End: 2023-04-19

## 2023-04-19 ENCOUNTER — HOSPITAL ENCOUNTER (OUTPATIENT)
Facility: OTHER | Age: 48
Discharge: HOME | End: 2023-04-19
Attending: PODIATRIST | Admitting: PODIATRIST
Payer: OTHER GOVERNMENT

## 2023-04-19 DIAGNOSIS — M72.2 PLANTAR FASCIITIS: ICD-10-CM

## 2023-04-19 DIAGNOSIS — M79.671 FOOT PAIN, RIGHT: ICD-10-CM

## 2023-04-19 DIAGNOSIS — S93.691A RUPTURE OF PLANTAR FASCIA OF RIGHT FOOT, INITIAL ENCOUNTER: ICD-10-CM

## 2023-04-19 LAB
B-HCG UR QL: NEGATIVE
CTP QC/QA: YES

## 2023-04-19 PROCEDURE — 25000003 PHARM REV CODE 250: Performed by: NURSE ANESTHETIST, CERTIFIED REGISTERED

## 2023-04-19 PROCEDURE — 63600175 PHARM REV CODE 636 W HCPCS: Performed by: NURSE ANESTHETIST, CERTIFIED REGISTERED

## 2023-04-19 PROCEDURE — 63600175 PHARM REV CODE 636 W HCPCS: Performed by: ANESTHESIOLOGY

## 2023-04-19 PROCEDURE — 36000706: Performed by: PODIATRIST

## 2023-04-19 PROCEDURE — 81025 URINE PREGNANCY TEST: CPT | Performed by: ANESTHESIOLOGY

## 2023-04-19 PROCEDURE — 01464 ANES ARTHRS PX ANKLE&/FOOT: CPT | Performed by: PODIATRIST

## 2023-04-19 PROCEDURE — 71000015 HC POSTOP RECOV 1ST HR: Performed by: PODIATRIST

## 2023-04-19 PROCEDURE — 37000008 HC ANESTHESIA 1ST 15 MINUTES: Performed by: PODIATRIST

## 2023-04-19 PROCEDURE — 25000003 PHARM REV CODE 250: Performed by: ANESTHESIOLOGY

## 2023-04-19 PROCEDURE — 36000707: Performed by: PODIATRIST

## 2023-04-19 PROCEDURE — 71000016 HC POSTOP RECOV ADDL HR: Performed by: PODIATRIST

## 2023-04-19 PROCEDURE — 29893 PR ANKLE SCOPE,PLANTAR FASCIOTOMY: ICD-10-PCS | Mod: RT,,, | Performed by: PODIATRIST

## 2023-04-19 PROCEDURE — 29893 ENDOSCOPIC PLANTR FASCIOTOMY: CPT | Mod: RT,,, | Performed by: PODIATRIST

## 2023-04-19 PROCEDURE — 25000003 PHARM REV CODE 250: Performed by: PODIATRIST

## 2023-04-19 PROCEDURE — 37000009 HC ANESTHESIA EA ADD 15 MINS: Performed by: PODIATRIST

## 2023-04-19 PROCEDURE — 27201423 OPTIME MED/SURG SUP & DEVICES STERILE SUPPLY: Performed by: PODIATRIST

## 2023-04-19 RX ORDER — LIDOCAINE HYDROCHLORIDE 10 MG/ML
INJECTION, SOLUTION EPIDURAL; INFILTRATION; INTRACAUDAL; PERINEURAL
Status: DISCONTINUED | OUTPATIENT
Start: 2023-04-19 | End: 2023-04-19 | Stop reason: HOSPADM

## 2023-04-19 RX ORDER — LIDOCAINE HYDROCHLORIDE 10 MG/ML
0.5 INJECTION, SOLUTION EPIDURAL; INFILTRATION; INTRACAUDAL; PERINEURAL ONCE
Status: DISCONTINUED | OUTPATIENT
Start: 2023-04-19 | End: 2023-04-19 | Stop reason: HOSPADM

## 2023-04-19 RX ORDER — HYDROMORPHONE HYDROCHLORIDE 2 MG/ML
0.4 INJECTION, SOLUTION INTRAMUSCULAR; INTRAVENOUS; SUBCUTANEOUS EVERY 5 MIN PRN
Status: CANCELLED | OUTPATIENT
Start: 2023-04-19

## 2023-04-19 RX ORDER — CLINDAMYCIN PHOSPHATE 900 MG/50ML
INJECTION, SOLUTION INTRAVENOUS
Status: DISCONTINUED | OUTPATIENT
Start: 2023-04-19 | End: 2023-04-19

## 2023-04-19 RX ORDER — PROCHLORPERAZINE EDISYLATE 5 MG/ML
5 INJECTION INTRAMUSCULAR; INTRAVENOUS EVERY 30 MIN PRN
Status: DISCONTINUED | OUTPATIENT
Start: 2023-04-19 | End: 2023-04-19 | Stop reason: HOSPADM

## 2023-04-19 RX ORDER — ONDANSETRON 2 MG/ML
INJECTION INTRAMUSCULAR; INTRAVENOUS
Status: DISCONTINUED | OUTPATIENT
Start: 2023-04-19 | End: 2023-04-19

## 2023-04-19 RX ORDER — MEPERIDINE HYDROCHLORIDE 25 MG/ML
12.5 INJECTION INTRAMUSCULAR; INTRAVENOUS; SUBCUTANEOUS ONCE AS NEEDED
Status: CANCELLED | OUTPATIENT
Start: 2023-04-19 | End: 2023-04-20

## 2023-04-19 RX ORDER — LIDOCAINE HYDROCHLORIDE 20 MG/ML
INJECTION INTRAVENOUS
Status: DISCONTINUED | OUTPATIENT
Start: 2023-04-19 | End: 2023-04-19

## 2023-04-19 RX ORDER — SODIUM CHLORIDE, SODIUM LACTATE, POTASSIUM CHLORIDE, CALCIUM CHLORIDE 600; 310; 30; 20 MG/100ML; MG/100ML; MG/100ML; MG/100ML
INJECTION, SOLUTION INTRAVENOUS CONTINUOUS
Status: DISCONTINUED | OUTPATIENT
Start: 2023-04-19 | End: 2023-04-19 | Stop reason: HOSPADM

## 2023-04-19 RX ORDER — PROPOFOL 10 MG/ML
VIAL (ML) INTRAVENOUS CONTINUOUS PRN
Status: DISCONTINUED | OUTPATIENT
Start: 2023-04-19 | End: 2023-04-19

## 2023-04-19 RX ORDER — PROPOFOL 10 MG/ML
VIAL (ML) INTRAVENOUS
Status: DISCONTINUED | OUTPATIENT
Start: 2023-04-19 | End: 2023-04-19

## 2023-04-19 RX ORDER — PREGABALIN 75 MG/1
75 CAPSULE ORAL ONCE
Status: COMPLETED | OUTPATIENT
Start: 2023-04-19 | End: 2023-04-19

## 2023-04-19 RX ORDER — OXYCODONE HYDROCHLORIDE 5 MG/1
5 TABLET ORAL
Status: DISCONTINUED | OUTPATIENT
Start: 2023-04-19 | End: 2023-04-19 | Stop reason: HOSPADM

## 2023-04-19 RX ORDER — FENTANYL CITRATE 50 UG/ML
INJECTION, SOLUTION INTRAMUSCULAR; INTRAVENOUS
Status: DISCONTINUED | OUTPATIENT
Start: 2023-04-19 | End: 2023-04-19

## 2023-04-19 RX ORDER — MIDAZOLAM HYDROCHLORIDE 1 MG/ML
INJECTION INTRAMUSCULAR; INTRAVENOUS
Status: DISCONTINUED | OUTPATIENT
Start: 2023-04-19 | End: 2023-04-19

## 2023-04-19 RX ORDER — SODIUM CHLORIDE 0.9 % (FLUSH) 0.9 %
3 SYRINGE (ML) INJECTION
Status: DISCONTINUED | OUTPATIENT
Start: 2023-04-19 | End: 2023-04-19 | Stop reason: HOSPADM

## 2023-04-19 RX ORDER — IBUPROFEN 800 MG/1
800 TABLET ORAL 3 TIMES DAILY
Qty: 90 TABLET | Refills: 0 | Status: SHIPPED | OUTPATIENT
Start: 2023-04-19 | End: 2023-04-19

## 2023-04-19 RX ORDER — ACETAMINOPHEN 500 MG
1000 TABLET ORAL
Status: COMPLETED | OUTPATIENT
Start: 2023-04-19 | End: 2023-04-19

## 2023-04-19 RX ORDER — BUPIVACAINE HYDROCHLORIDE 5 MG/ML
INJECTION, SOLUTION EPIDURAL; INTRACAUDAL
Status: DISCONTINUED | OUTPATIENT
Start: 2023-04-19 | End: 2023-04-19 | Stop reason: HOSPADM

## 2023-04-19 RX ORDER — IBUPROFEN 800 MG/1
800 TABLET ORAL 3 TIMES DAILY
Qty: 90 TABLET | Refills: 0 | Status: SHIPPED | OUTPATIENT
Start: 2023-04-19

## 2023-04-19 RX ORDER — PHENYLEPHRINE HYDROCHLORIDE 10 MG/ML
INJECTION INTRAVENOUS
Status: DISCONTINUED | OUTPATIENT
Start: 2023-04-19 | End: 2023-04-19

## 2023-04-19 RX ADMIN — PHENYLEPHRINE HYDROCHLORIDE 200 MCG: 10 INJECTION INTRAVENOUS at 01:04

## 2023-04-19 RX ADMIN — PHENYLEPHRINE HYDROCHLORIDE 100 MCG: 10 INJECTION INTRAVENOUS at 01:04

## 2023-04-19 RX ADMIN — FENTANYL CITRATE 50 MCG: 50 INJECTION, SOLUTION INTRAMUSCULAR; INTRAVENOUS at 12:04

## 2023-04-19 RX ADMIN — PROPOFOL 50 MG: 10 INJECTION, EMULSION INTRAVENOUS at 12:04

## 2023-04-19 RX ADMIN — CLINDAMYCIN PHOSPHATE 900 MG: 18 INJECTION, SOLUTION INTRAVENOUS at 12:04

## 2023-04-19 RX ADMIN — ACETAMINOPHEN 1000 MG: 500 TABLET, FILM COATED ORAL at 11:04

## 2023-04-19 RX ADMIN — PROPOFOL 200 MCG/KG/MIN: 10 INJECTION, EMULSION INTRAVENOUS at 12:04

## 2023-04-19 RX ADMIN — PROPOFOL 60 MG: 10 INJECTION, EMULSION INTRAVENOUS at 12:04

## 2023-04-19 RX ADMIN — FENTANYL CITRATE 50 MCG: 50 INJECTION, SOLUTION INTRAMUSCULAR; INTRAVENOUS at 01:04

## 2023-04-19 RX ADMIN — PROPOFOL 20 MG: 10 INJECTION, EMULSION INTRAVENOUS at 12:04

## 2023-04-19 RX ADMIN — SODIUM CHLORIDE, SODIUM LACTATE, POTASSIUM CHLORIDE, AND CALCIUM CHLORIDE: 600; 310; 30; 20 INJECTION, SOLUTION INTRAVENOUS at 12:04

## 2023-04-19 RX ADMIN — LIDOCAINE HYDROCHLORIDE 60 MG: 20 INJECTION, SOLUTION INTRAVENOUS at 12:04

## 2023-04-19 RX ADMIN — MIDAZOLAM HYDROCHLORIDE 2 MG: 1 INJECTION, SOLUTION INTRAMUSCULAR; INTRAVENOUS at 12:04

## 2023-04-19 RX ADMIN — ONDANSETRON HYDROCHLORIDE 4 MG: 2 INJECTION INTRAMUSCULAR; INTRAVENOUS at 01:04

## 2023-04-19 RX ADMIN — PREGABALIN 75 MG: 75 CAPSULE ORAL at 11:04

## 2023-04-19 RX ADMIN — GLYCOPYRROLATE 0.2 MG: 0.2 INJECTION, SOLUTION INTRAMUSCULAR; INTRAVITREAL at 01:04

## 2023-04-19 NOTE — PLAN OF CARE
Kellen Fraser has met all discharge criteria from Phase II. Vital Signs are stable.Discharge instructions given, patient verbalized understanding. Discharged from facility via wheelchair in stable condition.

## 2023-04-19 NOTE — ANESTHESIA POSTPROCEDURE EVALUATION
Anesthesia Post Evaluation    Patient: Kellen Fraser    Procedure(s) Performed: Procedure(s) (LRB):  FASCIOTOMY, PLANTAR, ENDOSCOPIC (Right)    Final Anesthesia Type: general      Patient location during evaluation: [facility]  Patient participation: Yes- Able to Participate  Level of consciousness: awake and alert  Post-procedure vital signs: reviewed and stable  Pain management: adequate  Airway patency: patent    PONV status at discharge: No PONV  Anesthetic complications: no      Cardiovascular status: blood pressure returned to baseline  Respiratory status: unassisted and spontaneous ventilation  Hydration status: euvolemic  Follow-up not needed.          Vitals Value Taken Time   BP 95/65 04/19/23 1357   Temp 35.7 04/19/23 1357   Pulse 65 04/19/23 1357   Resp 18 04/19/23 1357   SpO2 96 04/19/23 1357         No case tracking events are documented in the log.      Pain/Lynn Score: Pain Rating Prior to Med Admin: 4 (4/19/2023 11:53 AM)

## 2023-04-19 NOTE — INTERVAL H&P NOTE
The patient has been examined and the H&P has been reviewed:    I concur with the findings and no changes have occurred since H&P was written.    Surgery risks, benefits and alternative options discussed and understood by patient/family.      Right foot marked assurgicalsite with agreement from patient, doctor, nursing.    There are no hospital problems to display for this patient.

## 2023-04-19 NOTE — PLAN OF CARE
Kellen Fraser has met all discharge criteria from Phase II. Vital Signs are stable, ambulating  without difficulty. Discharge instructions given, patient verbalized understanding. Discharged from facility via wheelchair in stable condition.

## 2023-04-19 NOTE — BRIEF OP NOTE
[facility] - Surgery ([facility])  Brief Operative Note    Surgery Date: 4/19/2023     Surgeon(s) and Role:     * Clemente Young DPM - Primary    Assisting Surgeon: None    Pre-op Diagnosis:  Plantar fasciitis [M72.2]  Rupture of plantar fascia of right foot, initial encounter [S93.691A]  Foot pain, right [M79.671]    Post-op Diagnosis:  Post-Op Diagnosis Codes:     * Plantar fasciitis [M72.2]     * Rupture of plantar fascia of right foot, initial encounter [S93.691A]     * Foot pain, right [M79.671]    Procedure(s) (LRB):  FASCIOTOMY, PLANTAR, ENDOSCOPIC (Right)    Anesthesia: Local MAC    Operative Findings: plantar fascia released right foot    Estimated Blood Loss: * No values recorded between 4/19/2023  1:08 PM and 4/19/2023  1:55 PM *         Specimens:   Specimen (24h ago, onward)      None              Discharge Note    OUTCOME: Patient tolerated treatment/procedure well without complication and is now ready for discharge.    DISPOSITION: Home or Self Care    FINAL DIAGNOSIS:  <principal problem not specified>    FOLLOWUP: In clinic    DISCHARGE INSTRUCTIONS:    Discharge Procedure Orders   Diet general     Keep surgical extremity elevated     Ice to affected area     Call MD for:  extreme fatigue     Call MD for:  persistent dizziness or light-headedness     Call MD for:  hives     Call MD for:  redness, tenderness, or signs of infection (pain, swelling, redness, odor or green/yellow discharge around incision site)     Call MD for:  difficulty breathing, headache or visual disturbances     Call MD for:  severe uncontrolled pain     Call MD for:  persistent nausea and vomiting     Call MD for:  temperature >100.4     Leave dressing on - Keep it clean, dry, and intact until clinic visit     Weight bearing restrictions (specify)   Order Comments: Minimal weight bearing right foot in fx boot all times.

## 2023-04-19 NOTE — TRANSFER OF CARE
"Anesthesia Transfer of Care Note    Patient: Kellen Fraser    Procedure(s) Performed: Procedure(s) (LRB):  FASCIOTOMY, PLANTAR, ENDOSCOPIC (Right)    Patient location: [facility]    Anesthesia Type: general    Transport from OR: Transported from OR on room air with adequate spontaneous ventilation    Post pain: adequate analgesia    Post assessment: no apparent anesthetic complications    Post vital signs: stable    Level of consciousness: awake and alert    Nausea/Vomiting: no nausea/vomiting    Complications: none    Transfer of care protocol was followed      Last vitals:   Visit Vitals  /62   Ht 5' 4" (1.626 m)   Wt 111.1 kg (245 lb)   LMP 03/15/2023 (Approximate)   Breastfeeding No   BMI 42.05 kg/m²     "

## 2023-04-19 NOTE — OP NOTE
04/19/2023    Surgeon Hannah    No assist     Preop diagnosis plantar fascial rupture right foot, pain right foot, plantar fasciitis right foot, recalcitrant.      Postop diagnoses same     Procedure endoscopic plantar fasciotomy right foot.      No pathology     Anesthesia local with monitored anesthesia care.      Hemostasis  anatomic dissection    Estimated blood loss less than 5 mL     Materials 4-0 nylon suture     Injection 6 mL each 1% lidocaine plain and 0.5% Marcaine plain in a 50 50 mix.      Condition stable     Complications none apparent     Procedure in detail includes the following:     Under mild sedation the patient was brought to the operating room on the operating table and placed in the supine position.   Time-out was performed all patient identifiers site markings and procedures noted to be in agreement all present. Following IV sedation local anesthesia was obtained about the patient's right foot utilizing a posterior tibial nerve block of total of 12 cc a one-to-one mixture of 1% lidocaine plain and 0.5% Marcaine plain.      The right foot was scrubbed prepped and draped in usual aseptic manner.  A small proximally 8 mm incision was made on the plantar medial aspect of the patient's foot just distal to the medial calcaneal tubercle at the junction of the glabrous and normal skin this was made into the subcutaneous tissue and dissection then carried with a mosquito hemostat down to the deep fascia covering the abductor hallucis muscle of the right foot.  Plane was then dissected just in the inferior surface of the plantar aponeurosis of the foot from medial to lateral across the plantar foot tenting the skin on the opposite side where it was marked for incision.    Trocar and cannula were then passed into the medial portal along the fascial plane dissected tenting the skin on the lateral portion of the foot which was then incised allowing trocar and cannula passed through.  Trocar was withdrawn  cannula left in place.      Endoscope was introduced into the medial portal and the plantar fascia was visualized.  A hook blade was then in certain into the lateral border and used to transect the medial band of the plantar fascia.  As we progressed from the medial to the intermediate bands the hook blade depth proved inadequate to transect the septum in the medial band of the plantar fascia.  Hook blade was withdrawn.  Triangle blade was then introduced into the lateral portal and used to transect the septum divided medial and intermediate bands of the plantar fascia as well as the medial band and intermediate band of the plantar fascia.      Normal-appearing red muscle belly was visible as the plantar fascia was divided and gapped with tension of the toes dorsiflexor motion.  This time all instrumentation was withdrawn portals were reversed endoscope in the lateral portal and instrumentation into the left all remaining remnants of the plantar aponeurosis of the right foot were then transected verified visually.  The septum dividing  the intermediate lateral bands of the plantar fascia of the right foot was left intact as was the lateral band of the plantar fascia.      The wound was irrigated with copious amounts sterile normal saline through the cannula.  Trocar was reintroduced into the medial portal and the trocar and cannula removed as a unit.    The portals incisions medially and laterally were then closed with 4-0 nylon.  The wound was dressed with Xeroform 4x4s Kerlix and Ace bandage foot and ankle neutral position.  Short-leg cam boot was then applied to the patient's right lower extremity to help maintain position facilitate ambulation during recovery.      This patient appeared to tolerate all procedures and anesthesia well.  She was transferred to recovery with vital signs stable and vascular status intact all toes the right foot.  Following a period of postoperative monitoring she will be discharged  home on the following written and oral postoperative instructions.      Keep the dressing dry and intact, avoid excessive ambulation.  Ice and elevate the right foot when at rest.  Contact Dr. Young for all postoperative follow-up care if any problems should arise.  Ibuprofen 800 mg 1 tablet p.o. q.8 hours p.r.n. pain with 375 mg Tylenol p.o. q.4-6 hours to help supplement pain relief.      Follow up in the office one-week, sooner p.r.n.

## 2023-04-20 VITALS
HEART RATE: 69 BPM | RESPIRATION RATE: 16 BRPM | WEIGHT: 245 LBS | DIASTOLIC BLOOD PRESSURE: 80 MMHG | SYSTOLIC BLOOD PRESSURE: 127 MMHG | BODY MASS INDEX: 41.83 KG/M2 | HEIGHT: 64 IN | OXYGEN SATURATION: 99 % | TEMPERATURE: 98 F

## 2023-04-21 ENCOUNTER — PATIENT MESSAGE (OUTPATIENT)
Dept: ADMINISTRATIVE | Facility: HOSPITAL | Age: 48
End: 2023-04-21

## 2023-04-25 ENCOUNTER — OFFICE VISIT (OUTPATIENT)
Dept: PODIATRY | Facility: CLINIC | Age: 48
End: 2023-04-25
Payer: OTHER GOVERNMENT

## 2023-04-25 VITALS
DIASTOLIC BLOOD PRESSURE: 64 MMHG | WEIGHT: 245 LBS | HEART RATE: 69 BPM | BODY MASS INDEX: 41.83 KG/M2 | HEIGHT: 64 IN | SYSTOLIC BLOOD PRESSURE: 129 MMHG

## 2023-04-25 DIAGNOSIS — Z98.890 POST-OPERATIVE STATE: Primary | ICD-10-CM

## 2023-04-25 PROCEDURE — 99999 PR PBB SHADOW E&M-EST. PATIENT-LVL III: CPT | Mod: PBBFAC,,, | Performed by: PODIATRIST

## 2023-04-25 PROCEDURE — 99999 PR PBB SHADOW E&M-EST. PATIENT-LVL III: ICD-10-PCS | Mod: PBBFAC,,, | Performed by: PODIATRIST

## 2023-04-25 PROCEDURE — 99024 POSTOP FOLLOW-UP VISIT: CPT | Mod: ,,, | Performed by: PODIATRIST

## 2023-04-25 PROCEDURE — 99024 PR POST-OP FOLLOW-UP VISIT: ICD-10-PCS | Mod: ,,, | Performed by: PODIATRIST

## 2023-04-25 PROCEDURE — 99213 OFFICE O/P EST LOW 20 MIN: CPT | Mod: PBBFAC,PN | Performed by: PODIATRIST

## 2023-04-25 NOTE — PROGRESS NOTES
Subjective:      Patient ID: Kellen Fraser is a 48 y.o. female.    Chief Complaint: Post-op Evaluation    One-week status post surgery right foot endoscopic plantar fasciotomy.  Dressing clean dry intact.  Pain well managed on over-the-counter Tylenol and ibuprofen alternating.  Total nonweightbearing right and fracture boot knee walker.    Review of Systems   Constitutional: Negative for chills, diaphoresis, fever, malaise/fatigue and night sweats.   Cardiovascular:  Negative for claudication, cyanosis, leg swelling and syncope.   Skin:  Negative for color change, dry skin, nail changes, rash, suspicious lesions and unusual hair distribution.   Musculoskeletal:  Negative for falls, joint pain, joint swelling, muscle cramps, muscle weakness and stiffness.   Gastrointestinal:  Negative for constipation, diarrhea, nausea and vomiting.   Neurological:  Negative for brief paralysis, disturbances in coordination, focal weakness, numbness, paresthesias, sensory change and tremors.         Objective:      Physical Exam  Constitutional:       General: She is not in acute distress.     Appearance: She is well-developed. She is not diaphoretic.   Cardiovascular:      Pulses:           Popliteal pulses are 2+ on the right side and 2+ on the left side.        Dorsalis pedis pulses are 2+ on the right side and 2+ on the left side.        Posterior tibial pulses are 2+ on the right side and 2+ on the left side.      Comments: Capillary refill 3 seconds all toes/distal feet, all toes/both feet warm to touch.      Negative lymphadenopathy bilateral popliteal fossa and tarsal tunnel.      Negavie lower extremity edema bilateral.    Musculoskeletal:      Right ankle: No swelling, deformity, ecchymosis or lacerations. Normal range of motion. Normal pulse.      Right Achilles Tendon: Normal. No defects. Fink's test negative.      Comments: Mild tenderness to palpation surgical site of the right heel without deformity loss of  function signs of acute trauma.   Lymphadenopathy:      Lower Body: No right inguinal adenopathy. No left inguinal adenopathy.      Comments: Negative lymphadenopathy bilateral popliteal fossa and tarsal tunnel.    Negative lymphangitic streaking bilateral feet/ankles/legs.   Skin:     General: Skin is warm and dry.      Capillary Refill: Capillary refill takes 2 to 3 seconds.      Coloration: Skin is not pale.      Findings: No abrasion, bruising, burn, ecchymosis, erythema, laceration, lesion or rash.      Nails: There is no clubbing.      Comments: Portal incisions mediolateral plantar right heel or well coapted well approximated with sutures intact without gapping drainage pus tracking fluctuance malodor signs of infection.     Neurological:      Mental Status: She is alert and oriented to person, place, and time.      Sensory: No sensory deficit.      Motor: No tremor, atrophy or abnormal muscle tone.      Gait: Gait normal.      Comments: Negative tinel sign to percussion sural, superficial peroneal, deep peroneal, saphenous, and posterior tibial nerves right and left ankles and feet.    Negative allodynia both feet   Psychiatric:         Behavior: Behavior is cooperative.           Assessment:       Encounter Diagnosis   Name Primary?    Post-operative state Yes         Plan:       Kellen was seen today for post-op evaluation.    Diagnoses and all orders for this visit:    Post-operative state      I counseled the patient on her conditions, their implications and medical management.        Dressed wound with 4x4s Kerlix Ace bandage in foot and ankle neutral position.      Patient weightbear to tolerance in fracture boot right, casual shoe left.      Continue over-the-counter Tylenol and ibuprofen alternating to the patient's tolerance and needs.      One-week for suture removal, sooner p.r.n.          Follow up in about 1 week (around 5/2/2023) for Suture removal.

## 2023-05-01 RX ORDER — LEVOTHYROXINE SODIUM 200 UG/1
200 TABLET ORAL DAILY
Qty: 90 TABLET | Refills: 3 | Status: SHIPPED | OUTPATIENT
Start: 2023-05-01 | End: 2024-04-30

## 2023-05-02 ENCOUNTER — OFFICE VISIT (OUTPATIENT)
Dept: PODIATRY | Facility: CLINIC | Age: 48
End: 2023-05-02
Payer: OTHER GOVERNMENT

## 2023-05-02 VITALS
HEIGHT: 64 IN | HEART RATE: 72 BPM | WEIGHT: 245 LBS | BODY MASS INDEX: 41.83 KG/M2 | SYSTOLIC BLOOD PRESSURE: 138 MMHG | DIASTOLIC BLOOD PRESSURE: 66 MMHG

## 2023-05-02 DIAGNOSIS — Z98.890 POST-OPERATIVE STATE: Primary | ICD-10-CM

## 2023-05-02 PROCEDURE — 99024 PR POST-OP FOLLOW-UP VISIT: ICD-10-PCS | Mod: ,,, | Performed by: PODIATRIST

## 2023-05-02 PROCEDURE — 99999 PR PBB SHADOW E&M-EST. PATIENT-LVL III: ICD-10-PCS | Mod: PBBFAC,,, | Performed by: PODIATRIST

## 2023-05-02 PROCEDURE — 99999 PR PBB SHADOW E&M-EST. PATIENT-LVL III: CPT | Mod: PBBFAC,,, | Performed by: PODIATRIST

## 2023-05-02 PROCEDURE — 99024 POSTOP FOLLOW-UP VISIT: CPT | Mod: ,,, | Performed by: PODIATRIST

## 2023-05-02 PROCEDURE — 99213 OFFICE O/P EST LOW 20 MIN: CPT | Mod: PBBFAC,PN | Performed by: PODIATRIST

## 2023-05-02 NOTE — PROGRESS NOTES
Subjective:      Patient ID: Kellen Fraser is a 48 y.o. female.    Chief Complaint: Post-op Evaluation    Two weeks status post surgery right foot endoscopic plantar fasciotomy.  Dressing clean dry intact.  Pain well managed on over-the-counter Tylenol and ibuprofen alternating.  She is begun weight-bearing to tolerance in the fracture boot right gait assist only part of the time as needed.    Review of Systems   Constitutional: Negative for chills, diaphoresis, fever, malaise/fatigue and night sweats.   Cardiovascular:  Negative for claudication, cyanosis, leg swelling and syncope.   Skin:  Negative for color change, dry skin, nail changes, rash, suspicious lesions and unusual hair distribution.   Musculoskeletal:  Negative for falls, joint pain, joint swelling, muscle cramps, muscle weakness and stiffness.   Gastrointestinal:  Negative for constipation, diarrhea, nausea and vomiting.   Neurological:  Negative for brief paralysis, disturbances in coordination, focal weakness, numbness, paresthesias, sensory change and tremors.         Objective:      Physical Exam  Constitutional:       General: She is not in acute distress.     Appearance: She is well-developed. She is not diaphoretic.   Cardiovascular:      Pulses:           Popliteal pulses are 2+ on the right side and 2+ on the left side.        Dorsalis pedis pulses are 2+ on the right side and 2+ on the left side.        Posterior tibial pulses are 2+ on the right side and 2+ on the left side.      Comments: Capillary refill 3 seconds all toes/distal feet, all toes/both feet warm to touch.      Negative lymphadenopathy bilateral popliteal fossa and tarsal tunnel.      Negavie lower extremity edema bilateral.    Musculoskeletal:      Right ankle: No swelling, deformity, ecchymosis or lacerations. Normal range of motion. Normal pulse.      Right Achilles Tendon: Normal. No defects. Fink's test negative.      Comments: Mild tenderness to palpation surgical  site of the right heel without deformity loss of function signs of acute trauma.   Lymphadenopathy:      Lower Body: No right inguinal adenopathy. No left inguinal adenopathy.      Comments: Negative lymphadenopathy bilateral popliteal fossa and tarsal tunnel.    Negative lymphangitic streaking bilateral feet/ankles/legs.   Skin:     General: Skin is warm and dry.      Capillary Refill: Capillary refill takes 2 to 3 seconds.      Coloration: Skin is not pale.      Findings: No abrasion, bruising, burn, ecchymosis, erythema, laceration, lesion or rash.      Nails: There is no clubbing.      Comments: Portal incisions mediolateral plantar right heel or well coapted well approximated with sutures intact without gapping drainage pus tracking fluctuance malodor signs of infection.     Neurological:      Mental Status: She is alert and oriented to person, place, and time.      Sensory: No sensory deficit.      Motor: No tremor, atrophy or abnormal muscle tone.      Gait: Gait normal.      Comments: Negative tinel sign to percussion sural, superficial peroneal, deep peroneal, saphenous, and posterior tibial nerves right and left ankles and feet.    Negative allodynia both feet   Psychiatric:         Behavior: Behavior is cooperative.           Assessment:       Encounter Diagnosis   Name Primary?    Post-operative state Yes         Plan:       Kellen was seen today for post-op evaluation.    Diagnoses and all orders for this visit:    Post-operative state  -     Ambulatory referral/consult to Physical/Occupational Therapy; Future      I counseled the patient on her conditions, their implications and medical management.        Removed sutures right foot without event.      Covered wound sites with Band-Aids.  Do same as needed.      Continue ambulation to tolerance in fracture boot with gait assist as needed for stability.      Patient may return to work with the restriction weight-bearing to tolerance.      Prescribed  physical therapy for help with functional rehabilitation.      One month, sooner p.r.n.          No follow-ups on file.

## 2023-05-17 ENCOUNTER — CLINICAL SUPPORT (OUTPATIENT)
Dept: REHABILITATION | Facility: OTHER | Age: 48
End: 2023-05-17
Payer: OTHER GOVERNMENT

## 2023-05-17 DIAGNOSIS — Z98.890 POST-OPERATIVE STATE: ICD-10-CM

## 2023-05-17 DIAGNOSIS — R29.898 ANKLE WEAKNESS: ICD-10-CM

## 2023-05-17 DIAGNOSIS — Z74.09 IMPAIRED FUNCTIONAL MOBILITY, BALANCE, GAIT, AND ENDURANCE: ICD-10-CM

## 2023-05-17 DIAGNOSIS — M25.671 DECREASED RANGE OF MOTION OF RIGHT ANKLE: ICD-10-CM

## 2023-05-17 DIAGNOSIS — R29.898 WEAKNESS OF FOOT, RIGHT: Primary | ICD-10-CM

## 2023-05-17 PROCEDURE — 97140 MANUAL THERAPY 1/> REGIONS: CPT

## 2023-05-17 PROCEDURE — 97110 THERAPEUTIC EXERCISES: CPT

## 2023-05-17 PROCEDURE — 97161 PT EVAL LOW COMPLEX 20 MIN: CPT

## 2023-05-18 NOTE — PLAN OF CARE
"  [facility]  Physical Therapy Initial Evaluation  Date: 5/17/2023   Name: Kellen Fraser  Clinic Number: [id number]    Therapy Diagnosis:   Encounter Diagnoses   Name Primary?    Post-operative state     Weakness of foot, right Yes    Decreased range of motion of right ankle     Ankle weakness     Impaired functional mobility, balance, gait, and endurance      Physician: Clemente Young, YOLANDA    Physician Orders: PT Eval and Treat   Medical Diagnosis from Referral: Z98.890 (ICD-10-CM) - Post-operative state  Evaluation Date: 5/17/2023  Authorization Period Expiration: 5/1/2024  Plan of Care Expiration: 7/17/2023  Visit # / Visits authorized: 1/ 1 (pending) Progress Note Due: 6/17/2023  FOTO: 1/ 1    Precautions: Standard and hx of thyroid cancer    Time In: 2:20 pm  Time Out: 3:20 pm  Total Appointment Time (timed & untimed codes): 60 minutes    Subjective   Date of onset: 2+ years  History of current condition - Kellen reports: she received plantar fascia release procedure with Dr. Young on 4/19/2023. She reports she was told the fascia was 3x as thick as normal by the doctor. She has started to ween herself out of the boot and is able to walk in normal shoes (she presents in tennis shoes today). Still with pain when walking, standing, stairs, but improved from before surgery.       Current 3/10, worst 8/10, best 1/10   Location: right foot  Description: Aching  Aggravating Factors: Standing, Touching, Walking, and stairs  Easing Factors: massage, pain medication, and ice    Prior Therapy: Yes 2 bouts at this clinic prior to surgery  Social History: [place], 3 MOI no rail  Occupation: [employer]  Prior Level of Function: pain with ambulation and weight bearing  Current Level of Function: able to weight bear and ambulate, but with pain    Pts goals: "Walk normal and not have pain"      Imaging: XR FOOT COMPLETE 3 VIEW RIGHT     CLINICAL HISTORY:  post op;. Plantar fascial " fibromatosis     TECHNIQUE:  AP, lateral, and oblique views of the right foot were performed.     COMPARISON:  07/12/2022     FINDINGS:  No acute fracture or dislocation seen.  Small dorsal and plantar calcaneal spurs.  No soft tissue edema or radiopaque retained foreign body.     Impression:     No acute osseous abnormality seen.    Medical History:   Past Medical History:   Diagnosis Date    Allergy     BRCA negative 11/2014    Cervical disc disease     C5-C6, impacting thecal sac    Gallbladder sludge     Hypothyroidism     Thyroid cancer     Uterine fibroid        Surgical History:   Kellen Fraser  has a past surgical history that includes Thyroidectomy (2004); Cholecystectomy (2012); Foot surgery (Right); Breast biopsy (Right); Epidural steroid injection (N/A, 9/10/2019); and Endoscopic plantar fasciotomy (Right, 4/19/2023).    Medications:   Kellen has a current medication list which includes the following prescription(s): albuterol, ibuprofen, levothyroxine, lidocaine hcl 2%, and methocarbamol, and the following Facility-Administered Medications: sodium chloride 0.9%, aluminum & magnesium hydroxide-simethicone, doxycycline (VIBRAMYCIN) 100 mg in dextrose 5 % (D5W) 250 mL IVPB, and sodium chloride 0.9%.    Allergies:   Review of patient's allergies indicates:   Allergen Reactions    Morphine Shortness Of Breath    Shellfish containing products Hives and Itching    Betadine surgi-prep Hives    Dilaudid [hydromorphone (bulk)] Hives     Ok to take percocet    Dilaudid [hydromorphone] Hives    Erythromycin Hives    Influenza virus vaccines Hives    Meperidine Hives     Other reaction(s): Rash, Urticaria, Rash, Urticaria    Penicillins Hives     Other reaction(s): Other: hives    Reglan [metoclopramide] Nausea And Vomiting    Toradol [ketorolac] Nausea And Vomiting    Oyster shell Diarrhea and Nausea And Vomiting    Sulfamethoxazole-trimethoprim Nausea And Vomiting     Other reaction(s): Unknown          Objective  "    Observation: pt presents without crutches or walking boot    Sensation: Light touch:intact to light touch    GAIT DEVIATIONS: Kellen displays occasional unsteady gait;decreased step length;decreased weight shift;antalgic gait    Ankle Range of Motion:   Ankle Right Left   Dorsiflexion 3 9   Plantarflexion 38 43   Inversion 30 42   Eversion 7 21      Strength:   Right Ankle   Left Ankle    Dorsiflexion: 4+/5 Dorsiflexion: 5/5   Plantarflexion:  3/5 Plantarflexion: 4/5   Inversion: 4/5 Inversion: 4+/5   Eversion: 4/5 Eversion: 4+/5       Right LE  Left LE    Hip flexion: 4-/5 Hip flexion: 4-/5   Hip extension:  NT Hip extension: NT   Hip abduction: 3+/5 Hip abduction: 3+/5   Hip adduction: NT Hip adduction NT   Knee extension: 4/5 Knee extension: 4+/5   Knee flexion: 4/5 Knee flexion: 4+/5     Special Tests:   Right Left   Posterior Drawer Test - -   Anterior Drawer Test - -   Squeeze test - -   Fink test - -   Subtalar Neutral - -   Navicular Drop - -   Windlass test - -       Joint Mobility: hypomobility of R talocrural joint, normal on L    Palpation: tender to palpation of R plantar fascia and medal and lateral surface of foot    Flexibility: decreased soft tissue flexibility and mobility of R foot      CMS Impairment/Limitation/Restriction for FOTO foot Survey    Therapist reviewed FOTO scores for Kellen Fraser on 5/17/2023.   FOTO documents entered into [employer] - see Media section.    Limitation Score: 57%    Goal: 33%           TREATMENT   Treatment Time In: 2:45  Treatment Time Out: 3:20  Total Treatment time separate from Evaluation: 35 minutes    Kellen received therapeutic exercises to develop strength, ROM, and flexibility for 20 minutes including:  PF GTB 3x10  Towel scrunches 2x2 min  [place]eller pickups x 5 min  Inversion GTB 2x10  Eversion GTB 2x10  PF stretch seated 2x30"  PF stretch w/ towel 3x30"    Kellen received the following manual therapy techniques: Joint mobilizations, Myofacial release, and " Soft tissue Mobilization were applied to the: R ankle/foot for 15 minutes, including:  STM R plantarfascia  PROM stretching R ankle  Talocrural joint mobs grade III/IV    Kellen received cold pack for 5 minutes to R foot/ankle.    Home Exercises and Patient Education Provided    Education provided:   - HEP, POC    Written Home Exercises Provided: yes.  Exercises were reviewed and Kellen was able to demonstrate them prior to the end of the session.  Kellen demonstrated good  understanding of the education provided.     See EMR under Patient Instructions for exercises provided 5/17/2023.    Assessment   Kellen is a 48 y.o. female referred to outpatient Physical Therapy with a medical diagnosis of Z98.890 (ICD-10-CM) - Post-operative state. Pt is 4 weeks post op after plantar fascia release with Dr. Young on 4/19/2023. Pt presents in tennis shoes ambulating with slight gait deviations. Incisions are healed well with no signs of bruising or infection. Pt presents with decreased range of motion of R ankle/foot, weakness of R LE, weakness of R ankle/foot, decreased joint mobility, decreased soft tissue flexibility and mobility, balance deficits and decreased functional mobility tolerance. These deficits are limiting patient in full participation in ADL's, functional mobility, and leisure activities, most notably walking, standing, and stairs. Pt will benefit from skilled outpatient Physical Therapy to address the deficits stated above and in the chart below, provide pt/family education, and to maximize pt's level of independence.     Pt prognosis is Good.     Plan of care discussed with patient: Yes  Pt's spiritual, cultural and educational needs considered and patient is agreeable to the plan of care and goals as stated below:     Anticipated Barriers for therapy: none    Medical Necessity is demonstrated by the following  History  Co-morbidities and personal factors that may impact the plan of care Co-morbidities:    high BMI, history of cancer, prior abdominal surgery, and hypothyroidism    Personal Factors:   no deficits     Complexity: low   Examination  Body Structures and Functions, activity limitations and participation restrictions that may impact the plan of care Body Regions:   lower extremities    Body Systems:    ROM  strength  gross coordinated movement  balance  gait    Participation Restrictions:   See above    Activity limitations:   Learning and applying knowledge  no deficits    General Tasks and Commands  no deficits    Communication  no deficits    Mobility  lifting and carrying objects  walking    Self care  no deficits    Domestic Life  assisting others    Interactions/Relationships  no deficits    Life Areas  no deficits    Community and Social Life  no deficits         Complexity: low  See FOTO outcome assessment   Clinical Presentation stable and uncomplicated low   Decision Making/ Complexity Score: low     Goals:  GOALS: Short Term Goals:  4 weeks  1.Report decreased  in  pain at worse less than  <   / =  4  /10  to increase tolerance for improved functional actvities. On going  2. Pt to improve range of motion by 25% to allow for improved functional mobility to allow for improvement in IADLs. On going  3. Increased R LE MMT 1/2 grade  to increase tolerance for ADL and work activities.On going  4. Pt to report ability beat full weight through R foot without pain. Ongoing  5. Pt to tolerate HEP to improve ROM and independence with ADL's. On going    Long Term Goals: 8 weeks  1.Report decreased  in  pain at worse  less than  <   / =  2  /10  to increase tolerance for improved functional actvities. On going  2.Pt to improve range of motion by 75% to allow for improved functional mobility to allow for improvement in IADLs. On going  3.Increased R LE MMT 1 grade  to increase tolerance for ADL and work activities.On going  4. Pt will score at least 33% or less on  FOTO outcome assessment  to increase  functional activities and mobility. On going  5. Pt to be Independent with HEP to improve ROM and independence with ADL's. On going  6. Pt to be able to maintain right single leg balance for 30 seconds       Plan   Plan of care Certification: 5/17/2023 to 7/17/2023.    Outpatient Physical Therapy 2 times weekly for 8 weeks to include the following interventions: Gait Training, Manual Therapy, Moist Heat/ Ice, Neuromuscular Re-ed, Patient Education, Therapeutic Activities, and Therapeutic Exercise. Dry needling.     Hugo Kay, TRUMAN      I CERTIFY THE NEED FOR THESE SERVICES FURNISHED UNDER THIS PLAN OF TREATMENT AND WHILE UNDER MY CARE   Physician's comments:     Physician's Signature: ___________________________________________________

## 2023-05-30 ENCOUNTER — OFFICE VISIT (OUTPATIENT)
Dept: PODIATRY | Facility: CLINIC | Age: 48
End: 2023-05-30
Payer: OTHER GOVERNMENT

## 2023-05-30 VITALS — HEIGHT: 64 IN | BODY MASS INDEX: 41.83 KG/M2 | WEIGHT: 245 LBS

## 2023-05-30 DIAGNOSIS — Z98.890 POST-OPERATIVE STATE: Primary | ICD-10-CM

## 2023-05-30 PROCEDURE — 99024 PR POST-OP FOLLOW-UP VISIT: ICD-10-PCS | Mod: ,,, | Performed by: PODIATRIST

## 2023-05-30 PROCEDURE — 99999 PR PBB SHADOW E&M-EST. PATIENT-LVL III: CPT | Mod: PBBFAC,,, | Performed by: PODIATRIST

## 2023-05-30 PROCEDURE — 99213 OFFICE O/P EST LOW 20 MIN: CPT | Mod: PBBFAC,PN | Performed by: PODIATRIST

## 2023-05-30 PROCEDURE — 99999 PR PBB SHADOW E&M-EST. PATIENT-LVL III: ICD-10-PCS | Mod: PBBFAC,,, | Performed by: PODIATRIST

## 2023-05-30 PROCEDURE — 99024 POSTOP FOLLOW-UP VISIT: CPT | Mod: ,,, | Performed by: PODIATRIST

## 2023-05-30 NOTE — PROGRESS NOTES
Subjective:      Patient ID: Kellen Fraser is a 48 y.o. female.    Chief Complaint: Post-op Evaluation    6 weeks status post endoscopic plantar fasciotomy right.  Patient attending physical therapy, using over-the-counter arch supports and athletic shoes.  Activity is improved pain is greatly reduced in the heel.  Still has deep bone pain occasionally in arch on the outside edge of the midfoot indicated with index finger right.  Feel she is improving well with physical therapy.  See 1st step pain persists.    Review of Systems   Musculoskeletal:  Positive for joint pain.         Objective:      Physical Exam  Constitutional:       General: She is not in acute distress.     Appearance: She is well-developed. She is not diaphoretic.   Cardiovascular:      Pulses:           Popliteal pulses are 2+ on the right side and 2+ on the left side.        Dorsalis pedis pulses are 2+ on the right side and 2+ on the left side.        Posterior tibial pulses are 2+ on the right side and 2+ on the left side.      Comments: Capillary refill 3 seconds all toes/distal feet, all toes/both feet warm to touch.      Negative lymphadenopathy bilateral popliteal fossa and tarsal tunnel.      Negavie lower extremity edema bilateral.    Musculoskeletal:      Right ankle: No swelling, deformity, ecchymosis or lacerations. Normal range of motion. Normal pulse.      Right Achilles Tendon: Normal. No defects. Fink's test negative.      Comments: No focal pain to palpation range of motion right foot and ankle today.      Patient still has slightly reduced ankle range of motion with the knee extended right and left.      Otherwise, Normal angle, base, station of gait. All ten toes without clubbing, cyanosis, or signs of ischemia.  No pain to palpation bilateral lower extremities.  Range of motion, stability, muscle strength, and muscle tone normal bilateral feet and legs.    Lymphadenopathy:      Lower Body: No right inguinal adenopathy. No  left inguinal adenopathy.      Comments: Negative lymphadenopathy bilateral popliteal fossa and tarsal tunnel.    Negative lymphangitic streaking bilateral feet/ankles/legs.   Skin:     General: Skin is warm and dry.      Capillary Refill: Capillary refill takes 2 to 3 seconds.      Coloration: Skin is not pale.      Findings: No abrasion, bruising, burn, ecchymosis, erythema, laceration, lesion or rash.      Nails: There is no clubbing.      Comments:      Neurological:      Mental Status: She is alert and oriented to person, place, and time.      Sensory: No sensory deficit.      Motor: No tremor, atrophy or abnormal muscle tone.      Gait: Gait normal.      Deep Tendon Reflexes:      Reflex Scores:       Patellar reflexes are 2+ on the right side and 2+ on the left side.       Achilles reflexes are 2+ on the right side and 2+ on the left side.     Comments: Negative tinel sign to percussion sural, superficial peroneal, deep peroneal, saphenous, and posterior tibial nerves right and left ankles and feet.     Psychiatric:         Behavior: Behavior is cooperative.           Assessment:       Encounter Diagnosis   Name Primary?    Post-operative state Yes         Plan:       Kellen was seen today for post-op evaluation.    Diagnoses and all orders for this visit:    Post-operative state  -     ORTHOTIC DEVICE (DME)      I counseled the patient on her conditions, their implications and medical management.    Returned to night splint to use to help address morning pain.    Finish PT, custom orthotics.      Activity to tolerance and shoes of choice.      P.r.n.          No follow-ups on file.

## 2023-06-01 ENCOUNTER — CLINICAL SUPPORT (OUTPATIENT)
Dept: REHABILITATION | Facility: OTHER | Age: 48
End: 2023-06-01
Payer: OTHER GOVERNMENT

## 2023-06-01 DIAGNOSIS — Z74.09 IMPAIRED FUNCTIONAL MOBILITY, BALANCE, GAIT, AND ENDURANCE: ICD-10-CM

## 2023-06-01 DIAGNOSIS — R29.898 WEAKNESS OF FOOT, RIGHT: Primary | ICD-10-CM

## 2023-06-01 DIAGNOSIS — M25.671 DECREASED RANGE OF MOTION OF RIGHT ANKLE: ICD-10-CM

## 2023-06-01 DIAGNOSIS — R29.898 ANKLE WEAKNESS: ICD-10-CM

## 2023-06-01 PROCEDURE — 97140 MANUAL THERAPY 1/> REGIONS: CPT

## 2023-06-01 PROCEDURE — 97112 NEUROMUSCULAR REEDUCATION: CPT

## 2023-06-01 PROCEDURE — 97110 THERAPEUTIC EXERCISES: CPT

## 2023-06-01 NOTE — PROGRESS NOTES
"[facility]   Physical Therapy Treatment Note      Name: Kellen Fraser  Clinic Number: [id number]    Therapy Diagnosis:   Encounter Diagnoses   Name Primary?    Weakness of foot, right Yes    Decreased range of motion of right ankle     Ankle weakness     Impaired functional mobility, balance, gait, and endurance      Physician: Clemente Young DPM    Visit Date: 6/1/2023    Physician Orders: PT Eval and Treat   Medical Diagnosis from Referral: Z98.890 (ICD-10-CM) - Post-operative state  Evaluation Date: 5/17/2023  Authorization Period Expiration: 5/1/2024  Plan of Care Expiration: 7/17/2023  Visit # / Visits authorized: 1/ 1 (pending) Progress Note Due: 6/17/2023  FOTO: 1/ 1    PTA Visit #: 0/5     Time In: 3:05 pm  Time Out: 4:05 pm  Total Billable Time: 55 minutes    Subjective     Pt reports: some pain when walking and first thing in the morning, but it feels like the bones and not like the pain she was having before surgery.     She was compliant with home exercise program.  Response to previous treatment: decreased pain  Functional change: improved walking tolerance    Pain: 3/10  Location: right foot    Objective      Objective Measures updated at progress report unless specified.     Treatment     Kellen received the treatments listed below:      therapeutic exercises to develop strength, endurance, ROM, and flexibility for 30 minutes including:  Recumbent bike x5'  Arch lifts 2x15x5"  Towel scrunches x3'  [place] pickups 2x2'  Toe yoga 2x1'  Seated heel raise 10# 2x15  Eversion GTB 2x10x3"  Gastroc slantboard stretch 2x30"  Soleus slantboard stretch 2x30"      manual therapy techniques: Joint mobilizations and Soft tissue Mobilization were applied to the: R foot/ankle for 15 minutes, including:  STM R PF  Talocrural mobs  Ankle PROM    neuromuscular re-education activities to improve: Balance, Coordination, and Proprioception for 10 minutes. The following activities were " "included:  SLB on airex 1 pinky 3x30"  Tandem balance on airex 3x30" ea  Standing heel raise with forefoot elevated on airex 2x15    cold pack for 5 minutes to R foot.    Patient Education and Home Exercises       Education provided:   - HEP, POC    Written Home Exercises Provided: Patient instructed to cont prior HEP. Exercises were reviewed and Kellen was able to demonstrate them prior to the end of the session.  Kellen demonstrated good  understanding of the education provided. See EMR under Patient Instructions for exercises provided during therapy sessions    Assessment     Kellen presents today for her first followup since initial evaluation. She demonstrates improved gait mechanics and standing and functional mobility tolerance with less pain. Reviewed HEP with good demo and min cueing required. Progressed dynamic balance, strengthening, and neuro re-education of ankle and foot intrinsic musculature with good tolerance and no adverse effects. Continue to progress as tolerated.     Kellen Is progressing well towards her goals.   Pt prognosis is Good.     Pt will continue to benefit from skilled outpatient physical therapy to address the deficits listed in the problem list box on initial evaluation, provide pt/family education and to maximize pt's level of independence in the home and community environment.     Pt's spiritual, cultural and educational needs considered and pt agreeable to plan of care and goals.     Anticipated barriers to physical therapy: none       Goals:  GOALS: Short Term Goals:  4 weeks  1.Report decreased  in  pain at worse less than  <   / =  4  /10  to increase tolerance for improved functional actvities. On going  2. Pt to improve range of motion by 25% to allow for improved functional mobility to allow for improvement in IADLs. On going  3. Increased R LE MMT 1/2 grade  to increase tolerance for ADL and work activities.On going  4. Pt to report ability beat full weight through R foot " without pain. Ongoing  5. Pt to tolerate HEP to improve ROM and independence with ADL's. On going     Long Term Goals: 8 weeks  1.Report decreased  in  pain at worse  less than  <   / =  2  /10  to increase tolerance for improved functional actvities. On going  2.Pt to improve range of motion by 75% to allow for improved functional mobility to allow for improvement in IADLs. On going  3.Increased R LE MMT 1 grade  to increase tolerance for ADL and work activities.On going  4. Pt will score at least 33% or less on  FOTO outcome assessment  to increase functional activities and mobility. On going  5. Pt to be Independent with HEP to improve ROM and independence with ADL's. On going  6. Pt to be able to maintain right single leg balance for 30 seconds        Plan   Plan of care Certification: 5/17/2023 to 7/17/2023.    Continue Treatment per established POC.     Hugo Kay, PT

## 2023-06-13 ENCOUNTER — CLINICAL SUPPORT (OUTPATIENT)
Dept: REHABILITATION | Facility: OTHER | Age: 48
End: 2023-06-13
Payer: OTHER GOVERNMENT

## 2023-06-13 DIAGNOSIS — R29.898 ANKLE WEAKNESS: ICD-10-CM

## 2023-06-13 DIAGNOSIS — M25.671 DECREASED RANGE OF MOTION OF RIGHT ANKLE: ICD-10-CM

## 2023-06-13 DIAGNOSIS — R29.898 WEAKNESS OF FOOT, RIGHT: Primary | ICD-10-CM

## 2023-06-13 DIAGNOSIS — Z74.09 IMPAIRED FUNCTIONAL MOBILITY, BALANCE, GAIT, AND ENDURANCE: ICD-10-CM

## 2023-06-13 PROCEDURE — 97112 NEUROMUSCULAR REEDUCATION: CPT

## 2023-06-13 PROCEDURE — 97140 MANUAL THERAPY 1/> REGIONS: CPT

## 2023-06-13 NOTE — PROGRESS NOTES
"[facility]   Physical Therapy Treatment Note      Name: Kellen Fraser  Clinic Number: [id number]    Therapy Diagnosis:   Encounter Diagnoses   Name Primary?    Weakness of foot, right Yes    Decreased range of motion of right ankle     Ankle weakness     Impaired functional mobility, balance, gait, and endurance        Physician: Clemente Young DPM    Visit Date: 6/13/2023    Physician Orders: PT Eval and Treat   Medical Diagnosis from Referral: Z98.890 (ICD-10-CM) - Post-operative state  Evaluation Date: 5/17/2023  Authorization Period Expiration: 5/1/2024  Plan of Care Expiration: 7/17/2023  Visit # / Visits authorized: 3/15 Progress Note Due: 6/17/2023  FOTO: 1/ 1    PTA Visit #: 0/5     Time In: 3:15 pm  Time Out: 4:15 pm  Total Billable Time: 30 minutes  Total Treatment Time: 60 minutes    Subjective     Pt reports: no pain in the bottom of the foot near plantar fascia, just near the incision sites and some bone soreness on the outside of the foot  She was compliant with home exercise program.  Response to previous treatment: decreased pain  Functional change: improved walking tolerance    Pain: 3/10  Location: right foot    Objective      Objective Measures updated at progress report unless specified.     Treatment     Kellen received the treatments listed below:      therapeutic exercises to develop strength, endurance, ROM, and flexibility for 35 minutes including:  Recumbent bike x5'  Arch lifts 2x15x5"  Towel scrunches x3'  [place] pickups 2x2'  Toe yoga 2x1'  Seated heel raise 10# 2x15  Eversion GTB 2x10x3"  Gastroc slantboard stretch 2x30"  Soleus slantboard stretch 2x30"      manual therapy techniques: Joint mobilizations and Soft tissue Mobilization were applied to the: R foot/ankle for 10 minutes, including:  STM R PF  Talocrural mobs  Ankle PROM    neuromuscular re-education activities to improve: Balance, Coordination, and Proprioception for 10 minutes. The following " "activities were included:  SLB on airex 1 pinky 3x30"  Tandem balance on airex 3x30" ea  Standing heel raise with forefoot elevated on airex 2x15    cold pack for 5 minutes to R foot.    Patient Education and Home Exercises       Education provided:   - HEP, POC    Written Home Exercises Provided: Patient instructed to cont prior HEP. Exercises were reviewed and Kellen was able to demonstrate them prior to the end of the session.  Kellen demonstrated good  understanding of the education provided. See EMR under Patient Instructions for exercises provided during therapy sessions    Assessment     Kellen presents today with minimal complaints of pain. She demonstrates improved gait mechanics with and without shoes. Residual deviations appear to be from weening off of wearing a boot for over 1 year more so that pain altering gait. Continuation and progression of dynamic balance and foot and ankle strengthening with good tolerance and no adverse effects. Continue to progress as tolerated.     Kellen Is progressing well towards her goals.   Pt prognosis is Good.     Pt will continue to benefit from skilled outpatient physical therapy to address the deficits listed in the problem list box on initial evaluation, provide pt/family education and to maximize pt's level of independence in the home and community environment.     Pt's spiritual, cultural and educational needs considered and pt agreeable to plan of care and goals.     Anticipated barriers to physical therapy: none       Goals:  GOALS: Short Term Goals:  4 weeks  1.Report decreased  in  pain at worse less than  <   / =  4  /10  to increase tolerance for improved functional actvities. On going  2. Pt to improve range of motion by 25% to allow for improved functional mobility to allow for improvement in IADLs. On going  3. Increased R LE MMT 1/2 grade  to increase tolerance for ADL and work activities.On going  4. Pt to report ability beat full weight through R foot " without pain. Ongoing  5. Pt to tolerate HEP to improve ROM and independence with ADL's. On going     Long Term Goals: 8 weeks  1.Report decreased  in  pain at worse  less than  <   / =  2  /10  to increase tolerance for improved functional actvities. On going  2.Pt to improve range of motion by 75% to allow for improved functional mobility to allow for improvement in IADLs. On going  3.Increased R LE MMT 1 grade  to increase tolerance for ADL and work activities.On going  4. Pt will score at least 33% or less on  FOTO outcome assessment  to increase functional activities and mobility. On going  5. Pt to be Independent with HEP to improve ROM and independence with ADL's. On going  6. Pt to be able to maintain right single leg balance for 30 seconds        Plan   Plan of care Certification: 5/17/2023 to 7/17/2023.    Continue Treatment per established POC.     Hugo Kay, PT   6/15/2023

## 2024-05-21 ENCOUNTER — TELEPHONE (OUTPATIENT)
Dept: INTERNAL MEDICINE | Facility: CLINIC | Age: 49
End: 2024-05-21
Payer: OTHER GOVERNMENT

## 2024-05-21 ENCOUNTER — PATIENT MESSAGE (OUTPATIENT)
Dept: INTERNAL MEDICINE | Facility: CLINIC | Age: 49
End: 2024-05-21
Payer: OTHER GOVERNMENT

## 2024-05-22 ENCOUNTER — OFFICE VISIT (OUTPATIENT)
Dept: INTERNAL MEDICINE | Facility: CLINIC | Age: 49
End: 2024-05-22
Payer: OTHER GOVERNMENT

## 2024-05-22 ENCOUNTER — HOSPITAL ENCOUNTER (OUTPATIENT)
Dept: RADIOLOGY | Facility: HOSPITAL | Age: 49
Discharge: HOME | End: 2024-05-22
Attending: INTERNAL MEDICINE
Payer: OTHER GOVERNMENT

## 2024-05-22 VITALS
BODY MASS INDEX: 40.19 KG/M2 | HEART RATE: 78 BPM | SYSTOLIC BLOOD PRESSURE: 132 MMHG | HEIGHT: 64 IN | WEIGHT: 235.44 LBS | OXYGEN SATURATION: 98 % | DIASTOLIC BLOOD PRESSURE: 64 MMHG

## 2024-05-22 DIAGNOSIS — G43.809 MIGRAINE VARIANTS, NOT INTRACTABLE: ICD-10-CM

## 2024-05-22 DIAGNOSIS — R29.818 OTHER SYMPTOMS AND SIGNS INVOLVING THE NERVOUS SYSTEM: Primary | ICD-10-CM

## 2024-05-22 DIAGNOSIS — R29.898 LEFT HAND WEAKNESS: ICD-10-CM

## 2024-05-22 DIAGNOSIS — R29.818 OTHER SYMPTOMS AND SIGNS INVOLVING THE NERVOUS SYSTEM: ICD-10-CM

## 2024-05-22 PROCEDURE — 70450 CT HEAD/BRAIN W/O DYE: CPT | Mod: 26,,, | Performed by: RADIOLOGY

## 2024-05-22 PROCEDURE — 99215 OFFICE O/P EST HI 40 MIN: CPT | Mod: PBBFAC,25 | Performed by: INTERNAL MEDICINE

## 2024-05-22 PROCEDURE — 99999 PR PBB SHADOW E&M-EST. PATIENT-LVL V: CPT | Mod: PBBFAC,,, | Performed by: INTERNAL MEDICINE

## 2024-05-22 PROCEDURE — 70450 CT HEAD/BRAIN W/O DYE: CPT | Mod: TC

## 2024-05-22 PROCEDURE — 99215 OFFICE O/P EST HI 40 MIN: CPT | Mod: S$PBB,,, | Performed by: INTERNAL MEDICINE

## 2024-05-22 RX ORDER — IBUPROFEN 800 MG/1
800 TABLET ORAL 3 TIMES DAILY PRN
Qty: 30 TABLET | Refills: 0 | Status: SHIPPED | OUTPATIENT
Start: 2024-05-22

## 2024-05-22 NOTE — PROGRESS NOTES
Subjective:       Patient ID: Kellen Fraser is a 49 y.o. female.    Chief Complaint: Neck Pain and Migraine    Patient is here for followup for chronic conditions/urgent issue.  Last week had severe R sided head migraine a/w  L hand finger numbness (completely new onset), the numbness has been constant. This migraine lasted 3 days.  The left hand numbness has been continuous.    She does migraines at times, usu gets them around her cycle.    Also c/o R sided neck pains for 1 month.  She has had neck pains in the past.      Review of Systems   Constitutional:  Negative for activity change and fatigue.   Respiratory:  Negative for shortness of breath and wheezing.    Cardiovascular:  Negative for chest pain and palpitations.   Gastrointestinal:  Negative for abdominal distention and abdominal pain.   Musculoskeletal:  Positive for neck pain. Negative for arthralgias.   Neurological:  Positive for weakness (Left hand), numbness and headaches.   Psychiatric/Behavioral:  Negative for confusion.            Objective:      Physical Exam  Vitals reviewed.   Constitutional:       General: She is not in acute distress.     Appearance: Normal appearance. She is well-developed. She is not ill-appearing, toxic-appearing or diaphoretic.   HENT:      Head: Normocephalic and atraumatic.   Eyes:      General: No scleral icterus.     Pupils: Pupils are equal, round, and reactive to light.   Neck:      Thyroid: No thyromegaly.   Cardiovascular:      Rate and Rhythm: Normal rate and regular rhythm.      Heart sounds: Normal heart sounds. No murmur heard.     No friction rub. No gallop.   Pulmonary:      Effort: Pulmonary effort is normal. No respiratory distress.      Breath sounds: Normal breath sounds. No wheezing or rales.   Abdominal:      General: Bowel sounds are normal. There is no distension.      Palpations: Abdomen is soft. There is no mass.      Tenderness: There is no abdominal tenderness. There is no guarding or rebound.    Musculoskeletal:         General: No tenderness. Normal range of motion.      Cervical back: Normal range of motion.      Comments: Painful neck range of motion although range of motion is normal and intact.  Hyperesthesia to light touch sensation around the neck.   Lymphadenopathy:      Cervical: No cervical adenopathy.   Neurological:      General: No focal deficit present.      Mental Status: She is alert and oriented to person, place, and time.      Comments: Normal light touch sensation in the left hand and all the fingers.  Mild decrease in strength detected in left hand thumb finger and middle finger.  Negative Phalen and Tinel's tests on the left wrist.    Neuro  CN 2, 3, 4, 6: EOMI, nl visual fields, nl gross vision test, PERRLA  CN 5: nl chewing, nl facial sensation  CN 7: nl smile, nl brow wrinkle, nl eyes closure  CN 8: nl hearing  CN 9, 10: nl uvular elevation on phonation  CN 11: nl shoulder shrug  CN 12: nl tongue protrusion, midline, nl lateral deviation   Strength:  Mild decreased strength left thumb to middle fingers otherwise normal strength in the body.  Sensation: nl to light touch proximally and distally, upper and lower extremity  DTR: 2+ upper and lower extremity, equal and symmetrical  Cerebellum: nl FTN, nl RRAM, nl HTT walking, neg Romberg  Gait: Nl  Speech: clear, no dysarthria  Thinking: clear, goal oriented, no delusions  MMSE: grossly nl     Psychiatric:         Mood and Affect: Mood normal.         Speech: Speech normal.         Behavior: Behavior normal.         Assessment:       1. Other symptoms and signs involving the nervous system    2. Left hand weakness    3. Migraine variants, not intractable        Plan:       Kellen was seen today for neck pain and migraine.    Diagnoses and all orders for this visit:    Other symptoms and signs involving the nervous system  -     CT Head Without Contrast; Future  -     Ambulatory referral/consult to Neurology; Future  -     ibuprofen  (ADVIL,MOTRIN) 800 MG tablet; Take 1 tablet (800 mg total) by mouth 3 (three) times daily as needed for Pain.    Left hand weakness  -     CT Head Without Contrast; Future  -     Ambulatory referral/consult to Neurology; Future  -     ibuprofen (ADVIL,MOTRIN) 800 MG tablet; Take 1 tablet (800 mg total) by mouth 3 (three) times daily as needed for Pain.  This could be the sequela of a migraine also important to make sure for any stroke.  She denies any obvious compression to the arm nerves such as that which could cause a radial nerve palsy.  Will refer patient to Neurology and consider a nerve conduction study.  Explained that if not better in 1-2 weeks, pt should rtc/call PCP      Migraine variants, not intractable  -     ibuprofen (ADVIL,MOTRIN) 800 MG tablet; Take 1 tablet (800 mg total) by mouth 3 (three) times daily as needed for Pain.    Neck pains likely from known degenerative arthritis, will prescribe ibuprofen and call back if symptoms worsen.    Over 40 minutes spent with patient and majority in counseling and patient education.      Follow up in about 3 months (around 8/22/2024).    Future Appointments   Date Time Provider Department Center   7/24/2024  1:00 PM Kim Langford MD [facility]   8/23/2024  2:00 PM Herman Rivera MD [facility]

## 2024-05-23 DIAGNOSIS — Z12.31 OTHER SCREENING MAMMOGRAM: ICD-10-CM

## 2024-05-24 RX ORDER — LEVOTHYROXINE SODIUM 200 UG/1
TABLET ORAL
Qty: 90 TABLET | Refills: 0 | Status: SHIPPED | OUTPATIENT
Start: 2024-05-24

## 2024-05-24 NOTE — TELEPHONE ENCOUNTER
Refill Routing Note   Medication(s) are not appropriate for processing by [facility] for the following reason(s):        Patient not seen by provider within 15 months  Required labs outdated    [facility] action(s):  Defer               Appointments  past 12m or future 3m with PCP    Date Provider   Last Visit   2/7/2022 Mara Linares MD   Next Visit   Visit date not found Mara Linares MD   ED visits in past 90 days: 0        Note composed:4:26 PM 05/24/2024

## 2024-06-10 ENCOUNTER — PATIENT MESSAGE (OUTPATIENT)
Dept: INTERNAL MEDICINE | Facility: CLINIC | Age: 49
End: 2024-06-10
Payer: OTHER GOVERNMENT

## 2024-07-24 ENCOUNTER — OFFICE VISIT (OUTPATIENT)
Dept: NEUROLOGY | Facility: CLINIC | Age: 49
End: 2024-07-24
Payer: OTHER GOVERNMENT

## 2024-07-24 VITALS
HEIGHT: 64 IN | WEIGHT: 233.69 LBS | BODY MASS INDEX: 39.9 KG/M2 | DIASTOLIC BLOOD PRESSURE: 82 MMHG | SYSTOLIC BLOOD PRESSURE: 119 MMHG | HEART RATE: 70 BPM

## 2024-07-24 DIAGNOSIS — G45.9 TRANSIENT CEREBRAL ISCHEMIA, UNSPECIFIED TYPE: ICD-10-CM

## 2024-07-24 DIAGNOSIS — M54.2 CERVICALGIA: ICD-10-CM

## 2024-07-24 DIAGNOSIS — G43.001 MIGRAINE WITHOUT AURA AND WITH STATUS MIGRAINOSUS, NOT INTRACTABLE: ICD-10-CM

## 2024-07-24 DIAGNOSIS — R20.0 NUMBNESS OF LEFT HAND: Primary | ICD-10-CM

## 2024-07-24 DIAGNOSIS — G44.86 CERVICOGENIC HEADACHE: ICD-10-CM

## 2024-07-24 PROCEDURE — 99204 OFFICE O/P NEW MOD 45 MIN: CPT | Mod: S$PBB,,, | Performed by: STUDENT IN AN ORGANIZED HEALTH CARE EDUCATION/TRAINING PROGRAM

## 2024-07-24 PROCEDURE — 99999 PR PBB SHADOW E&M-EST. PATIENT-LVL III: CPT | Mod: PBBFAC,,, | Performed by: STUDENT IN AN ORGANIZED HEALTH CARE EDUCATION/TRAINING PROGRAM

## 2024-07-24 PROCEDURE — 99213 OFFICE O/P EST LOW 20 MIN: CPT | Mod: PBBFAC | Performed by: STUDENT IN AN ORGANIZED HEALTH CARE EDUCATION/TRAINING PROGRAM

## 2024-07-24 RX ORDER — METHOCARBAMOL 500 MG/1
500 TABLET, FILM COATED ORAL 3 TIMES DAILY PRN
Qty: 120 TABLET | Refills: 2 | Status: SHIPPED | OUTPATIENT
Start: 2024-07-24
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
## 2024-08-09 ENCOUNTER — HOSPITAL ENCOUNTER (OUTPATIENT)
Dept: RADIOLOGY | Facility: OTHER | Age: 49
Discharge: HOME | End: 2024-08-09
Attending: STUDENT IN AN ORGANIZED HEALTH CARE EDUCATION/TRAINING PROGRAM
Payer: OTHER GOVERNMENT

## 2024-08-09 DIAGNOSIS — M54.2 CERVICALGIA: ICD-10-CM

## 2024-08-09 DIAGNOSIS — G45.9 TRANSIENT CEREBRAL ISCHEMIA, UNSPECIFIED TYPE: ICD-10-CM

## 2024-08-09 PROCEDURE — 70551 MRI BRAIN STEM W/O DYE: CPT | Mod: TC

## 2024-08-09 PROCEDURE — 72141 MRI NECK SPINE W/O DYE: CPT | Mod: TC

## 2024-08-09 PROCEDURE — 70551 MRI BRAIN STEM W/O DYE: CPT | Mod: 26,,, | Performed by: RADIOLOGY

## 2024-08-09 PROCEDURE — 72141 MRI NECK SPINE W/O DYE: CPT | Mod: 26,,, | Performed by: RADIOLOGY

## 2024-08-23 ENCOUNTER — OFFICE VISIT (OUTPATIENT)
Dept: INTERNAL MEDICINE | Facility: CLINIC | Age: 49
End: 2024-08-23
Payer: OTHER GOVERNMENT

## 2024-08-23 ENCOUNTER — LAB VISIT (OUTPATIENT)
Dept: LAB | Facility: HOSPITAL | Age: 49
End: 2024-08-23
Attending: INTERNAL MEDICINE
Payer: OTHER GOVERNMENT

## 2024-08-23 VITALS
HEART RATE: 79 BPM | HEIGHT: 64 IN | SYSTOLIC BLOOD PRESSURE: 120 MMHG | WEIGHT: 244.25 LBS | BODY MASS INDEX: 41.7 KG/M2 | OXYGEN SATURATION: 97 % | DIASTOLIC BLOOD PRESSURE: 82 MMHG

## 2024-08-23 DIAGNOSIS — E89.0 POST-SURGICAL HYPOTHYROIDISM: ICD-10-CM

## 2024-08-23 DIAGNOSIS — Z12.31 ENCOUNTER FOR SCREENING MAMMOGRAM FOR MALIGNANT NEOPLASM OF BREAST: ICD-10-CM

## 2024-08-23 DIAGNOSIS — L50.9 HIVE: ICD-10-CM

## 2024-08-23 DIAGNOSIS — L71.9 ROSACEA: ICD-10-CM

## 2024-08-23 DIAGNOSIS — Z00.00 ROUTINE GENERAL MEDICAL EXAMINATION AT A HEALTH CARE FACILITY: ICD-10-CM

## 2024-08-23 DIAGNOSIS — G89.29 CHRONIC NECK PAIN: ICD-10-CM

## 2024-08-23 DIAGNOSIS — Z00.00 ROUTINE GENERAL MEDICAL EXAMINATION AT A HEALTH CARE FACILITY: Primary | ICD-10-CM

## 2024-08-23 DIAGNOSIS — M54.2 CHRONIC NECK PAIN: ICD-10-CM

## 2024-08-23 LAB
ALBUMIN SERPL BCP-MCNC: 3.4 G/DL (ref 3.5–5.2)
ALP SERPL-CCNC: 82 U/L (ref 55–135)
ALT SERPL W/O P-5'-P-CCNC: 13 U/L (ref 10–44)
ANION GAP SERPL CALC-SCNC: 11 MMOL/L (ref 8–16)
AST SERPL-CCNC: 17 U/L (ref 10–40)
BILIRUB SERPL-MCNC: 0.2 MG/DL (ref 0.1–1)
BUN SERPL-MCNC: 11 MG/DL (ref 6–20)
CALCIUM SERPL-MCNC: 9.2 MG/DL (ref 8.7–10.5)
CHLORIDE SERPL-SCNC: 105 MMOL/L (ref 95–110)
CO2 SERPL-SCNC: 25 MMOL/L (ref 23–29)
CREAT SERPL-MCNC: 0.8 MG/DL (ref 0.5–1.4)
EST. GFR  (NO RACE VARIABLE): >60 ML/MIN/1.73 M^2
ESTIMATED AVG GLUCOSE: 103 MG/DL (ref 68–131)
GLUCOSE SERPL-MCNC: 75 MG/DL (ref 70–110)
HBA1C MFR BLD: 5.2 % (ref 4–5.6)
POTASSIUM SERPL-SCNC: 3.5 MMOL/L (ref 3.5–5.1)
PROT SERPL-MCNC: 6.8 G/DL (ref 6–8.4)
SODIUM SERPL-SCNC: 141 MMOL/L (ref 136–145)
TSH SERPL DL<=0.005 MIU/L-ACNC: 2.25 UIU/ML (ref 0.4–4)

## 2024-08-23 PROCEDURE — 86038 ANTINUCLEAR ANTIBODIES: CPT | Performed by: INTERNAL MEDICINE

## 2024-08-23 PROCEDURE — 99396 PREV VISIT EST AGE 40-64: CPT | Mod: S$PBB,,, | Performed by: INTERNAL MEDICINE

## 2024-08-23 PROCEDURE — 99999 PR PBB SHADOW E&M-EST. PATIENT-LVL V: CPT | Mod: PBBFAC,,, | Performed by: INTERNAL MEDICINE

## 2024-08-23 PROCEDURE — 80053 COMPREHEN METABOLIC PANEL: CPT | Performed by: INTERNAL MEDICINE

## 2024-08-23 PROCEDURE — 99215 OFFICE O/P EST HI 40 MIN: CPT | Mod: PBBFAC | Performed by: INTERNAL MEDICINE

## 2024-08-23 PROCEDURE — 84443 ASSAY THYROID STIM HORMONE: CPT | Performed by: INTERNAL MEDICINE

## 2024-08-23 PROCEDURE — 83036 HEMOGLOBIN GLYCOSYLATED A1C: CPT | Performed by: INTERNAL MEDICINE

## 2024-08-23 RX ORDER — METRONIDAZOLE 7.5 MG/G
CREAM TOPICAL 2 TIMES DAILY
Qty: 45 G | Refills: 1 | Status: SHIPPED | OUTPATIENT
Start: 2024-08-23 | End: 2025-08-23

## 2024-08-23 NOTE — PROGRESS NOTES
Subjective:       Patient ID: Kellen Fraser is a 49 y.o. female.    Chief Complaint: Annual Exam    Here for annual exam    Still R sided neck pains, especially in back and paraspinal. When she turns hears clicking.    Finger numbness resolved.    Has red rash on bilat cheeks, with bumps. Has been a few yrs.      Review of Systems   Constitutional:  Negative for activity change and unexpected weight change.   HENT:  Negative for hearing loss, rhinorrhea and trouble swallowing.    Eyes:  Negative for discharge and visual disturbance.   Respiratory:  Negative for chest tightness and wheezing.    Cardiovascular:  Negative for chest pain and palpitations.   Gastrointestinal:  Negative for blood in stool, constipation, diarrhea and vomiting.   Endocrine: Negative for polydipsia and polyuria.   Genitourinary:  Negative for difficulty urinating, dysuria, hematuria and menstrual problem.   Musculoskeletal:  Positive for arthralgias and neck pain. Negative for joint swelling.   Skin:  Positive for rash.   Neurological:  Negative for weakness and headaches.   Psychiatric/Behavioral:  Negative for confusion and dysphoric mood.            Objective:      Physical Exam  Vitals reviewed.   Constitutional:       General: She is not in acute distress.     Appearance: Normal appearance. She is well-developed. She is obese. She is not ill-appearing, toxic-appearing or diaphoretic.   HENT:      Head: Normocephalic and atraumatic.   Eyes:      General: No scleral icterus.     Pupils: Pupils are equal, round, and reactive to light.   Neck:      Thyroid: No thyromegaly.   Cardiovascular:      Rate and Rhythm: Normal rate and regular rhythm.      Heart sounds: Normal heart sounds. No murmur heard.     No friction rub. No gallop.   Pulmonary:      Effort: Pulmonary effort is normal. No respiratory distress.      Breath sounds: Normal breath sounds. No wheezing or rales.   Abdominal:      General: Bowel sounds are normal. There is no  distension.      Palpations: Abdomen is soft. There is no mass.      Tenderness: There is no abdominal tenderness. There is no guarding or rebound.   Musculoskeletal:         General: No tenderness.      Cervical back: Normal range of motion.      Comments: Neck with painful range of motion, especially to the right, but no midline tenderness.   Lymphadenopathy:      Cervical: No cervical adenopathy.   Skin:     Findings: Rash present.      Comments: Rash on the cheeks and the nose with confluent redness, and some red papules.   Neurological:      General: No focal deficit present.      Mental Status: She is alert and oriented to person, place, and time.   Psychiatric:         Mood and Affect: Mood normal.         Speech: Speech normal.         Behavior: Behavior normal.         Assessment:       1. Routine general medical examination at a health care facility    2. Rosacea    3. Post-surgical hypothyroidism    4. Hive    5. Encounter for screening mammogram for malignant neoplasm of breast    6. Chronic neck pain        Plan:       Kellen was seen today for annual exam.    Diagnoses and all orders for this visit:    Routine general medical examination at a health care facility  -     Hemoglobin A1C; Future  -     Comprehensive Metabolic Panel; Future    Rosacea  -     metronidazole 0.75% (METROCREAM) 0.75 % Crea; Apply topically 2 (two) times daily.  -     GORDON; Future  I think her rash is rosacea but we will check a lupus test and she inquired if this could be from lupus.    Post-surgical hypothyroidism  -     TSH; Future    Hive  -     GORDON; Future    Encounter for screening mammogram for malignant neoplasm of breast  -     Mammo Digital Screening Bilat w/ Gregg; Future    Chronic neck pain  -     Ambulatory referral/consult to Physical/Occupational Therapy; Future        Health Maintenance         Date Due Completion Date    Colorectal Cancer Screening Never done ---    Mammogram 12/23/2022 12/23/2021    COVID-19  Vaccine (1 - 2023-24 season) Never done ---    Lipid Panel 09/02/2025 9/2/2020    Cervical Cancer Screening 11/03/2025 11/3/2020    Override on 12/1/2014: Done    Hemoglobin A1c (Diabetic Prevention Screening) 08/23/2027 8/23/2024    TETANUS VACCINE 02/07/2028 2/7/2018   She has Cologuard kit at home and will consider whether to use it.         Visit today included increased complexity associated with the care of the episodic problem  addressed and managing the longitudinal care of the patient due to the serious and/or complex managed problem(s) .    Follow up in about 1 year (around 8/23/2025).    No future appointments.

## 2024-08-26 LAB — ANA SER QL IF: NORMAL

## 2024-08-28 ENCOUNTER — PATIENT MESSAGE (OUTPATIENT)
Dept: INTERNAL MEDICINE | Facility: CLINIC | Age: 49
End: 2024-08-28
Payer: OTHER GOVERNMENT

## 2024-08-28 DIAGNOSIS — E89.0 POST-SURGICAL HYPOTHYROIDISM: Primary | ICD-10-CM

## 2024-08-28 RX ORDER — LEVOTHYROXINE SODIUM 200 UG/1
200 TABLET ORAL
Qty: 90 TABLET | Refills: 11 | Status: SHIPPED | OUTPATIENT
Start: 2024-08-28 | End: 2024-08-29 | Stop reason: SDUPTHER

## 2024-08-29 RX ORDER — LEVOTHYROXINE SODIUM 200 UG/1
200 TABLET ORAL
Qty: 90 TABLET | Refills: 11 | Status: SHIPPED | OUTPATIENT
Start: 2024-08-29

## 2024-11-21 ENCOUNTER — PATIENT MESSAGE (OUTPATIENT)
Dept: ADMINISTRATIVE | Facility: HOSPITAL | Age: 49
End: 2024-11-21
Payer: OTHER GOVERNMENT

## 2024-11-22 DIAGNOSIS — Z12.11 SCREENING FOR COLON CANCER: ICD-10-CM

## 2024-12-13 ENCOUNTER — HOSPITAL ENCOUNTER (OUTPATIENT)
Dept: RADIOLOGY | Facility: HOSPITAL | Age: 49
Discharge: HOME | End: 2024-12-13
Attending: INTERNAL MEDICINE
Payer: OTHER GOVERNMENT

## 2024-12-13 DIAGNOSIS — Z12.31 ENCOUNTER FOR SCREENING MAMMOGRAM FOR MALIGNANT NEOPLASM OF BREAST: ICD-10-CM

## 2024-12-13 PROCEDURE — 77063 BREAST TOMOSYNTHESIS BI: CPT | Mod: 26,,, | Performed by: RADIOLOGY

## 2024-12-13 PROCEDURE — 77067 SCR MAMMO BI INCL CAD: CPT | Mod: TC

## 2024-12-13 PROCEDURE — 77067 SCR MAMMO BI INCL CAD: CPT | Mod: 26,,, | Performed by: RADIOLOGY

## 2024-12-26 ENCOUNTER — PATIENT MESSAGE (OUTPATIENT)
Dept: INTERNAL MEDICINE | Facility: CLINIC | Age: 49
End: 2024-12-26
Payer: OTHER GOVERNMENT

## 2024-12-26 DIAGNOSIS — Z12.11 COLON CANCER SCREENING: Primary | ICD-10-CM

## 2025-06-17 ENCOUNTER — PATIENT MESSAGE (OUTPATIENT)
Dept: INTERNAL MEDICINE | Facility: CLINIC | Age: 50
End: 2025-06-17
Payer: OTHER GOVERNMENT

## 2025-06-17 NOTE — PROGRESS NOTES
INTERNAL MEDICINE CLINIC - SAME DAY APPOINTMENT  Progress Note    PRESENTING HISTORY     PCP: Herman Rivera MD    Chief Complaint/Reason for Visit:   No chief complaint on file.  History of Present Illness & ROS : Ms. Kellen Fraser is a 50 y.o. female.      Same day apt.   New to me.   Est'd with Dr. Rivera.   Works at [employer].   Reports that was treated for an 'ear infection' by another provider at [employer] [facility] [employer] (6/13/2025) last Tuesday, treated with and still on Doxycycline, but 'not improved'. The pain and muffling sensations were initially to left ear, and now to both ears now.... no fever, headaches or congestion. About 2 months ago went to an  and started on Singulair and a nasal spray, using but 'out of the blue, quick onset of discomfort'.   No sore throat.     She has had 2 events with her ear preceding the today's appt.     Review of Systems:  Eyes: denies visual changes at this time denies floaters   ENT: no nasal congestion or sore throat  Respiratory: no cough or shorness of breath  Cardiovascular: no chest pain or palpitations  Gastrointestinal: no nausea or vomiting, no abdominal pain or change in bowel habits  Genitourinary: no hematuria or dysuria; denies frequency  Hematologic/Lymphatic: no easy bruising or lymphadenopathy  Musculoskeletal: no arthralgias or myalgias  Neurological: no seizures or tremors  Endocrine: no heat or cold intolerance        PAST HISTORY:     Past Medical History:   Diagnosis Date    Allergy     BRCA negative 11/2014    Cervical disc disease     C5-C6, impacting thecal sac    Gallbladder sludge     Hypothyroidism     Thyroid cancer     Uterine fibroid        Past Surgical History:   Procedure Laterality Date    BREAST BIOPSY Right     exc bx    CHOLECYSTECTOMY  2012    ENDOSCOPIC PLANTAR FASCIOTOMY Right 04/19/2023    Procedure: FASCIOTOMY, PLANTAR, ENDOSCOPIC;  Surgeon: Clemente Young DPM;  Location: [facility];  Service: Podiatry;  Laterality: Right;  EPF right foot - need  endoscopic set up from     EPIDURAL STEROID INJECTION N/A 09/10/2019    Procedure: Injection, Steroid, Epidural CERVICAL/THORACIC C7-T1 IL JONATHAN;  Surgeon: Rachele Cody MD;  [facility];  Service: Pain Management;  Laterality: N/A;  NEEDS CONSENT    FOOT SURGERY Right     THYROIDECTOMY  2004       Family History   Problem Relation Name Age of Onset    Stroke Mother          massive stroke    Kidney disease Father [place]     Heart disease Father [place]         CHF    Hypertension Father [place]     Diabetes Father [place]     Thyroid cancer Sister Ravin     Hodgkin's lymphoma Sister Ravin     Cancer Sister Ravin         Hodgkin, thyroid, Breast, melanomas    Breast cancer Sister Ravin     Breast cancer Sister      Cancer Maternal Grandmother Gert         Breast    Diabetes Maternal Grandmother Gert     Breast cancer Maternal Grandmother Gert 70    Heart disease Maternal Grandfather Juvenal     Cancer Paternal Grandfather Lute         Lymphoma    Leukemia Brother Oli     Cancer Brother Oli         Acute leukemia    Hypertension Brother      Diabetes Brother      Ovarian cancer Neg Hx      Melanoma Neg Hx         Social History     Socioeconomic History    Marital status:    Tobacco Use    Smoking status: Never    Smokeless tobacco: Never   Substance and Sexual Activity    Alcohol use: Yes     Alcohol/week: 2.0 standard drinks of alcohol     Types: 2 Cans of beer per week     Comment: socially    Drug use: No    Sexual activity: Yes     Partners: Male     Birth control/protection: Partner-Vasectomy   Social History Narrative    Works at [employer], , lives with , 4 kids grown 28, 23, 22, 19. Has grandbaby. Not much exercise     Social Drivers of Health     Financial Resource Strain: Low Risk  (8/17/2022)    Overall Financial Resource Strain (CARDIA)     Difficulty of Paying Living Expenses: Not hard at all   Food Insecurity: No Food Insecurity (8/17/2022)    Hunger Vital Sign      Worried About Running Out of Food in the Last Year: Never true     Ran Out of Food in the Last Year: Never true   Transportation Needs: No Transportation Needs (8/17/2022)    PRAPARE - Transportation     Lack of Transportation (Medical): No     Lack of Transportation (Non-Medical): No   Physical Activity: Sufficiently Active (8/17/2022)    Exercise Vital Sign     Days of Exercise per Week: 5 days     Minutes of Exercise per Session: 30 min   Stress: No Stress Concern Present (8/17/2022)    [employer] - Occupational Stress Questionnaire     Feeling of Stress : Not at all   Housing Stability: Low Risk  (8/17/2022)    Housing Stability Vital Sign     Unable to Pay for Housing in the Last Year: No     Number of Places Lived in the Last Year: 1     Unstable Housing in the Last Year: No       MEDICATIONS & ALLERGIES:     Medications Ordered Prior to Encounter[1]     Review of patient's allergies indicates:   Allergen Reactions    Morphine Shortness Of Breath    Shellfish containing products Hives and Itching    Betadine surgi-prep Hives    Dilaudid [hydromorphone (bulk)] Hives     Ok to take percocet    Dilaudid [hydromorphone] Hives    Erythromycin Hives    Influenza vac typ a,b surf ant Hives    Influenza virus vaccines Hives    Meperidine Hives and Itching     Other reaction(s): Rash, Urticaria, Rash, Urticaria    Penicillins Hives     Other reaction(s): Other: hives    Reglan [metoclopramide] Nausea And Vomiting    Toradol [ketorolac] Nausea And Vomiting    Oyster shell Diarrhea and Nausea And Vomiting    Sulfamethoxazole-trimethoprim Nausea And Vomiting     Other reaction(s): Unknown       Medications Reconciliation:   I have reconciled the patient's home medications with the patient/family. I have updated all changes.  Refer to After-Visit Medication List.    OBJECTIVE:     Vital Signs:  There were no vitals filed for this visit.  Wt Readings from Last 3 Encounters:   08/23/24 1337  110.8 kg (244 lb 4.3 oz)   07/24/24 1308 106 kg (233 lb 11 oz)   05/22/24 1359 106.8 kg (235 lb 7.2 oz)     There is no height or weight on file to calculate BMI.   Wt Readings from Last 3 Encounters:   06/18/25 112 kg (246 lb 14.6 oz)   08/23/24 110.8 kg (244 lb 4.3 oz)   07/24/24 106 kg (233 lb 11 oz)     Temp Readings from Last 3 Encounters:   04/19/23 97.9 °F (36.6 °C) (Oral)   04/12/23 98.1 °F (36.7 °C) (Skin)   01/06/22 99 °F (37.2 °C) (Oral)     BP Readings from Last 3 Encounters:   06/18/25 (!) 110/90   08/23/24 120/82   07/24/24 119/82     Pulse Readings from Last 3 Encounters:   06/18/25 73   08/23/24 79   07/24/24 70       Physical Exam:  General: Well developed, well nourished. No distress.  HEENT: Head is normocephalic, atraumatic   Right Ear: cerumen impacted  Left Ear: + moderate - gross erythema and serous fluid with a bulge   Eyes: Clear conjunctiva.  Neck: Supple, symmetrical neck; trachea midline.  Extremities: No LE edema. Pulses 2+ and symmetric.   Skin: Skin color, texture, turgor normal. No rashes.  Musculoskeletal: Normal gait.   Lymph Nodes: No cervical or supraclavicular adenopathy.  Neurologic: Normal strength and tone. No focal numbness or weakness.   Psychiatric: Not depressed.'    Laboratory  Lab Results   Component Value Date    WBC 10.16 07/24/2024    HGB 11.5 (L) 07/24/2024    HCT 38.1 07/24/2024     07/24/2024    CHOL 180 09/02/2020    TRIG 138 09/02/2020    HDL 51 09/02/2020    ALT 13 08/23/2024    AST 17 08/23/2024     08/23/2024    K 3.5 08/23/2024     08/23/2024    CREATININE 0.8 08/23/2024    BUN 11 08/23/2024    CO2 25 08/23/2024    TSH 2.251 08/23/2024    HGBA1C 5.2 08/23/2024       ASSESSMENT & PLAN:     Same day apt.     Acute bacterial otitis media, left    Hearing loss, unspecified hearing loss type, unspecified laterality    Otalgia, unspecified laterality    Impacted cerumen of right ear  -     Ambulatory referral/consult to ENT; Future; Expected  date: 06/25/2025  -     Comprehensive audiogram; Future  -     Ambulatory referral/consult to Audiology; Future; Expected date: 06/25/2025  -     ciprofloxacin-dexAMETHasone 0.3-0.1% (CIPRODEX) 0.3-0.1 % DrpS; Place 4 drops into the left ear 2 (two) times daily.  Dispense: 7.5 mL; Refill: 0  ` complete the current antibiotic Doxycycline) as prescribed.       Future Appointments   Date Time Provider Department Center   8/8/2025  2:00 PM Domitila Hayden AU.D [facility] AUDIO [facility]   8/8/2025  2:30 PM Rupesh Le MD [facility] ENT [facility]        Medication List            Accurate as of June 18, 2025  8:38 AM. If you have any questions, ask your nurse or doctor.                START taking these medications      ciprofloxacin-dexAMETHasone 0.3-0.1% 0.3-0.1 % Drps  Commonly known as: CIPRODEX  Place 4 drops into the left ear 2 (two) times daily.  Started by: BATSHEVA Joshi            CONTINUE taking these medications      ALBUTEROL INHL     doxycycline 100 MG tablet  Commonly known as: VIBRA-TABS     ibuprofen 800 MG tablet  Commonly known as: ADVIL,MOTRIN  Take 1 tablet (800 mg total) by mouth 3 (three) times daily as needed for Pain.     levothyroxine 200 MCG tablet  Commonly known as: SYNTHROID  Take 1 tablet (200 mcg total) by mouth before breakfast.     methocarbamoL 500 MG Tab  Commonly known as: Robaxin  Take 1 tablet (500 mg total) by mouth 3 (three) times daily as needed (neck pain and headaches).     metronidazole 0.75% 0.75 % Crea  Commonly known as: METROCREAM  Apply topically 2 (two) times daily.     montelukast 10 mg tablet  Commonly known as: SINGULAIR               Where to Get Your Medications        These medications were sent to [id number] - [place] [address] [facility] [address] [address]      Phone: [phone]   ciprofloxacin-dexAMETHasone 0.3-0.1% 0.3-0.1 % Drps       Signing Physician:  BATSHEVA Joshi          [1]   Current Outpatient Medications on File Prior to Visit   Medication Sig Dispense Refill    ALBUTEROL INHL       ibuprofen (ADVIL,MOTRIN) 800 MG tablet Take 1 tablet (800 mg total) by mouth 3 (three) times daily as needed for Pain. 30 tablet 0    levothyroxine (SYNTHROID) 200 MCG tablet Take 1 tablet (200 mcg total) by mouth before breakfast. 90 tablet 11    methocarbamoL (ROBAXIN) 500 MG Tab Take 1 tablet (500 mg total) by mouth 3 (three) times daily as needed (neck pain and headaches). 120 tablet 2    metronidazole 0.75% (METROCREAM) 0.75 % Crea Apply topically 2 (two) times daily. 45 g 1     Current Facility-Administered Medications on File Prior to Visit   Medication Dose Route Frequency Provider Last Rate Last Admin    0.9%  NaCl infusion   Intravenous 1 time in Clinic/HOD Alireza Smith MD        aluminum & magnesium hydroxide-simethicone 400-400-40 mg/5 mL suspension 30 mL  30 mL Oral Q6H PRN Aileen Vargas, NP   30 mL at 01/07/19 1049    doxycycline (VIBRAMYCIN) 100 mg in dextrose 5 % (D5W) 250 mL IVPB  100 mg Intravenous Q12H Clemente Young DPM        sodium chloride 0.9% flush 3 mL  3 mL Intravenous Q6H PRN Clemente Young DPM

## 2025-06-18 ENCOUNTER — OFFICE VISIT (OUTPATIENT)
Dept: INTERNAL MEDICINE | Facility: CLINIC | Age: 50
End: 2025-06-18
Payer: OTHER GOVERNMENT

## 2025-06-18 VITALS
OXYGEN SATURATION: 99 % | WEIGHT: 246.94 LBS | SYSTOLIC BLOOD PRESSURE: 110 MMHG | BODY MASS INDEX: 42.16 KG/M2 | DIASTOLIC BLOOD PRESSURE: 90 MMHG | HEART RATE: 73 BPM | HEIGHT: 64 IN

## 2025-06-18 DIAGNOSIS — H61.21 IMPACTED CERUMEN OF RIGHT EAR: ICD-10-CM

## 2025-06-18 DIAGNOSIS — H66.92 ACUTE BACTERIAL OTITIS MEDIA, LEFT: Primary | ICD-10-CM

## 2025-06-18 DIAGNOSIS — H92.09 OTALGIA, UNSPECIFIED LATERALITY: ICD-10-CM

## 2025-06-18 DIAGNOSIS — H91.90 HEARING LOSS, UNSPECIFIED HEARING LOSS TYPE, UNSPECIFIED LATERALITY: ICD-10-CM

## 2025-06-18 PROCEDURE — 99999 PR PBB SHADOW E&M-EST. PATIENT-LVL V: CPT | Mod: PBBFAC,,, | Performed by: NURSE PRACTITIONER

## 2025-06-18 PROCEDURE — 99214 OFFICE O/P EST MOD 30 MIN: CPT | Mod: S$PBB,,, | Performed by: NURSE PRACTITIONER

## 2025-06-18 PROCEDURE — 99215 OFFICE O/P EST HI 40 MIN: CPT | Mod: PBBFAC | Performed by: NURSE PRACTITIONER

## 2025-06-18 RX ORDER — CIPROFLOXACIN AND DEXAMETHASONE 3; 1 MG/ML; MG/ML
4 SUSPENSION/ DROPS AURICULAR (OTIC) 2 TIMES DAILY
Qty: 7.5 ML | Refills: 0 | Status: SHIPPED | OUTPATIENT
Start: 2025-06-18

## 2025-06-18 RX ORDER — MONTELUKAST SODIUM 10 MG/1
10 TABLET ORAL DAILY
COMMUNITY
Start: 2025-04-07

## 2025-06-18 RX ORDER — AZELASTINE 1 MG/ML
1 SPRAY, METERED NASAL 2 TIMES DAILY
Qty: 30 ML | Refills: 0 | Status: CANCELLED | OUTPATIENT
Start: 2025-06-18 | End: 2026-06-18

## 2025-06-18 RX ORDER — FLUTICASONE PROPIONATE 50 MCG
1 SPRAY, SUSPENSION (ML) NASAL DAILY
Qty: 16 G | Refills: 0 | Status: CANCELLED | OUTPATIENT
Start: 2025-06-18

## 2025-06-18 RX ORDER — DOXYCYCLINE HYCLATE 100 MG
100 TABLET ORAL 2 TIMES DAILY
COMMUNITY
Start: 2025-06-13

## 2025-06-27 ENCOUNTER — PATIENT MESSAGE (OUTPATIENT)
Dept: INTERNAL MEDICINE | Facility: CLINIC | Age: 50
End: 2025-06-27
Payer: OTHER GOVERNMENT

## 2025-06-27 NOTE — TELEPHONE ENCOUNTER
" LOV with Anitha Caba, BATSHEVA , 6/18/2025 - left acute bacterial otitis media  Referrals placed to ENT and audiology. Ciprodex prescribed.  Pt reports that she has completed oral antibiotics and has used ear drops/sudafed/nasal spray/mucinex x 7 days. Pt reports no changes or improvement in ear condition and that drops puddle in her ear and nasal spray only goes through a portion of her nose.   Pt reports cyst in sinus from MRI.   MRI was performed 8/9/24:  "The visualized paranasal sinuses and mastoid air cells are clear other than for small retention cyst left maxillary sinus.. "    Pt asking if there is anything else she can try.   "

## 2025-08-08 ENCOUNTER — CLINICAL SUPPORT (OUTPATIENT)
Dept: AUDIOLOGY | Facility: CLINIC | Age: 50
End: 2025-08-08
Payer: OTHER GOVERNMENT

## 2025-08-08 ENCOUNTER — OFFICE VISIT (OUTPATIENT)
Dept: OTOLARYNGOLOGY | Facility: CLINIC | Age: 50
End: 2025-08-08
Payer: OTHER GOVERNMENT

## 2025-08-08 DIAGNOSIS — H91.90 HEARING LOSS, UNSPECIFIED HEARING LOSS TYPE, UNSPECIFIED LATERALITY: ICD-10-CM

## 2025-08-08 DIAGNOSIS — H61.21 IMPACTED CERUMEN OF RIGHT EAR: ICD-10-CM

## 2025-08-08 DIAGNOSIS — H73.012 BULLOUS MYRINGITIS OF LEFT EAR: Primary | ICD-10-CM

## 2025-08-08 DIAGNOSIS — H90.3 SENSORINEURAL HEARING LOSS (SNHL) OF BOTH EARS: Primary | ICD-10-CM

## 2025-08-08 DIAGNOSIS — H69.90 DYSFUNCTION OF EUSTACHIAN TUBE, UNSPECIFIED LATERALITY: ICD-10-CM

## 2025-08-08 PROCEDURE — 99999 PR PBB SHADOW E&M-EST. PATIENT-LVL II: CPT | Mod: PBBFAC,,,

## 2025-08-08 PROCEDURE — 99212 OFFICE O/P EST SF 10 MIN: CPT | Mod: PBBFAC

## 2025-08-08 PROCEDURE — 99212 OFFICE O/P EST SF 10 MIN: CPT | Mod: PBBFAC,27 | Performed by: OTOLARYNGOLOGY

## 2025-08-08 PROCEDURE — 99999 PR PBB SHADOW E&M-EST. PATIENT-LVL II: CPT | Mod: PBBFAC,,, | Performed by: OTOLARYNGOLOGY

## 2025-08-08 RX ORDER — FLUTICASONE PROPIONATE 50 MCG
2 SPRAY, SUSPENSION (ML) NASAL DAILY
Qty: 16 G | Refills: 2 | Status: SHIPPED | OUTPATIENT
Start: 2025-08-08 | End: 2025-11-06

## 2025-08-08 NOTE — PROGRESS NOTES
Ear, Nose, & Throat  Otolaryngology - Head & Neck Surgery    Summary of Visit:  Kellen Fraser was referred to me by Anitha Caba in consultation for No chief complaint on file.      Subjective:     Chief Complaint: No chief complaint on file.      Kellen Fraser is a 50 y.o. female who was referred to me by Anitha Caba in consultation for bilateral intermittent ear pain for 4-5 months. In April, she reports sudden, severe R ear aching for which she went to urgent care where she was told she had fluid in both ears. She was treated with singulair and astelin with mild relief. In June, had sudden L fullness, muffled hearing and [facility] where she was prescribed doxycycline with minimal relief. Today she reports continued L facial and ear pressure, intermittent R pressure, muffled hearing, and frequent popping. No ear infection, surgery, previous hearing difficulty, loud noise exposure.   Past Medical History  Active Ambulatory Problems     Diagnosis Date Noted    Post-surgical hypothyroidism 06/23/2015    History of thyroid cancer 06/23/2015    Right thigh pain 11/20/2017    Breast mass 02/07/2018    Chronic pain 09/10/2019    Family history of breast cancer -- sister 09/09/2020    Decreased range of motion of lumbar spine 07/29/2021    Decreased strength of trunk and back 07/29/2021    Poor posture 07/29/2021    Ankle weakness 08/23/2022    Decreased range of motion of right ankle 08/23/2022    Weakness of foot, right 08/23/2022    Impaired functional mobility, balance, gait, and endurance 08/23/2022     Resolved Ambulatory Problems     Diagnosis Date Noted    Acute pansinusitis 09/11/2018     Past Medical History:   Diagnosis Date    Allergy     BRCA negative 11/2014    Cervical disc disease     Gallbladder sludge     Hypothyroidism     Thyroid cancer     Uterine fibroid        Past Surgical History  She has a past surgical history that includes Thyroidectomy (2004);  Cholecystectomy (2012); Foot surgery (Right); Breast biopsy (Right); Epidural steroid injection (N/A, 09/10/2019); and Endoscopic plantar fasciotomy (Right, 04/19/2023).    Past Surgical History:   Procedure Laterality Date    BREAST BIOPSY Right     exc bx    CHOLECYSTECTOMY  2012    ENDOSCOPIC PLANTAR FASCIOTOMY Right 04/19/2023    Procedure: FASCIOTOMY, PLANTAR, ENDOSCOPIC;  Surgeon: Clemente Young DPM;  Location: [facility] OR;  Service: Podiatry;  Laterality: Right;  EPF right foot - need endoscopic set up from     EPIDURAL STEROID INJECTION N/A 09/10/2019    Procedure: Injection, Steroid, Epidural CERVICAL/THORACIC C7-T1 IL JONATHAN;  Surgeon: Rachele Cody MD;  Location: [facility];  Service: Pain Management;  Laterality: N/A;  NEEDS CONSENT    FOOT SURGERY Right     THYROIDECTOMY  2004        Family History  Her family history includes Breast cancer in her sister and sister; Breast cancer (age of onset: 70) in her maternal grandmother; Cancer in her brother, maternal grandmother, paternal grandfather, and sister; Diabetes in her brother, father, and maternal grandmother; Heart disease in her father and maternal grandfather; Hodgkin's lymphoma in her sister; Hypertension in her brother and father; Kidney disease in her father; Leukemia in her brother; Stroke in her mother; Thyroid cancer in her sister.    Social History  She reports that she has never smoked. She has never used smokeless tobacco. She reports current alcohol use of about 2.0 standard drinks of alcohol per week. She reports that she does not use drugs.    Allergies  She is allergic to morphine; shellfish containing products; betadine surgi-prep; dilaudid [hydromorphone (bulk)]; dilaudid [hydromorphone]; erythromycin; influenza vac typ a,b surf ant; influenza virus vaccines; meperidine; penicillins; reglan [metoclopramide]; toradol [ketorolac]; oyster shell; and sulfamethoxazole-trimethoprim.    Medications  She has a current medication list  which includes the following prescription(s): albuterol, ciprofloxacin-dexamethasone 0.3-0.1%, doxycycline, ibuprofen, levothyroxine, methocarbamol, metronidazole 0.75%, and montelukast, and the following Facility-Administered Medications: 0.9% nacl, aluminum & magnesium hydroxide-simethicone, doxycycline (VIBRAMYCIN) 100 mg in dextrose 5 % (D5W) 250 mL IVPB, and sodium chloride 0.9%.    ROS:  Pertinent positive and negative review of systems as noted in HPI.     Objective:     There were no vitals taken for this visit.   General Appearance:   Awake, Alert and Oriented. NAD. Appropriate affect and appearance      Neuro:   Spontaneous eye opening, appropriate verbal responses, follows commands  Pupils equal, round & brisk. EOMI, no proptosis  Face is symmetric, HB I, non-edematous bilaterally  Vision grossly intact, Hearing grossly intact     Head and Face:   skin is intact with no lesions noted.  Parotid and submandibular glands are symmetric and non-tender.      Ears:  Periauricular regions non-erythematous, non-fluctuanct non-tender  Pinna normal bilaterally, no skin lesions  EACs patent and without drainage bilaterally   R Tympanic membrane normal in appearance, L TM with evidence of healing bullous myringitis.  No middle ear effusion noted bilaterally.    Nose:   External nose is symmetric, no skin lesions  Septum midline, No inferior turbinate hypertrophy, No polyps or rhinorrhea     OC/OP:  Tongue midline on extension, non-edematous, soft  No labial, buccal, oral tongue or floor of mouth lesions  Soft palate symmetric, midline and without lesions or masses, tonsils symmetric  No masses or lesions of the visualized oropharynx     Neck:  Neck is symmetric, non-edematous, non-erythematous  Trachea is midline and easily palpable,  No palpable adenopathy or masses in levels I-VI  No thyroid nodules or masses, non-tender      Respiratory:  Normal work of breathing, no accessory muscle use, no stridor      Voice:  Normal vocal quality, volume and articulation    Data Review:   LABS    IMAGING        AUDIO      Procedures:       Assessment:     1. Bullous myringitis of left ear    2. Hearing loss, unspecified hearing loss type, unspecified laterality    3. Impacted cerumen of right ear    4. Dysfunction of Eustachian tube, unspecified laterality        Plan:     I had a long discussion with the patient regarding her condition and the further workup and management options.  Sequela bullous myringitis is noted in the left ear Recommend flonase for ETD symptoms and to allow the L TM to continue to heal. Patient in agreement. Will return to clinic as needed.    No orders of the defined types were placed in this encounter.         Problem List Items Addressed This Visit    None  Visit Diagnoses         Hearing loss, unspecified hearing loss type, unspecified laterality          Acute bacterial otitis media, left          Impacted cerumen of right ear

## 2025-08-08 NOTE — PROGRESS NOTES
Kellen Fraser, a 50 y.o. female, was seen today in the clinic for an audiologic evaluation. Ms. Fraser reported decreased hearing in both ears and intermittent tinnitus. She reported aural fullness and occasional otalgia. Ms. Fraser also reported a recent episode of dizziness that occurred while driving.    Tympanometry revealed Type A tympanogram in the right ear and Type A tympanogram in the left ear. Audiogram results revealed mild sensorineural hearing loss in the right ear and borderline normal hearing sensitivity to mild sensorineural hearing loss in the left ear.  Speech reception thresholds were noted at 15 dBHL in the right ear and 15 dBHL in the left ear.  Speech discrimination scores were 100% in the right ear and 100% in the left ear.    Recommendations:  Otologic evaluation  Annual audiogram or sooner if change is perceived  Hearing protection in noise

## (undated) DEVICE — DRAPE U SPLIT SHEET 54X76IN

## (undated) DEVICE — DRESSING N ADH OIL EMUL 3X3

## (undated) DEVICE — BNDG COFLEX FOAM LF2 ST 6X5YD

## (undated) DEVICE — DRAPE EXTREMITY ORTHOMAX

## (undated) DEVICE — BAG DRAIN ANTI REFLUX 2000ML

## (undated) DEVICE — SYR 10CC LUER LOCK

## (undated) DEVICE — SPONGE COTTON TRAY 4X4IN

## (undated) DEVICE — SUT 4-0 VICRYL / P-3

## (undated) DEVICE — TOURNIQUET SB QC DP 18X4IN

## (undated) DEVICE — TOWEL OR DISP STRL BLUE 4/PK

## (undated) DEVICE — SKINMARKER W/RULER DEVON

## (undated) DEVICE — NDL HYPO REG 25G X 1 1/2

## (undated) DEVICE — GLOVE BIOGEL SKINSENSE PI 7.5

## (undated) DEVICE — SET ENDOTRAC BLADE SYS DISP

## (undated) DEVICE — DRAPE THREE-QTR REINF 53X77IN

## (undated) DEVICE — PAD CAST SPECIALIST STRL 4

## (undated) DEVICE — CONTAINER SPEC OR STRL 4.5OZ

## (undated) DEVICE — SEE MEDLINE ITEM 157216

## (undated) DEVICE — BANDAGE MATRIX HK LOOP 4IN 5YD

## (undated) DEVICE — BLADE SURG STAINLESS STEEL #15

## (undated) DEVICE — Device

## (undated) DEVICE — APPLICATOR CHLORAPREP ORN 26ML

## (undated) DEVICE — VIDEO RENTAL SERVICE

## (undated) DEVICE — DRESSING LEUKOPLAST FLEX 1X3IN

## (undated) DEVICE — SUT ETHILON 4-0 PS2 18 BLK

## (undated) DEVICE — NDL HYPO STD REG BVL 18GX1.5IN

## (undated) DEVICE — BANDAGE ROLL COTTN 4.5INX4.1YD

## (undated) DEVICE — BANDAGE ESMARK ELASTIC ST 4X9

## (undated) DEVICE — SET BASIN 48X48IN 6000ML RING

## (undated) DEVICE — UNDERGLOVES BIOGEL PI SIZE 7.5

## (undated) DEVICE — WALKER BOOT SHORT UNIV MED

## (undated) DEVICE — ELECTRODE REM PLYHSV RETURN 9